# Patient Record
Sex: MALE | Race: OTHER | HISPANIC OR LATINO
[De-identification: names, ages, dates, MRNs, and addresses within clinical notes are randomized per-mention and may not be internally consistent; named-entity substitution may affect disease eponyms.]

---

## 2023-11-02 ENCOUNTER — TRANSCRIPTION ENCOUNTER (OUTPATIENT)
Age: 63
End: 2023-11-02

## 2023-11-02 ENCOUNTER — RESULT REVIEW (OUTPATIENT)
Age: 63
End: 2023-11-02

## 2023-11-13 PROBLEM — Z00.00 ENCOUNTER FOR PREVENTIVE HEALTH EXAMINATION: Status: ACTIVE | Noted: 2023-11-13

## 2023-11-30 ENCOUNTER — APPOINTMENT (OUTPATIENT)
Dept: HEPATOLOGY | Facility: CLINIC | Age: 63
End: 2023-11-30

## 2023-11-30 VITALS
RESPIRATION RATE: 16 BRPM | DIASTOLIC BLOOD PRESSURE: 85 MMHG | BODY MASS INDEX: 30.12 KG/M2 | HEART RATE: 88 BPM | WEIGHT: 170 LBS | SYSTOLIC BLOOD PRESSURE: 133 MMHG | OXYGEN SATURATION: 97 % | TEMPERATURE: 98.4 F | HEIGHT: 63 IN

## 2023-12-19 ENCOUNTER — APPOINTMENT (OUTPATIENT)
Dept: HEPATOLOGY | Facility: CLINIC | Age: 63
End: 2023-12-19
Payer: SELF-PAY

## 2023-12-19 VITALS
DIASTOLIC BLOOD PRESSURE: 84 MMHG | BODY MASS INDEX: 30.65 KG/M2 | SYSTOLIC BLOOD PRESSURE: 133 MMHG | HEIGHT: 63 IN | WEIGHT: 173 LBS | HEART RATE: 87 BPM

## 2023-12-19 PROCEDURE — 99214 OFFICE O/P EST MOD 30 MIN: CPT

## 2023-12-20 NOTE — HISTORY OF PRESENT ILLNESS
[de-identified] : 63 yr David M hx HTN and newly discovered Cirrhosis and portal htn with ascites during at hosp [facility] for abdl distention, underwent LVP with 8300 cc removed on 23. Followed at [facility] with hx of alcohol use, no previous hx ascites here with 2 weeks of abd distention and sob.  Immigrant, no insurance  had repeat LVP 8 days ago ([facility])  MELD 10 [ABO A+]  Accompanied by Son Dandy  MEDICATIONS lisinopril 5mg lasix 40 mg  SOCIAL [place], [place]rs in [place], not an [place] , 3 children TOBACCO - never ETOH - drank 14-16 beer on weekends for 7 yrs, reduced 3-4 beers weekends a year ago, stopeed drinking 3 mos ago DRUGS/HERBALS - denies  SURG no  FAMILY HX Father, liver issues, emdprses father daniella kaiser,  sister  pancreatic cancer at age39  STUDIES CT of the Abdomen and Pelvis  FINDINGS: LOWER CHEST: Cardiomegaly. Mild elevation right hemidiaphragm. Coronary artery calcifications. Bibasilar dependent atelectasis right greater than left. Distal esophageal varices and small hiatal hernia.  LIVER: Micronodular contour. Recanalized umbilical vein. Distal esophageal and left gastric varices. BILE DUCTS: Normal caliber. GALLBLADDER: Within normal limits. SPLEEN: Splenomegaly 14.2 cm AP diameter. Accessory spleen. PANCREAS: Within normal limits. ADRENALS: Within normal limits. KIDNEYS/URETERS: Within normal limits. BLADDER: Within normal limits. REPRODUCTIVE ORGANS: Normal size prostate. BOWEL: No bowel obstruction. Appendix is normal. Wall thickening from the cecum to the distal transverse colon. Colonic diverticulosis. Small hiatal hernia. PERITONEUM: Large amount of abdominal and pelvic ascites. Mesenteric and omental edema. VESSELS: Left gastric and distal esophageal varices. Recanalized umbilical vein. RETROPERITONEUM/LYMPH NODES: No lymphadenopathy. ABDOMINAL WALL: Small umbilical hernia containing fat and ascites. Small right and large left inguinal hernias containing fat and ascites. Subcutaneous edema. BONES: Straightening of normal lordosis. Moderate degenerative changes of the spine. [FreeTextEntry1] : VS stable  Mild muscle wasting  Large ascites  Clear lungs  Regular heart sounds  No edema   Awake and alert

## 2023-12-20 NOTE — ASSESSMENT
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  Patient was advised to go to emergency room if paracentesis cannot be arranged He is a scheduled for EGD and colonoscopy by his GI He is social and immigration status is a major barrier for transplant evaluation

## 2024-01-02 ENCOUNTER — APPOINTMENT (OUTPATIENT)
Dept: HEPATOLOGY | Facility: CLINIC | Age: 64
End: 2024-01-02
Payer: SELF-PAY

## 2024-01-02 VITALS
BODY MASS INDEX: 27.46 KG/M2 | HEIGHT: 63 IN | SYSTOLIC BLOOD PRESSURE: 133 MMHG | WEIGHT: 155 LBS | DIASTOLIC BLOOD PRESSURE: 88 MMHG | HEART RATE: 79 BPM

## 2024-01-02 PROCEDURE — 99213 OFFICE O/P EST LOW 20 MIN: CPT

## 2024-01-02 NOTE — PHYSICAL EXAM
[Alert] : alert [Normal Voice/Communication] : normal voice/communication [No Acute Distress] : no acute distress [Sclera] : the sclera and conjunctiva were normal [Normal Appearance] : the appearance of the neck was normal [No Neck Mass] : no neck mass was observed [No Respiratory Distress] : no respiratory distress [No Acc Muscle Use] : no accessory muscle use [Respiration, Rhythm And Depth] : normal respiratory rhythm and effort [Heart Rate And Rhythm] : heart rate was normal and rhythm regular [Normal S1, S2] : normal S1 and S2 [Abdomen Tenderness] : non-tender [No Masses] : no abdominal mass palpated [Abdomen Soft] : soft [No Joint Swelling] : no joint swelling seen [Normal Color / Pigmentation] : normal skin color and pigmentation [] : no rash [Oriented To Time, Place, And Person] : oriented to person, place, and time [de-identified] : Some temporal wasting [de-identified] : Mild abdominal distension  [de-identified] : No LE edema [de-identified] : NO asterixis

## 2024-01-02 NOTE — ASSESSMENT
[FreeTextEntry1] : Mr. Dickson is a 62y/o Singaporean gentleman with a medical history of sHTN, newly diagnosed liver cirrhosis (Nov, 2023) presumed due to alcohol use d/o who is here for follow up. His liver cirrhosis is complicated by ascites that has required LVP frequently in the past month - most recently on Friday 12/29.   Here with his daughter for follow up.  1. Ascites: Will continue lasix and spironolactone. Will check labs today and if electrolytes allow, will increase lasix and spironolactone. I emphasized the importance of low Na diet as the diuretics will not be as effective if his NA intake remains high. He verbalized understanding although appears to struggle with the concept of cutting down his salt intake. His daughter promises to help. WIll plan for LVP if needed prior to EGD next week. If recalcitrant to medical therapies, will consider TIPS +/- OLT eval. Now has emergency medicaid.  2. History of EV with no EVB per daughter. S/p banding and planned for repeat EGD 1/2/24. May benefit from NSBB. Will obtain EGD report.  3. No signs of HE.  4. Denies cravings. Advised that if it ever becomes an issue, he should please let us know as we have medications that can help. PETH today.  RTC in 4 weeks

## 2024-01-02 NOTE — HISTORY OF PRESENT ILLNESS
[FreeTextEntry1] : Mr. Dickson is a 64y/o [place] gentleman with a medical history of sHTN, newly diagnosed liver cirrhosis (Nov, 2023) presumed due to alcohol use d/o who is here for follow up. His liver cirrhosis is complicated by ascites that has required LVP frequently in the past month - most recently on Friday 12/29.   He is accompanied by his daughter at today's visit. He reports feeling well but is concerned about his frequent ascites re-accumulation.   He is compliant with his meds - Lasix 40mg, Spironolactone 50mg. But is not compliant with a low Na diet. I stressed the importance of ascites management with 2gm Na restricted diet. He offers no complaints. Had EGD few weeks ago and varices were found which were apparently banded. He is planned for repeat EGD on 1/8/24 for variceal follow up. WIll obtain report. Had an episode of dark stools about 3 days ago but it self resolved. Has a good appetite when his abdomen is not distended. Energy and sleep are ok.  Lives with his son. Not working at the moment.   CURRENT MEDICATIONS Lasix 40 mg Spironolactone 50mg  Omeprazole 40mg   STUDIES CT of the Abdomen and Pelvis  FINDINGS: LOWER CHEST: Cardiomegaly. Mild elevation right hemidiaphragm. Coronary artery calcifications. Bibasilar dependent atelectasis right greater than left. Distal esophageal varices and small hiatal hernia.  LIVER: Micronodular contour. Recanalized umbilical vein. Distal esophageal and left gastric varices. BILE DUCTS: Normal caliber. GALLBLADDER: Within normal limits. SPLEEN: Splenomegaly 14.2 cm AP diameter. Accessory spleen. PANCREAS: Within normal limits. ADRENALS: Within normal limits. KIDNEYS/URETERS: Within normal limits. BLADDER: Within normal limits. REPRODUCTIVE ORGANS: Normal size prostate. BOWEL: No bowel obstruction. Appendix is normal. Wall thickening from the cecum to the distal transverse colon. Colonic diverticulosis. Small hiatal hernia. PERITONEUM: Large amount of abdominal and pelvic ascites. Mesenteric and omental edema. VESSELS: Left gastric and distal esophageal varices. Recanalized umbilical vein. RETROPERITONEUM/LYMPH NODES: No lymphadenopathy. ABDOMINAL WALL: Small umbilical hernia containing fat and ascites. Small right and large left inguinal hernias containing fat and ascites. Subcutaneous edema. BONES: Straightening of normal lordosis. Moderate degenerative changes of the spine.

## 2024-01-10 ENCOUNTER — RESULT REVIEW (OUTPATIENT)
Age: 64
End: 2024-01-10

## 2024-01-24 ENCOUNTER — RESULT REVIEW (OUTPATIENT)
Age: 64
End: 2024-01-24

## 2024-01-31 LAB
ALBUMIN SERPL ELPH-MCNC: 3.8 G/DL
ALP BLD-CCNC: 240 U/L
ALT SERPL-CCNC: 38 U/L
ANION GAP SERPL CALC-SCNC: 12 MMOL/L
AST SERPL-CCNC: 65 U/L
BILIRUB SERPL-MCNC: 1.2 MG/DL
BUN SERPL-MCNC: 15 MG/DL
CALCIUM SERPL-MCNC: 8.7 MG/DL
CHLORIDE SERPL-SCNC: 97 MMOL/L
CO2 SERPL-SCNC: 23 MMOL/L
CREAT SERPL-MCNC: 0.95 MG/DL
EGFR: 91 ML/MIN/1.73M2
GLUCOSE SERPL-MCNC: 125 MG/DL
HCT VFR BLD CALC: 32.4 %
HGB BLD-MCNC: 10.4 G/DL
INR PPP: 1.09 RATIO
MCHC RBC-ENTMCNC: 26.2 PG
MCHC RBC-ENTMCNC: 32.1 GM/DL
MCV RBC AUTO: 81.6 FL
PETH 16:0/18:1: NEGATIVE NG/ML
PETH 16:0/18:2: NEGATIVE NG/ML
PETH COMMENTS: NORMAL
PLATELET # BLD AUTO: 242 K/UL
POTASSIUM SERPL-SCNC: 4 MMOL/L
PROT SERPL-MCNC: 6.9 G/DL
PT BLD: 12.3 SEC
RBC # BLD: 3.97 M/UL
RBC # FLD: 16.1 %
SODIUM SERPL-SCNC: 132 MMOL/L
WBC # FLD AUTO: 4.56 K/UL

## 2024-02-07 ENCOUNTER — RESULT REVIEW (OUTPATIENT)
Age: 64
End: 2024-02-07

## 2024-02-15 ENCOUNTER — APPOINTMENT (OUTPATIENT)
Dept: HEPATOLOGY | Facility: CLINIC | Age: 64
End: 2024-02-15
Payer: SELF-PAY

## 2024-02-15 VITALS — HEART RATE: 78 BPM | DIASTOLIC BLOOD PRESSURE: 76 MMHG | SYSTOLIC BLOOD PRESSURE: 116 MMHG

## 2024-02-15 PROCEDURE — 99214 OFFICE O/P EST MOD 30 MIN: CPT

## 2024-02-22 ENCOUNTER — TRANSCRIPTION ENCOUNTER (OUTPATIENT)
Age: 64
End: 2024-02-22

## 2024-02-25 ENCOUNTER — INPATIENT (INPATIENT)
Facility: HOSPITAL | Age: 64
LOS: 8 days | Discharge: HOME | DRG: 405 | End: 2024-03-05
Attending: STUDENT IN AN ORGANIZED HEALTH CARE EDUCATION/TRAINING PROGRAM | Admitting: STUDENT IN AN ORGANIZED HEALTH CARE EDUCATION/TRAINING PROGRAM
Payer: MEDICAID

## 2024-02-25 VITALS
WEIGHT: 138.01 LBS | OXYGEN SATURATION: 100 % | HEART RATE: 88 BPM | TEMPERATURE: 98 F | RESPIRATION RATE: 16 BRPM | SYSTOLIC BLOOD PRESSURE: 134 MMHG | DIASTOLIC BLOOD PRESSURE: 84 MMHG

## 2024-02-25 DIAGNOSIS — R18.8 OTHER ASCITES: ICD-10-CM

## 2024-02-25 DIAGNOSIS — Z29.9 ENCOUNTER FOR PROPHYLACTIC MEASURES, UNSPECIFIED: ICD-10-CM

## 2024-02-25 DIAGNOSIS — I10 ESSENTIAL (PRIMARY) HYPERTENSION: ICD-10-CM

## 2024-02-25 DIAGNOSIS — K74.60 UNSPECIFIED CIRRHOSIS OF LIVER: ICD-10-CM

## 2024-02-25 DIAGNOSIS — Z87.19 PERSONAL HISTORY OF OTHER DISEASES OF THE DIGESTIVE SYSTEM: Chronic | ICD-10-CM

## 2024-02-25 LAB
ALBUMIN SERPL ELPH-MCNC: 3.8 G/DL — SIGNIFICANT CHANGE UP (ref 3.3–5)
ALP SERPL-CCNC: 359 U/L — HIGH (ref 40–120)
ALT FLD-CCNC: 54 U/L — HIGH (ref 10–45)
ANION GAP SERPL CALC-SCNC: 9 MMOL/L — SIGNIFICANT CHANGE UP (ref 5–17)
APTT BLD: 31 SEC — SIGNIFICANT CHANGE UP (ref 24.5–35.6)
AST SERPL-CCNC: 75 U/L — HIGH (ref 10–40)
BASOPHILS # BLD AUTO: 0.11 K/UL — SIGNIFICANT CHANGE UP (ref 0–0.2)
BASOPHILS NFR BLD AUTO: 1.5 % — SIGNIFICANT CHANGE UP (ref 0–2)
BILIRUB DIRECT SERPL-MCNC: 0.2 MG/DL — SIGNIFICANT CHANGE UP (ref 0–0.3)
BILIRUB INDIRECT FLD-MCNC: 0.6 MG/DL — SIGNIFICANT CHANGE UP (ref 0.2–1)
BILIRUB SERPL-MCNC: 0.8 MG/DL — SIGNIFICANT CHANGE UP (ref 0.2–1.2)
BILIRUB SERPL-MCNC: 0.8 MG/DL — SIGNIFICANT CHANGE UP (ref 0.2–1.2)
BILIRUB SERPL-MCNC: 0.9 MG/DL — SIGNIFICANT CHANGE UP (ref 0.2–1.2)
BLD GP AB SCN SERPL QL: NEGATIVE — SIGNIFICANT CHANGE UP
BUN SERPL-MCNC: 16 MG/DL — SIGNIFICANT CHANGE UP (ref 7–23)
CALCIUM SERPL-MCNC: 9 MG/DL — SIGNIFICANT CHANGE UP (ref 8.4–10.5)
CHLORIDE SERPL-SCNC: 99 MMOL/L — SIGNIFICANT CHANGE UP (ref 96–108)
CO2 SERPL-SCNC: 22 MMOL/L — SIGNIFICANT CHANGE UP (ref 22–31)
CREAT SERPL-MCNC: 0.79 MG/DL — SIGNIFICANT CHANGE UP (ref 0.5–1.3)
CREAT SERPL-MCNC: 0.83 MG/DL — SIGNIFICANT CHANGE UP (ref 0.5–1.3)
EGFR: 100 ML/MIN/1.73M2 — SIGNIFICANT CHANGE UP
EGFR: 101 ML/MIN/1.73M2 — SIGNIFICANT CHANGE UP
EOSINOPHIL # BLD AUTO: 0.09 K/UL — SIGNIFICANT CHANGE UP (ref 0–0.5)
EOSINOPHIL NFR BLD AUTO: 1.2 % — SIGNIFICANT CHANGE UP (ref 0–6)
GLUCOSE SERPL-MCNC: 114 MG/DL — HIGH (ref 70–99)
HBV CORE AB SER-ACNC: SIGNIFICANT CHANGE UP
HBV SURFACE AB SER-ACNC: SIGNIFICANT CHANGE UP
HCT VFR BLD CALC: 37.9 % — LOW (ref 39–50)
HGB BLD-MCNC: 12.3 G/DL — LOW (ref 13–17)
IMM GRANULOCYTES NFR BLD AUTO: 0.4 % — SIGNIFICANT CHANGE UP (ref 0–0.9)
INR BLD: 1.14 — SIGNIFICANT CHANGE UP (ref 0.85–1.18)
INR BLD: 1.14 — SIGNIFICANT CHANGE UP (ref 0.85–1.18)
LIDOCAIN IGE QN: 62 U/L — HIGH (ref 7–60)
LYMPHOCYTES # BLD AUTO: 1.46 K/UL — SIGNIFICANT CHANGE UP (ref 1–3.3)
LYMPHOCYTES # BLD AUTO: 19.3 % — SIGNIFICANT CHANGE UP (ref 13–44)
MCHC RBC-ENTMCNC: 25.8 PG — LOW (ref 27–34)
MCHC RBC-ENTMCNC: 32.5 GM/DL — SIGNIFICANT CHANGE UP (ref 32–36)
MCV RBC AUTO: 79.6 FL — LOW (ref 80–100)
MELD SCORE WITH DIALYSIS: 27 POINTS — SIGNIFICANT CHANGE UP
MELD SCORE WITHOUT DIALYSIS: 8 POINTS — SIGNIFICANT CHANGE UP
MONOCYTES # BLD AUTO: 0.67 K/UL — SIGNIFICANT CHANGE UP (ref 0–0.9)
MONOCYTES NFR BLD AUTO: 8.8 % — SIGNIFICANT CHANGE UP (ref 2–14)
NEUTROPHILS # BLD AUTO: 5.22 K/UL — SIGNIFICANT CHANGE UP (ref 1.8–7.4)
NEUTROPHILS NFR BLD AUTO: 68.8 % — SIGNIFICANT CHANGE UP (ref 43–77)
NRBC # BLD: 0 /100 WBCS — SIGNIFICANT CHANGE UP (ref 0–0)
PLATELET # BLD AUTO: 269 K/UL — SIGNIFICANT CHANGE UP (ref 150–400)
POTASSIUM SERPL-MCNC: 4.9 MMOL/L — SIGNIFICANT CHANGE UP (ref 3.5–5.3)
POTASSIUM SERPL-SCNC: 4.9 MMOL/L — SIGNIFICANT CHANGE UP (ref 3.5–5.3)
PROT SERPL-MCNC: 7.2 G/DL — SIGNIFICANT CHANGE UP (ref 6–8.3)
PROTHROM AB SERPL-ACNC: 12.9 SEC — SIGNIFICANT CHANGE UP (ref 9.5–13)
PROTHROM AB SERPL-ACNC: 13 SEC — SIGNIFICANT CHANGE UP (ref 9.5–13)
RBC # BLD: 4.76 M/UL — SIGNIFICANT CHANGE UP (ref 4.2–5.8)
RBC # FLD: 19.6 % — HIGH (ref 10.3–14.5)
RH IG SCN BLD-IMP: POSITIVE — SIGNIFICANT CHANGE UP
SODIUM SERPL-SCNC: 128 MMOL/L — LOW (ref 135–145)
SODIUM SERPL-SCNC: 130 MMOL/L — LOW (ref 135–145)
WBC # BLD: 7.58 K/UL — SIGNIFICANT CHANGE UP (ref 3.8–10.5)
WBC # FLD AUTO: 7.58 K/UL — SIGNIFICANT CHANGE UP (ref 3.8–10.5)

## 2024-02-25 PROCEDURE — 99285 EMERGENCY DEPT VISIT HI MDM: CPT

## 2024-02-25 PROCEDURE — 93010 ELECTROCARDIOGRAM REPORT: CPT

## 2024-02-25 PROCEDURE — 99223 1ST HOSP IP/OBS HIGH 75: CPT | Mod: GC

## 2024-02-25 PROCEDURE — 71045 X-RAY EXAM CHEST 1 VIEW: CPT | Mod: 26

## 2024-02-25 RX ORDER — OMEPRAZOLE 10 MG/1
1 CAPSULE, DELAYED RELEASE ORAL
Refills: 0 | DISCHARGE

## 2024-02-25 RX ORDER — FUROSEMIDE 40 MG
20 TABLET ORAL
Refills: 0 | Status: DISCONTINUED | OUTPATIENT
Start: 2024-02-26 | End: 2024-02-27

## 2024-02-25 RX ORDER — FUROSEMIDE 40 MG
20 TABLET ORAL ONCE
Refills: 0 | Status: COMPLETED | OUTPATIENT
Start: 2024-02-25 | End: 2024-02-25

## 2024-02-25 RX ORDER — PANTOPRAZOLE SODIUM 20 MG/1
40 TABLET, DELAYED RELEASE ORAL
Refills: 0 | Status: DISCONTINUED | OUTPATIENT
Start: 2024-02-25 | End: 2024-03-05

## 2024-02-25 RX ORDER — SPIRONOLACTONE 25 MG/1
50 TABLET, FILM COATED ORAL AT BEDTIME
Refills: 0 | Status: DISCONTINUED | OUTPATIENT
Start: 2024-02-26 | End: 2024-02-27

## 2024-02-25 RX ORDER — ENOXAPARIN SODIUM 100 MG/ML
40 INJECTION SUBCUTANEOUS EVERY 24 HOURS
Refills: 0 | Status: DISCONTINUED | OUTPATIENT
Start: 2024-02-25 | End: 2024-02-26

## 2024-02-25 RX ORDER — INFLUENZA VIRUS VACCINE 15; 15; 15; 15 UG/.5ML; UG/.5ML; UG/.5ML; UG/.5ML
0.5 SUSPENSION INTRAMUSCULAR ONCE
Refills: 0 | Status: DISCONTINUED | OUTPATIENT
Start: 2024-02-25 | End: 2024-03-05

## 2024-02-25 RX ORDER — SPIRONOLACTONE 25 MG/1
50 TABLET, FILM COATED ORAL DAILY
Refills: 0 | Status: DISCONTINUED | OUTPATIENT
Start: 2024-02-25 | End: 2024-02-25

## 2024-02-25 RX ORDER — SPIRONOLACTONE 25 MG/1
50 TABLET, FILM COATED ORAL ONCE
Refills: 0 | Status: COMPLETED | OUTPATIENT
Start: 2024-02-25 | End: 2024-02-25

## 2024-02-25 RX ORDER — FUROSEMIDE 40 MG
40 TABLET ORAL DAILY
Refills: 0 | Status: DISCONTINUED | OUTPATIENT
Start: 2024-02-25 | End: 2024-02-25

## 2024-02-25 RX ADMIN — SPIRONOLACTONE 50 MILLIGRAM(S): 25 TABLET, FILM COATED ORAL at 21:22

## 2024-02-25 RX ADMIN — Medication 20 MILLIGRAM(S): at 21:22

## 2024-02-25 NOTE — H&P ADULT - NSHPLABSRESULTS_GEN_ALL_CORE
.  LABS:                         12.3   7.58  )-----------( 269      ( 25 Feb 2024 16:54 )             37.9     02-25    130<L>  |  99  |  16  ----------------------------<  114<H>  4.9   |  22  |  0.83    Ca    9.0      25 Feb 2024 16:54    TPro  7.2  /  Alb  3.8  /  TBili  0.8  /  DBili  0.2  /  AST  75<H>  /  ALT  54<H>  /  AlkPhos  359<H>  02-25    PT/INR - ( 25 Feb 2024 16:54 )   PT: 13.0 sec;   INR: 1.14          PTT - ( 25 Feb 2024 16:54 )  PTT:31.0 sec  Urinalysis Basic - ( 25 Feb 2024 16:54 )    Color: x / Appearance: x / SG: x / pH: x  Gluc: 114 mg/dL / Ketone: x  / Bili: x / Urobili: x   Blood: x / Protein: x / Nitrite: x   Leuk Esterase: x / RBC: x / WBC x   Sq Epi: x / Non Sq Epi: x / Bacteria: x                RADIOLOGY, EKG & ADDITIONAL TESTS: Reviewed.

## 2024-02-25 NOTE — H&P ADULT - ASSESSMENT
63M w PMHx HTN, remote history of alcohol use disorder, and cirrhosis, esophageal varices, and recurrent ascites requiring frequent paracenteses, presenting with abdominal distension, admitted for paracentesis & inpatient TIPS procedure.

## 2024-02-25 NOTE — H&P ADULT - ATTENDING COMMENTS
#decompensated cirrhosis: hx of alcohol cirrhosis, p/w ascites. Low c/f SBP. 0/4 SIRS. Per GI, plan for therapeutic paracentesis and IR consult for possible TIPS. F/up GI work up. c/w home lasix/spironolactone, serial abd exams.  ID # [id number]  #decompensated cirrhosis: hx of alcohol cirrhosis, p/w ascites. Low c/f SBP. 0/4 SIRS. Per GI, plan for therapeutic paracentesis and IR consult for possible TIPS. F/up GI work up. c/w home lasix/spironolactone, serial abd exams.

## 2024-02-25 NOTE — H&P ADULT - PROBLEM SELECTOR PLAN 3
F: None  E: Replete PRN to K>4, Mg>2  N: DASH diet  DVT PPx: Lovenox 40mg sq  GI PPx: omeprazole 20mg qd (home rx)  Dispo: [facility]  Code: FULL CODE ascites tx regimen including home medication(s) spirinolactone & lasix     - continue home medication(s)

## 2024-02-25 NOTE — ED PROVIDER NOTE - CPE EDP NEURO NORM
Detail Level: Detailed Quality 130: Documentation Of Current Medications In The Medical Record: Current Medications Documented Quality 431: Preventive Care And Screening: Unhealthy Alcohol Use - Screening: Patient screened for unhealthy alcohol use using a single question and scores less than 2 times per year Quality 402: Tobacco Use And Help With Quitting Among Adolescents: Patient screened for tobacco and is an ex-smoker normal...

## 2024-02-25 NOTE — CHART NOTE - NSCHARTNOTEFT_GEN_A_CORE
Patient discussed and chart reviewed.    63M h/o cirrhosis (c/b recurrent ascites), EtOH use disorder (in remission) p/w ascites.     Had 11.7L paracentesis on 2/8 followed by 10.7L paracentesis on 2/21. Last seen by in hepatology clinic on 2/15 with plan for TIPS (clarified w/ Dr. Vizcarra) if he develops recurrent ascites. Pt is unable to undergo TIPS outpatient given his social situation.    Labs from 11/2023 with AFP 2.2, KARYN 1:320, SMA 1:20, neg HAV IgM, neg HBsAg, neg IgM HBcAb, and neg HCV Ab.    Recommendations:  - Send PETH, HAV IgG, HBV surface Ab, HBV core total Ab and daily MELD labs  - Diagnostic/therapeutic paracentesis and send cell count, cultures, albumin, protein. Please give 8g of 25% albumin per liter removed if >5L.  - Obtain TTE   - Obtain MRI liver w/wo contrast vs CT A/P w/wo contrast triple phase  - IR consult tomorrow AM    GI will formally see tomorrow.    Christian (Yanique Stewart MD  Gastroenterology Fellow  Pager (M-F 7a-5p): [phone]  Pager (after hours): Please call  for on-call fellow

## 2024-02-25 NOTE — ED ADULT TRIAGE NOTE - CHIEF COMPLAINT QUOTE
Abd bloating, patient states paracentesis 4 days ago in [facility], c/o of worsening bloating to abd.  Hx of liver disease.

## 2024-02-25 NOTE — ED PROVIDER NOTE - NS ED MD TWO NIGHTS YN
Yes
Additional Notes: [facility] f/u with BSA, PASI, IGA collected today.
Detail Level: Simple
Render Risk Assessment In Note?: no

## 2024-02-25 NOTE — ED ADULT NURSE NOTE - CHIEF COMPLAINT QUOTE
Abd bloating, patient states paracentesis 4 days ago in [facility], c/o of worsening bloating to abd.  Hx of liver disease.

## 2024-02-25 NOTE — ED PROVIDER NOTE - OBJECTIVE STATEMENT
[place] S Video  Liam [id number], daughter at bedside took over in middle  63M PMH pior ETOH abuse, HTN, cirrhosis/esophageal varices/recurrent ascites (presumed 2/2 etoh), p/w abd distention. W/i last few weeks pt has 2 LVP by IR, last had ~11L drained on 2/21 at [facility]. Has worsening abd distention and SOB (symptoms similar to prior to paracentesis). Pt states he was evaluated by Dr. Vizcarra and told to come to ER today for admission for further evaluation of possible additional procedures.   Has labs/CT in [facility].  Denies f/c, CP, NVD, urinary complaints, URI symptoms, focal weakness/numbness.

## 2024-02-25 NOTE — H&P ADULT - PROBLEM SELECTOR PLAN 1
home medication(s): sprinolactone 50mg qd, lasix 40mg qd  pt sent to ed for inpatient tips procedure by his gastroenterologist dr dos santos  recurrent ascites requiring paracentesis (of approximately 22.5L within the last month)  november 2023 labwork notable for AFP 2.2, KARYN 1:320, SMA 1:20,  HAV IgM, HBsAg, HBcAb IgM, HCV Ab - all negative  gi following, recommendations appreciated  - per GI, follow up labwork: PETH,                                                      HAV IgG,                                                      HBV surface Ab,                                                      HBV core total Ab,                                                      daily MELD labs                                    imaging: MRI liver w/wo contrast vs CT A/P w/wo contrast triple phase                                                      TTE  - plan for diagnostic/therapeutic paracentesis, will send cell count, cultures, albumin, protein,                    rec administration of 8g of 25% albumin per additional liter removed after initial 5L  - IR consult in am  - formal GI consult in am  - continue home medication(s) home medication(s): sprinolactone 50mg qd, lasix 40mg qd  pt sent to ed for inpatient tips procedure by his gastroenterologist dr dos santos  cirrhosis presumed secondary to past alcohol use, with esophageal varices & recurrent ascites requiring paracentesis (of approximately 22.5L within the last month)  november 2023 labwork notable for AFP 2.2, KARYN 1:320, SMA 1:20,  HAV IgM, HBsAg, HBcAb IgM, HCV Ab - all negative  gi following, recommendations appreciated  - per GI, follow up labwork:             PETH,                                                      HAV IgG,                                                      HBV surface Ab,                                                      HBV core total Ab,                                                      daily MELD labs                                    imaging:             MRI liver w/wo contrast vs CT A/P w/wo contrast triple phase                                                      TTE  - plan for diagnostic/therapeutic paracentesis, will send cell count, cultures, albumin, protein,                    rec administration of 8g of 25% albumin per additional liter removed after initial 5L  - IR consult in am  - formal GI consult in am  - continue home medication(s)

## 2024-02-25 NOTE — H&P ADULT - PROBLEM SELECTOR PLAN 2
ascites tx regimen including home medication(s) spirinolactone & lasix     - continue home medication(s) home medication(s): spirinolactone 50mg qhs, lasix 20mg bid  low concern for sbp (pt is afebrile, without leukocytosis or other toxic s/s, denies abdominal tenderness, no abd ttp on exam)    - continue home medication(s)  - continue to monitor for s/s infection, frequent abd exams  - planned for paracentesis, will otherwise continue w workup as above (see #1)

## 2024-02-25 NOTE — HISTORY OF PRESENT ILLNESS
[de-identified] : 63 yr David M hx HTN and newly discovered Cirrhosis and portal htn with ascites during at [facility] for abdl distention, underwent LVP with 8300 cc removed on 23. Followed at [facility] with hx of alcohol use, no previous hx ascites here with 2 weeks of abd distention and sob.  Immigrant, no insurance had repeat LVP 8 days ago ([facility]) MELD 10 [ABO A+]  Accompanied by Son Dandy MEDICATIONS lisinopril 5mg lasix 40 mg  SOCIAL [place], [place], [place] , 3 children TOBACCO - never ETOH - drank 14-16 beer on weekends for 7 yrs, reduced 3-4 beers weekends a year ago, stopeed drinking 3 mos ago DRUGS/HERBALS - denies  SURG no  FAMILY HX Father, liver issues, emdprses father daniella kaiser,  sister  pancreatic cancer at age39  STUDIES CT of the Abdomen and Pelvis  FINDINGS: LOWER CHEST: Cardiomegaly. Mild elevation right hemidiaphragm. Coronary artery calcifications. Bibasilar dependent atelectasis right greater than left. Distal esophageal varices and small hiatal hernia.  LIVER: Micronodular contour. Recanalized umbilical vein. Distal esophageal and left gastric varices. BILE DUCTS: Normal caliber. GALLBLADDER: Within normal limits. SPLEEN: Splenomegaly 14.2 cm AP diameter. Accessory spleen. PANCREAS: Within normal limits. ADRENALS: Within normal limits. KIDNEYS/URETERS: Within normal limits. BLADDER: Within normal limits. REPRODUCTIVE ORGANS: Normal size prostate. BOWEL: No bowel obstruction. Appendix is normal. Wall thickening from the cecum to the distal transverse colon. Colonic diverticulosis. Small hiatal hernia. PERITONEUM: Large amount of abdominal and pelvic ascites. Mesenteric and omental edema. VESSELS: Left gastric and distal esophageal varices. Recanalized umbilical vein. RETROPERITONEUM/LYMPH NODES: No lymphadenopathy. ABDOMINAL WALL: Small umbilical hernia containing fat and ascites. Small right and large left inguinal hernias containing fat and ascites. Subcutaneous edema. BONES: Straightening of normal lordosis. Moderate degenerative changes of the spine.   VS stable Mild muscle wasting Large ascites Clear lungs Regular heart sounds No edema  Awake and alert [FreeTextEntry1] : Patient is here for follow-up He continues to require weekly paracentesis with significant amount of fluid removed He is losing muscle weight Nausea but no vomiting No rectal bleeding or shortness of breath No chest pain Physical exam shows stable vital signs but worsening and muscle weakness especially in the chest and upper extremities  No hepatic encephalopathy No edema

## 2024-02-25 NOTE — H&P ADULT - HISTORY OF PRESENT ILLNESS
63M w PMHx HTN, remote history of alcohol use disorder, and cirrhosis, esophageal varices, and recurrent ascites requiring frequent paracenteses, presenting with abdominal distension. He was admitted to [facility] with similar complaint, where he had 10.7L abdominal fluid drained 2/21 (then complicated by hypotension, improved after administration of 4 bags of albumin). He previously had 11.7L removed on 2/8. Pt states that he recently saw Dr. Vizcarra at his outpatient hepatology clinic, who advised him to present to ED if he develops recurrent ascites, as pt will likely require TIPS procedure but is unable to do undergo said procedure in outpatient setting due to social circumstances.    ED Course:  Labs notable for Hgb 12.3, Na 130, AlkPhos 359, AST 75, ALT 54, Lipase 62  EKG NSR  Hepatology consulted

## 2024-02-25 NOTE — ED PROVIDER NOTE - PHYSICAL EXAMINATION
no LE edema, normal equal distal pulses, steady unassisted gait.   abd: soft, mild distention, no rebound/guarding.

## 2024-02-25 NOTE — H&P ADULT - PROBLEM SELECTOR PLAN 4
F: None  E: Replete PRN to K>4, Mg>2  N: DASH diet  DVT PPx: Lovenox 40mg sq  GI PPx: omeprazole 20mg qd (home rx)  Dispo: [facility]  Code: FULL CODE

## 2024-02-25 NOTE — ASSESSMENT
[FreeTextEntry1] : Patient with cirrhosis related to alcohol use disorder He has been requiring tap and once a week He is social status precludes him from liver transplant candidacy Low-salt high-protein diet discussed If the ascites gets worse, patient should be admitted to hospital for TIPS evaluation.  In that case he would require cross-sectional imaging of the abdomen plus echocardiogram

## 2024-02-25 NOTE — H&P ADULT - NSICDXFAMILYHX_GEN_ALL_CORE_FT
FAMILY HISTORY:  Father  Still living? Unknown  FH: liver disease, Age at diagnosis: Age Unknown    Sibling  Still living? Unknown  FH: pancreatic disease, Age at diagnosis: Age Unknown

## 2024-02-25 NOTE — ED ADULT NURSE NOTE - OBJECTIVE STATEMENT
63y male, Salvadorean speaking, translation by daughter as per pt request. pt reports paracentesis 4 days ago in [facility], c/o increased abd bloating today. abdomen distended. hx of liver disease. 63y male, Liberian speaking, translation by daughter as per pt request. pt reports paracentesis 4 days ago in [facility], c/o increased abd bloating today. abdomen distended. hx of liver disease, ETOH abuse in remission. 63y male, [place] speaking, translation by daughter as per pt request. pt reports paracentesis 4 days ago in [facility]", c/o increased abd bloating today. abdomen distended. last had ~11L drained on 2/21 at [facility]. endorsing some SOB. hx of liver disease, ETOH abuse in remission. denies CP, HA, dizziness, n/v/d, numbness/tingling, blurry vision, f/c.

## 2024-02-25 NOTE — ADDENDUM
[FreeTextEntry1] : Clarified that "flattening of PEG" in assessment is a typo and should be TIPS evaluation.

## 2024-02-25 NOTE — ED ADULT NURSE NOTE - NSFALLUNIVINTERV_ED_ALL_ED
Bed/Stretcher in lowest position, wheels locked, appropriate side rails in place/Call bell, personal items and telephone in reach/Instruct patient to call for assistance before getting out of bed/chair/stretcher/Non-slip footwear applied when patient is off stretcher/[place] to call system/Physically safe environment - no spills, clutter or unnecessary equipment/Purposeful proactive rounding/Room/bathroom lighting operational, light cord in reach

## 2024-02-25 NOTE — H&P ADULT - NSHPPHYSICALEXAM_GEN_ALL_CORE
General: NAD  HEENT: PERRL, EOM, anicteric sclera, MMM  Neck: supple, no JVD  Respiratory: CTA B/L; no W/R/R  Cardiovascular: +S1/S2; RRR; no M/R/G  Gastrointestinal: soft, nontender, distended; +BS x4  Extremities: WWP; 2+ peripheral pulses B/L; no LE edema  Skin: normal color and turgor; no rash  Neurologic: AOx3 General: NAD  HEENT: PERRL, EOM, mildly icteric sclera, MMM  Neck: supple, no JVD  Respiratory: CTA B/L; no W/R/R  Cardiovascular: +S1/S2; RRR; no M/R/G  Gastrointestinal: soft, nontender, distended; +BS x4  Extremities: WWP; 2+ peripheral pulses B/L; no LE edema  Skin: normal color and turgor; no rash; skin of extremities very dry, most notably of the hands, palms  Neurologic: AOx4

## 2024-02-26 ENCOUNTER — RESULT REVIEW (OUTPATIENT)
Age: 64
End: 2024-02-26

## 2024-02-26 LAB
ALBUMIN SERPL ELPH-MCNC: 3.8 G/DL — SIGNIFICANT CHANGE UP (ref 3.3–5)
ALP SERPL-CCNC: 318 U/L — HIGH (ref 40–120)
ALT FLD-CCNC: 46 U/L — HIGH (ref 10–45)
ANION GAP SERPL CALC-SCNC: 11 MMOL/L — SIGNIFICANT CHANGE UP (ref 5–17)
AST SERPL-CCNC: 62 U/L — HIGH (ref 10–40)
BASOPHILS # BLD AUTO: 0.09 K/UL — SIGNIFICANT CHANGE UP (ref 0–0.2)
BASOPHILS NFR BLD AUTO: 1.6 % — SIGNIFICANT CHANGE UP (ref 0–2)
BILIRUB SERPL-MCNC: 1.7 MG/DL — HIGH (ref 0.2–1.2)
BUN SERPL-MCNC: 14 MG/DL — SIGNIFICANT CHANGE UP (ref 7–23)
CALCIUM SERPL-MCNC: 8.9 MG/DL — SIGNIFICANT CHANGE UP (ref 8.4–10.5)
CHLORIDE SERPL-SCNC: 96 MMOL/L — SIGNIFICANT CHANGE UP (ref 96–108)
CO2 SERPL-SCNC: 23 MMOL/L — SIGNIFICANT CHANGE UP (ref 22–31)
CREAT SERPL-MCNC: 0.9 MG/DL — SIGNIFICANT CHANGE UP (ref 0.5–1.3)
EGFR: 97 ML/MIN/1.73M2 — SIGNIFICANT CHANGE UP
EOSINOPHIL # BLD AUTO: 0.13 K/UL — SIGNIFICANT CHANGE UP (ref 0–0.5)
EOSINOPHIL NFR BLD AUTO: 2.3 % — SIGNIFICANT CHANGE UP (ref 0–6)
GLUCOSE SERPL-MCNC: 84 MG/DL — SIGNIFICANT CHANGE UP (ref 70–99)
HAV IGG SER QL IA: REACTIVE
HCT VFR BLD CALC: 37.4 % — LOW (ref 39–50)
HCV AB S/CO SERPL IA: 0.04 S/CO — SIGNIFICANT CHANGE UP
HCV AB SERPL-IMP: SIGNIFICANT CHANGE UP
HGB BLD-MCNC: 12.1 G/DL — LOW (ref 13–17)
IMM GRANULOCYTES NFR BLD AUTO: 0.4 % — SIGNIFICANT CHANGE UP (ref 0–0.9)
INR BLD: 1.22 — HIGH (ref 0.85–1.18)
LYMPHOCYTES # BLD AUTO: 1.59 K/UL — SIGNIFICANT CHANGE UP (ref 1–3.3)
LYMPHOCYTES # BLD AUTO: 28.7 % — SIGNIFICANT CHANGE UP (ref 13–44)
MAGNESIUM SERPL-MCNC: 2.3 MG/DL — SIGNIFICANT CHANGE UP (ref 1.6–2.6)
MCHC RBC-ENTMCNC: 25.2 PG — LOW (ref 27–34)
MCHC RBC-ENTMCNC: 32.4 GM/DL — SIGNIFICANT CHANGE UP (ref 32–36)
MCV RBC AUTO: 77.9 FL — LOW (ref 80–100)
MELD SCORE WITH DIALYSIS: 28 POINTS — SIGNIFICANT CHANGE UP
MELD SCORE WITHOUT DIALYSIS: 11 POINTS — SIGNIFICANT CHANGE UP
MONOCYTES # BLD AUTO: 0.47 K/UL — SIGNIFICANT CHANGE UP (ref 0–0.9)
MONOCYTES NFR BLD AUTO: 8.5 % — SIGNIFICANT CHANGE UP (ref 2–14)
NEUTROPHILS # BLD AUTO: 3.24 K/UL — SIGNIFICANT CHANGE UP (ref 1.8–7.4)
NEUTROPHILS NFR BLD AUTO: 58.5 % — SIGNIFICANT CHANGE UP (ref 43–77)
NRBC # BLD: 0 /100 WBCS — SIGNIFICANT CHANGE UP (ref 0–0)
PHOSPHATE SERPL-MCNC: 4.1 MG/DL — SIGNIFICANT CHANGE UP (ref 2.5–4.5)
PLATELET # BLD AUTO: 229 K/UL — SIGNIFICANT CHANGE UP (ref 150–400)
POTASSIUM SERPL-MCNC: 4.5 MMOL/L — SIGNIFICANT CHANGE UP (ref 3.5–5.3)
POTASSIUM SERPL-SCNC: 4.5 MMOL/L — SIGNIFICANT CHANGE UP (ref 3.5–5.3)
PROT SERPL-MCNC: 7.3 G/DL — SIGNIFICANT CHANGE UP (ref 6–8.3)
PROTHROM AB SERPL-ACNC: 13.8 SEC — HIGH (ref 9.5–13)
RBC # BLD: 4.8 M/UL — SIGNIFICANT CHANGE UP (ref 4.2–5.8)
RBC # FLD: 19.5 % — HIGH (ref 10.3–14.5)
SODIUM SERPL-SCNC: 130 MMOL/L — LOW (ref 135–145)
WBC # BLD: 5.54 K/UL — SIGNIFICANT CHANGE UP (ref 3.8–10.5)
WBC # FLD AUTO: 5.54 K/UL — SIGNIFICANT CHANGE UP (ref 3.8–10.5)

## 2024-02-26 PROCEDURE — 99233 SBSQ HOSP IP/OBS HIGH 50: CPT | Mod: GC

## 2024-02-26 PROCEDURE — 99232 SBSQ HOSP IP/OBS MODERATE 35: CPT

## 2024-02-26 PROCEDURE — 93306 TTE W/DOPPLER COMPLETE: CPT | Mod: 26

## 2024-02-26 PROCEDURE — 74177 CT ABD & PELVIS W/CONTRAST: CPT | Mod: 26

## 2024-02-26 RX ORDER — LACTULOSE 10 G/15ML
15 SOLUTION ORAL
Refills: 0 | Status: DISCONTINUED | OUTPATIENT
Start: 2024-02-26 | End: 2024-02-29

## 2024-02-26 RX ADMIN — SPIRONOLACTONE 50 MILLIGRAM(S): 25 TABLET, FILM COATED ORAL at 21:47

## 2024-02-26 RX ADMIN — Medication 20 MILLIGRAM(S): at 13:11

## 2024-02-26 RX ADMIN — PANTOPRAZOLE SODIUM 40 MILLIGRAM(S): 20 TABLET, DELAYED RELEASE ORAL at 06:07

## 2024-02-26 RX ADMIN — Medication 20 MILLIGRAM(S): at 06:07

## 2024-02-26 NOTE — PROGRESS NOTE ADULT - PROBLEM SELECTOR PLAN 3
ascites tx regimen including home medication(s) spirinolactone & lasix     - continue home medication(s)

## 2024-02-26 NOTE — PROGRESS NOTE ADULT - PROBLEM SELECTOR PLAN 1
home medication(s): sprinolactone 50mg qd, lasix 40mg qd  pt sent to ed for inpatient tips procedure by his gastroenterologist dr dos santos  cirrhosis presumed secondary to past alcohol use, with esophageal varices & recurrent ascites requiring paracentesis (of approximately 22.5L within the last month)  november 2023 labwork notable for AFP 2.2, KARYN 1:320, SMA 1:20,  HAV IgM, HBsAg, HBcAb IgM, HCV Ab - all negative  gi following, recommendations appreciated  - per GI, follow up labwork:             PETH,                                                      HAV IgG,                                                      HBV surface Ab,                                                      HBV core total Ab,                                                      daily MELD labs                                    imaging:             MRI liver w/wo contrast vs CT A/P w/wo contrast triple phase                                                      TTE  - plan for diagnostic/therapeutic paracentesis, will send cell count, cultures, albumin, protein,                    rec administration of 8g of 25% albumin per additional liter removed after initial 5L  - IR consult in am  - formal GI consult in am  - continue home medication(s) home medication(s): sprinolactone 50mg qd, lasix 40mg qd  pt sent to ed for inpatient tips procedure by his gastroenterologist dr Vizcarra  cirrhosis presumed secondary to past alcohol use, with esophageal varices & recurrent ascites requiring paracentesis (of approximately 22.5L within the last month). 11.7 L on 2/8 and 10.7 L on 2/21.   November 2023 labwork notable for AFP 2.2, KARYN 1:320, SMA 1:20,  HAV IgM, HBsAg, HBcAb IgM, HCV Ab - all negative  gi following, recommendations appreciated  - per GI, follow up labwork:                                                                   HBV surface Ab nonreactive                                                       HBV core total Ab nonreactive                                                       daily MELD labs                                                      PETH,                                                      HAV IgG,                                    imaging:      MRI liver w/wo contrast vs CT A/P w/wo contrast triple phase                                                      TTE  - plan for IR diagnostic/therapeutic paracentesis on 2/27, will send cell count, cultures, albumin, protein,                    rec administration of 8g of 25% albumin per additional liter removed after initial 5L  - plan for NPO for TIPS on 2/27   - continue home medication(s)

## 2024-02-26 NOTE — CONSULT NOTE ADULT - SUBJECTIVE AND OBJECTIVE BOX
Initial Hepatology Consult Progress Note:     HPI:  63M w PMHx HTN, remote history of alcohol use disorder, and cirrhosis, esophageal varices, and recurrent ascites requiring frequent paracenteses, presenting with abdominal distension. He was admitted to [facility] with similar complaint, where he had 10.7L abdominal fluid drained 2/21 (then complicated by hypotension, improved after administration of 4 bags of albumin). He previously had 11.7L removed on 2/8. Pt states that he recently saw Dr. Vizcarra at his outpatient hepatology clinic, who advised him to present to ED if he develops recurrent ascites, as pt will likely require TIPS procedure but is unable to do undergo said procedure in outpatient setting due to social circumstances.    ED Course:  Labs notable for Hgb 12.3, Na 130, AlkPhos 359, AST 75, ALT 54, Lipase 62  EKG NSR  Hepatology consulted (25 Feb 2024 18:24)    Allergies    No Known Allergies    Intolerances      Home Medications:  Lasix 40 mg oral tablet: 1 tab(s) orally once a day (25 Feb 2024 18:37)  omeprazole 20 mg oral delayed release tablet: 1 tab(s) orally once a day before breakfast (25 Feb 2024 18:37)  spironolactone 50 mg oral tablet: 1 tab(s) orally once a day (25 Feb 2024 18:38)    MEDICATIONS:  MEDICATIONS  (STANDING):  enoxaparin Injectable 40 milliGRAM(s) SubCutaneous every 24 hours  furosemide    Tablet 20 milliGRAM(s) Oral two times a day  influenza   Vaccine 0.5 milliLiter(s) IntraMuscular once  pantoprazole    Tablet 40 milliGRAM(s) Oral before breakfast  spironolactone 50 milliGRAM(s) Oral at bedtime    MEDICATIONS  (PRN):    PAST MEDICAL & SURGICAL HISTORY:  Cirrhosis of liver with ascites      HTN (hypertension)        FAMILY HISTORY:  FH: liver disease (Father)    FH: pancreatic disease (Sibling)      SOCIAL HISTORY:  [place]: [ ] Current, [ ] Former, [ ] Never; Pack Years:  Alcohol:  Illicit Drugs:    REVIEW OF SYSTEMS:  CONSTITUTIONAL: No weakness, fevers or chills  HEENT: No visual changes; No vertigo or throat pain   NECK: No pain or stiffness  RESPIRATORY: No cough, wheezing, hemoptysis; No shortness of breath  CARDIOVASCULAR: No chest pain or palpitations  GASTROINTESTINAL: No abdominal or epigastric pain. No nausea, vomiting, or hematemesis; No diarrhea or constipation. No melena or hematochezia.  GENITOURINARY: No dysuria, frequency or hematuria  NEUROLOGICAL: No numbness or weakness  SKIN: No itching, burning, rashes, or lesions   All other 10 review of systems is negative unless indicated above.    Vital Signs Last 24 Hrs  T(C): 37.3 (26 Feb 2024 05:23), Max: 37.3 (26 Feb 2024 05:23)  T(F): 99.1 (26 Feb 2024 05:23), Max: 99.1 (26 Feb 2024 05:23)  HR: 66 (26 Feb 2024 05:23) (65 - 88)  BP: 109/70 (26 Feb 2024 05:23) (106/70 - 134/84)  BP(mean): --  RR: 19 (26 Feb 2024 05:23) (16 - 19)  SpO2: 99% (26 Feb 2024 05:23) (99% - 100%)    Parameters below as of 26 Feb 2024 05:23  Patient On (Oxygen Delivery Method): room air          PHYSICAL EXAM:    General: Well developed; well nourished; in no acute distress  Eyes: Anicteric sclerae, moist conjunctivae  HENT: Moist mucous membranes  Neck: Trachea midline, supple  Lungs: Normal respiratory effort, no intercostal retractions  Cardiovascular: RRR  Abdomen: Soft, non-tender non-distended; Normal bowel sounds; No rebound or guarding  Extremities: Normal range of motion, No clubbing, cyanosis or edema  Neurological: Alert and oriented x3  Skin: Warm and dry. No obvious rash    LABS:                        12.3   7.58  )-----------( 269      ( 25 Feb 2024 16:54 )             37.9     02-25    128<L>  |  x   |  x   ----------------------------<  x   x    |  x   |  0.79    Ca    9.0      25 Feb 2024 16:54    TPro  x   /  Alb  x   /  TBili  0.9  /  DBili  x   /  AST  x   /  ALT  x   /  AlkPhos  x   02-25        PT/INR - ( 25 Feb 2024 20:00 )   PT: 12.9 sec;   INR: 1.14          PTT - ( 25 Feb 2024 16:54 )  PTT:31.0 sec    RADIOLOGY & ADDITIONAL STUDIES:     Reviewed   Initial Hepatology Consult Progress Note:     HPI:  63M w PMHx HTN, remote history of alcohol use disorder, and cirrhosis, esophageal varices, and recurrent ascites requiring frequent paracenteses, presenting with abdominal distension. He was admitted to [facility] with similar complaint, where he had 10.7L abdominal fluid drained 2/21 (then complicated by hypotension, improved after administration of 4 bags of albumin). He previously had 11.7L removed on 2/8. Pt states that he recently saw Dr. Vizcarra at his outpatient hepatology clinic, who advised him to present to ED if he develops recurrent ascites, as pt will likely require TIPS procedure but is unable to do undergo said procedure in outpatient setting due to social circumstances.    ED Course:  Labs notable for Hgb 12.3, Na 130, AlkPhos 359, AST 75, ALT 54, Lipase 62  EKG NSR    Hepatology consulted for further management of cirrhosis, and patient is planned for TIPS procedure on Thursday.   Had 11.7L paracentesis on 2/8 followed by 10.7L paracentesis on 2/21. Last seen by in hepatology clinic on 2/15 with plan for TIPS (clarified w/ Dr. Vizcarra) if he develops recurrent ascites. Pt is unable to undergo TIPS outpatient given his social situation.  Labs from 11/2023 with AFP 2.2, KARYN 1:320, SMA 1:20, neg HAV IgM, neg HBsAg, neg IgM HBcAb, and neg HCV Ab.      Allergies  No Known Allergies  Intolerances      Home Medications:  Lasix 40 mg oral tablet: 1 tab(s) orally once a day (25 Feb 2024 18:37)  omeprazole 20 mg oral delayed release tablet: 1 tab(s) orally once a day before breakfast (25 Feb 2024 18:37)  spironolactone 50 mg oral tablet: 1 tab(s) orally once a day (25 Feb 2024 18:38)    MEDICATIONS:  MEDICATIONS  (STANDING):  enoxaparin Injectable 40 milliGRAM(s) SubCutaneous every 24 hours  furosemide    Tablet 20 milliGRAM(s) Oral two times a day  influenza   Vaccine 0.5 milliLiter(s) IntraMuscular once  pantoprazole    Tablet 40 milliGRAM(s) Oral before breakfast  spironolactone 50 milliGRAM(s) Oral at bedtime    MEDICATIONS  (PRN):    PAST MEDICAL & SURGICAL HISTORY:  Cirrhosis of liver with ascites      HTN (hypertension)        FAMILY HISTORY:  FH: liver disease (Father)    FH: pancreatic disease (Sibling)      SOCIAL HISTORY:  [place]: [ ] Current, [ ] Former, [ ] Never; Pack Years:  Alcohol:  Illicit Drugs:    REVIEW OF SYSTEMS:  CONSTITUTIONAL: No weakness, fevers or chills  HEENT: No visual changes; No vertigo or throat pain   NECK: No pain or stiffness  RESPIRATORY: No cough, wheezing, hemoptysis; No shortness of breath  CARDIOVASCULAR: No chest pain or palpitations  GASTROINTESTINAL: No abdominal or epigastric pain. No nausea, vomiting, or hematemesis; No diarrhea or constipation. No melena or hematochezia.  GENITOURINARY: No dysuria, frequency or hematuria  NEUROLOGICAL: No numbness or weakness  SKIN: No itching, burning, rashes, or lesions   All other 10 review of systems is negative unless indicated above.    Vital Signs Last 24 Hrs  T(C): 37.3 (26 Feb 2024 05:23), Max: 37.3 (26 Feb 2024 05:23)  T(F): 99.1 (26 Feb 2024 05:23), Max: 99.1 (26 Feb 2024 05:23)  HR: 66 (26 Feb 2024 05:23) (65 - 88)  BP: 109/70 (26 Feb 2024 05:23) (106/70 - 134/84)  BP(mean): --  RR: 19 (26 Feb 2024 05:23) (16 - 19)  SpO2: 99% (26 Feb 2024 05:23) (99% - 100%)    Parameters below as of 26 Feb 2024 05:23  Patient On (Oxygen Delivery Method): room air          PHYSICAL EXAM:    General: Well developed; well nourished; in no acute distress  Eyes: Anicteric sclerae, moist conjunctivae  HENT: Moist mucous membranes  Neck: Trachea midline, supple  Lungs: Normal respiratory effort, no intercostal retractions  Cardiovascular: RRR  Abdomen: Soft, non-tender non-distended; Normal bowel sounds; No rebound or guarding  Extremities: Normal range of motion, No clubbing, cyanosis or edema  Neurological: Alert and oriented x3  Skin: Warm and dry. No obvious rash    LABS:                        12.3   7.58  )-----------( 269      ( 25 Feb 2024 16:54 )             37.9     02-25    128<L>  |  x   |  x   ----------------------------<  x   x    |  x   |  0.79    Ca    9.0      25 Feb 2024 16:54    TPro  x   /  Alb  x   /  TBili  0.9  /  DBili  x   /  AST  x   /  ALT  x   /  AlkPhos  x   02-25        PT/INR - ( 25 Feb 2024 20:00 )   PT: 12.9 sec;   INR: 1.14          PTT - ( 25 Feb 2024 16:54 )  PTT:31.0 sec    RADIOLOGY & ADDITIONAL STUDIES:     Reviewed

## 2024-02-26 NOTE — PROGRESS NOTE ADULT - SUBJECTIVE AND OBJECTIVE BOX
**INCOMPLETE NOTE    OVERNIGHT EVENTS: None    SUBJECTIVE:  Patient seen and examined at bedside, comfortable, NAD. Denied fever, chest pain, dyspnea, abdominal pain.     Vital Signs Last 12 Hrs  T(F): 98.6 (02-26-24 @ 08:31), Max: 99.1 (02-26-24 @ 05:23)  HR: 69 (02-26-24 @ 08:31) (66 - 69)  BP: 118/83 (02-26-24 @ 08:31) (109/70 - 118/83)  BP(mean): --  RR: 18 (02-26-24 @ 08:31) (18 - 19)  SpO2: 97% (02-26-24 @ 08:31) (97% - 99%)  I&O's Summary      PHYSICAL EXAM:  General: NAD  HEENT: PERRL, EOM, mildly icteric sclera, MMM  Neck: supple, no JVD  Respiratory: CTA B/L; no W/R/R  Cardiovascular: +S1/S2; RRR; no M/R/G  Gastrointestinal: soft, nontender, distended; +BS x4  Extremities: WWP; 2+ peripheral pulses B/L; no LE edema  Skin: normal color and turgor; no rash; skin of extremities very dry, most notably of the hands, palms  Neurologic: AOx4            LABS:                        12.1   5.54  )-----------( 229      ( 26 Feb 2024 08:58 )             37.4     02-25    128<L>  |  x   |  x   ----------------------------<  x   x    |  x   |  0.79    Ca    9.0      25 Feb 2024 16:54    TPro  x   /  Alb  x   /  TBili  0.9  /  DBili  x   /  AST  x   /  ALT  x   /  AlkPhos  x   02-25    PT/INR - ( 26 Feb 2024 08:58 )   PT: 13.8 sec;   INR: 1.22          PTT - ( 25 Feb 2024 16:54 )  PTT:31.0 sec  Urinalysis Basic - ( 25 Feb 2024 16:54 )    Color: x / Appearance: x / SG: x / pH: x  Gluc: 114 mg/dL / Ketone: x  / Bili: x / Urobili: x   Blood: x / Protein: x / Nitrite: x   Leuk Esterase: x / RBC: x / WBC x   Sq Epi: x / Non Sq Epi: x / Bacteria: x          RADIOLOGY & ADDITIONAL TESTS:    MEDICATIONS  (STANDING):  furosemide    Tablet 20 milliGRAM(s) Oral two times a day  influenza   Vaccine 0.5 milliLiter(s) IntraMuscular once  pantoprazole    Tablet 40 milliGRAM(s) Oral before breakfast  spironolactone 50 milliGRAM(s) Oral at bedtime    MEDICATIONS  (PRN):   **INCOMPLETE NOTE    OVERNIGHT EVENTS: None    SUBJECTIVE:  Patient seen and examined at bedside. Patient denies any fever, shortness of breath, chest pain, palpitations, abdominal pain.       Vital Signs Last 12 Hrs  T(F): 98.6 (02-26-24 @ 08:31), Max: 99.1 (02-26-24 @ 05:23)  HR: 69 (02-26-24 @ 08:31) (66 - 69)  BP: 118/83 (02-26-24 @ 08:31) (109/70 - 118/83)  BP(mean): --  RR: 18 (02-26-24 @ 08:31) (18 - 19)  SpO2: 97% (02-26-24 @ 08:31) (97% - 99%)  I&O's Summary      PHYSICAL EXAM:  General: in no acute distress   HEENT: mildly icteric sclera   Neck: supple, no JVD  Respiratory: normal to ascultation bilaterally, no wheezes, rhonchi or crackles   Cardiovascular: +S1/S2; regular rate and rhythm no murmur, gallops or rubs   Gastrointestinal: soft, nontender, distended abdomen, umbilical hernia present with an oval area of hyperpigmentation surrounding the hernia   : enlargement of the left testicle, nontender to palpation, nonerythematous   Extremities: 2+ peripheral pulses B/L; no LE edema  Neurologic: AOx4            LABS:                        12.1   5.54  )-----------( 229      ( 26 Feb 2024 08:58 )             37.4     02-25    128<L>  |  x   |  x   ----------------------------<  x   x    |  x   |  0.79    Ca    9.0      25 Feb 2024 16:54    TPro  x   /  Alb  x   /  TBili  0.9  /  DBili  x   /  AST  x   /  ALT  x   /  AlkPhos  x   02-25    PT/INR - ( 26 Feb 2024 08:58 )   PT: 13.8 sec;   INR: 1.22          PTT - ( 25 Feb 2024 16:54 )  PTT:31.0 sec  Urinalysis Basic - ( 25 Feb 2024 16:54 )    Color: x / Appearance: x / SG: x / pH: x  Gluc: 114 mg/dL / Ketone: x  / Bili: x / Urobili: x   Blood: x / Protein: x / Nitrite: x   Leuk Esterase: x / RBC: x / WBC x   Sq Epi: x / Non Sq Epi: x / Bacteria: x          RADIOLOGY & ADDITIONAL TESTS:    MEDICATIONS  (STANDING):  furosemide    Tablet 20 milliGRAM(s) Oral two times a day  influenza   Vaccine 0.5 milliLiter(s) IntraMuscular once  pantoprazole    Tablet 40 milliGRAM(s) Oral before breakfast  spironolactone 50 milliGRAM(s) Oral at bedtime    MEDICATIONS  (PRN):   OVERNIGHT EVENTS: None    SUBJECTIVE:  Patient seen and examined at bedside. Patient denies any fever, shortness of breath, chest pain, palpitations, abdominal pain.       Vital Signs Last 12 Hrs  T(F): 98.6 (02-26-24 @ 08:31), Max: 99.1 (02-26-24 @ 05:23)  HR: 69 (02-26-24 @ 08:31) (66 - 69)  BP: 118/83 (02-26-24 @ 08:31) (109/70 - 118/83)  BP(mean): --  RR: 18 (02-26-24 @ 08:31) (18 - 19)  SpO2: 97% (02-26-24 @ 08:31) (97% - 99%)  I&O's Summary      PHYSICAL EXAM:  General: in no acute distress   HEENT: mildly icteric sclera   Neck: supple, no JVD  Respiratory: normal to ascultation bilaterally, no wheezes, rhonchi or crackles   Cardiovascular: +S1/S2; regular rate and rhythm no murmur, gallops or rubs   Gastrointestinal: soft, nontender, distended abdomen, umbilical hernia present with an oval area of hyperpigmentation surrounding the hernia   : enlargement of the left testicle, nontender to palpation, nonerythematous   Extremities: 2+ peripheral pulses B/L; no LE edema  Neurologic: AOx4            LABS:                        12.1   5.54  )-----------( 229      ( 26 Feb 2024 08:58 )             37.4     02-25    128<L>  |  x   |  x   ----------------------------<  x   x    |  x   |  0.79    Ca    9.0      25 Feb 2024 16:54    TPro  x   /  Alb  x   /  TBili  0.9  /  DBili  x   /  AST  x   /  ALT  x   /  AlkPhos  x   02-25    PT/INR - ( 26 Feb 2024 08:58 )   PT: 13.8 sec;   INR: 1.22          PTT - ( 25 Feb 2024 16:54 )  PTT:31.0 sec  Urinalysis Basic - ( 25 Feb 2024 16:54 )    Color: x / Appearance: x / SG: x / pH: x  Gluc: 114 mg/dL / Ketone: x  / Bili: x / Urobili: x   Blood: x / Protein: x / Nitrite: x   Leuk Esterase: x / RBC: x / WBC x   Sq Epi: x / Non Sq Epi: x / Bacteria: x          RADIOLOGY & ADDITIONAL TESTS:    MEDICATIONS  (STANDING):  furosemide    Tablet 20 milliGRAM(s) Oral two times a day  influenza   Vaccine 0.5 milliLiter(s) IntraMuscular once  pantoprazole    Tablet 40 milliGRAM(s) Oral before breakfast  spironolactone 50 milliGRAM(s) Oral at bedtime    MEDICATIONS  (PRN):

## 2024-02-26 NOTE — PROGRESS NOTE ADULT - PROBLEM SELECTOR PLAN 2
home medication(s): spirinolactone 50mg qhs, lasix 20mg bid  low concern for sbp (pt is afebrile, without leukocytosis or other toxic s/s, denies abdominal tenderness, no abd ttp on exam)    - continue home medication(s)  - continue to monitor for s/s infection, frequent abd exams  - planned for paracentesis, will otherwise continue w workup as above (see #1)

## 2024-02-26 NOTE — PROGRESS NOTE ADULT - PROBLEM SELECTOR PLAN 4
F: None  E: Replete PRN to K>4, Mg>2  N: DASH diet  DVT PPx: Lovenox 40mg sq  GI PPx: omeprazole 20mg qd (home rx)  Dispo: [facility]  Code: FULL CODE

## 2024-02-26 NOTE — CONSULT NOTE ADULT - ATTENDING COMMENTS
Patient with cirrhosis due to AUD  Refractory ascites with worsening nutritional status   He requires nearly weekly tap  I had  along conversation with him and his daughter and explained the options of weekly tap vs TIPS. Risks associated with TIPS including but not limited to hepatic encephalopathy, infection, worsening liver failure, vascular issue and cardiac issues were discussed in details.   His social/insurance status precludes him from liver transplant and family is aware of this.     Needs LVP   Empiric IV ceftriaxone for 24 hours prior to TIPS  Empiric treatment with Xifaxan and lactulose prior to TIPS  ICU admission post TIPS

## 2024-02-26 NOTE — PROGRESS NOTE ADULT - ASSESSMENT
63M w PMHx HTN, remote history of alcohol use disorder, and cirrhosis, esophageal varices, and recurrent ascites requiring frequent paracenteses, presenting with abdominal distension, admitted for paracentesis & inpatient TIPS procedure. 63M w PMHx HTN, remote history of alcohol use disorder, and cirrhosis, esophageal varices, and recurrent ascites requiring frequent paracenteses, He had 11.7L removed on 2/8, and then he had 10.7 L abdominal fluid drained 2/21 (then complicated by hypotension, improved after administration of 4 bags of albumin). presenting with abdominal distension, admitted for paracentesis & inpatient TIPS procedure.     63M w PMHx HTN, remote history of alcohol use disorder, and cirrhosis, esophageal varices, and recurrent ascites requiring frequent paracenteses, He had 11.7L removed on 2/8, and then he had 10.7 L abdominal fluid drained 2/21 (then complicated by hypotension, improved after administration of 4 bags of albumin), presenting with abdominal distension, admitted for paracentesis & inpatient TIPS procedure.

## 2024-02-27 LAB
ALBUMIN SERPL ELPH-MCNC: 3.3 G/DL — SIGNIFICANT CHANGE UP (ref 3.3–5)
ALP SERPL-CCNC: 310 U/L — HIGH (ref 40–120)
ALT FLD-CCNC: 46 U/L — HIGH (ref 10–45)
ANION GAP SERPL CALC-SCNC: 11 MMOL/L — SIGNIFICANT CHANGE UP (ref 5–17)
APTT BLD: 33.2 SEC — SIGNIFICANT CHANGE UP (ref 24.5–35.6)
AST SERPL-CCNC: 59 U/L — HIGH (ref 10–40)
BASOPHILS # BLD AUTO: 0.07 K/UL — SIGNIFICANT CHANGE UP (ref 0–0.2)
BASOPHILS NFR BLD AUTO: 1.1 % — SIGNIFICANT CHANGE UP (ref 0–2)
BILIRUB SERPL-MCNC: 0.9 MG/DL — SIGNIFICANT CHANGE UP (ref 0.2–1.2)
BUN SERPL-MCNC: 20 MG/DL — SIGNIFICANT CHANGE UP (ref 7–23)
CALCIUM SERPL-MCNC: 8.8 MG/DL — SIGNIFICANT CHANGE UP (ref 8.4–10.5)
CHLORIDE SERPL-SCNC: 97 MMOL/L — SIGNIFICANT CHANGE UP (ref 96–108)
CO2 SERPL-SCNC: 19 MMOL/L — LOW (ref 22–31)
CREAT SERPL-MCNC: 1.05 MG/DL — SIGNIFICANT CHANGE UP (ref 0.5–1.3)
EGFR: 81 ML/MIN/1.73M2 — SIGNIFICANT CHANGE UP
EOSINOPHIL # BLD AUTO: 0.08 K/UL — SIGNIFICANT CHANGE UP (ref 0–0.5)
EOSINOPHIL NFR BLD AUTO: 1.2 % — SIGNIFICANT CHANGE UP (ref 0–6)
GLUCOSE SERPL-MCNC: 94 MG/DL — SIGNIFICANT CHANGE UP (ref 70–99)
HCT VFR BLD CALC: 35.8 % — LOW (ref 39–50)
HGB BLD-MCNC: 11.4 G/DL — LOW (ref 13–17)
IMM GRANULOCYTES NFR BLD AUTO: 0.3 % — SIGNIFICANT CHANGE UP (ref 0–0.9)
INR BLD: 1.14 — SIGNIFICANT CHANGE UP (ref 0.85–1.18)
LYMPHOCYTES # BLD AUTO: 1.49 K/UL — SIGNIFICANT CHANGE UP (ref 1–3.3)
LYMPHOCYTES # BLD AUTO: 22.8 % — SIGNIFICANT CHANGE UP (ref 13–44)
MAGNESIUM SERPL-MCNC: 2.3 MG/DL — SIGNIFICANT CHANGE UP (ref 1.6–2.6)
MCHC RBC-ENTMCNC: 24.9 PG — LOW (ref 27–34)
MCHC RBC-ENTMCNC: 31.8 GM/DL — LOW (ref 32–36)
MCV RBC AUTO: 78.2 FL — LOW (ref 80–100)
MELD SCORE WITH DIALYSIS: 27 POINTS — SIGNIFICANT CHANGE UP
MELD SCORE WITHOUT DIALYSIS: 8 POINTS — SIGNIFICANT CHANGE UP
MONOCYTES # BLD AUTO: 0.62 K/UL — SIGNIFICANT CHANGE UP (ref 0–0.9)
MONOCYTES NFR BLD AUTO: 9.5 % — SIGNIFICANT CHANGE UP (ref 2–14)
NEUTROPHILS # BLD AUTO: 4.25 K/UL — SIGNIFICANT CHANGE UP (ref 1.8–7.4)
NEUTROPHILS NFR BLD AUTO: 65.1 % — SIGNIFICANT CHANGE UP (ref 43–77)
NRBC # BLD: 0 /100 WBCS — SIGNIFICANT CHANGE UP (ref 0–0)
PHOSPHATE SERPL-MCNC: 3.4 MG/DL — SIGNIFICANT CHANGE UP (ref 2.5–4.5)
PLATELET # BLD AUTO: 201 K/UL — SIGNIFICANT CHANGE UP (ref 150–400)
POTASSIUM SERPL-MCNC: 4.8 MMOL/L — SIGNIFICANT CHANGE UP (ref 3.5–5.3)
POTASSIUM SERPL-SCNC: 4.8 MMOL/L — SIGNIFICANT CHANGE UP (ref 3.5–5.3)
PROT SERPL-MCNC: 6.8 G/DL — SIGNIFICANT CHANGE UP (ref 6–8.3)
PROTHROM AB SERPL-ACNC: 13 SEC — SIGNIFICANT CHANGE UP (ref 9.5–13)
RBC # BLD: 4.58 M/UL — SIGNIFICANT CHANGE UP (ref 4.2–5.8)
RBC # FLD: 19.4 % — HIGH (ref 10.3–14.5)
SODIUM SERPL-SCNC: 127 MMOL/L — LOW (ref 135–145)
WBC # BLD: 6.53 K/UL — SIGNIFICANT CHANGE UP (ref 3.8–10.5)
WBC # FLD AUTO: 6.53 K/UL — SIGNIFICANT CHANGE UP (ref 3.8–10.5)

## 2024-02-27 PROCEDURE — 99232 SBSQ HOSP IP/OBS MODERATE 35: CPT | Mod: GC

## 2024-02-27 RX ORDER — FUROSEMIDE 40 MG
20 TABLET ORAL ONCE
Refills: 0 | Status: COMPLETED | OUTPATIENT
Start: 2024-02-27 | End: 2024-02-27

## 2024-02-27 RX ORDER — CEFTRIAXONE 500 MG/1
1000 INJECTION, POWDER, FOR SOLUTION INTRAMUSCULAR; INTRAVENOUS ONCE
Refills: 0 | Status: COMPLETED | OUTPATIENT
Start: 2024-02-27 | End: 2024-02-27

## 2024-02-27 RX ORDER — CEFTRIAXONE 500 MG/1
1000 INJECTION, POWDER, FOR SOLUTION INTRAMUSCULAR; INTRAVENOUS
Refills: 0 | Status: DISCONTINUED | OUTPATIENT
Start: 2024-02-27 | End: 2024-02-27

## 2024-02-27 RX ORDER — CEFTRIAXONE 500 MG/1
1000 INJECTION, POWDER, FOR SOLUTION INTRAMUSCULAR; INTRAVENOUS EVERY 24 HOURS
Refills: 0 | Status: DISCONTINUED | OUTPATIENT
Start: 2024-02-28 | End: 2024-02-29

## 2024-02-27 RX ORDER — SPIRONOLACTONE 25 MG/1
50 TABLET, FILM COATED ORAL ONCE
Refills: 0 | Status: COMPLETED | OUTPATIENT
Start: 2024-02-27 | End: 2024-02-27

## 2024-02-27 RX ADMIN — LACTULOSE 15 GRAM(S): 10 SOLUTION ORAL at 17:40

## 2024-02-27 RX ADMIN — CEFTRIAXONE 100 MILLIGRAM(S): 500 INJECTION, POWDER, FOR SOLUTION INTRAMUSCULAR; INTRAVENOUS at 17:40

## 2024-02-27 RX ADMIN — PANTOPRAZOLE SODIUM 40 MILLIGRAM(S): 20 TABLET, DELAYED RELEASE ORAL at 06:27

## 2024-02-27 RX ADMIN — Medication 20 MILLIGRAM(S): at 06:28

## 2024-02-27 RX ADMIN — Medication 20 MILLIGRAM(S): at 22:10

## 2024-02-27 RX ADMIN — SPIRONOLACTONE 50 MILLIGRAM(S): 25 TABLET, FILM COATED ORAL at 22:10

## 2024-02-27 RX ADMIN — LACTULOSE 15 GRAM(S): 10 SOLUTION ORAL at 06:27

## 2024-02-27 NOTE — PROGRESS NOTE ADULT - ASSESSMENT
63M h/o cirrhosis (c/b recurrent ascites), ETOH use disorder (in remission), who p/w ascites. Hepatology consulted for cirrhosis management, and patient is planned for TIPS with IR on Thursday.     Echocardiogram:    1. Normal left and right ventricular size and systolic function.   2. Normal atria.   3. No significant valvular disease.   4. No pericardial effusion.   5. No prior echo is available for comparison.    CT abdomen/pelvis:   - Liver cirrhosis with signs of portal hypertension including splenomegaly, large volume ascites in all 4 quadrants and recanalized umbilical vein as well as moderate caliber esophageal and gastric varices.   - Ascites extending into the left hemiscrotum and a small umbilical hernia.  - No evidence of focal enhancing liver lesion to suspect hepatocellular carcinoma.    Recommendations:  - Obtain diagnostic/therapeutic paracentesis and please send cell count, cultures, albumin, protein.   - Please give 8g of 25% albumin per liter removed if >5L.  - IR consulted, recommendations appreciated   - Plan for TIPS procedure on Thursday of this week   - Start lactulose 15 g PO BID and rifaximin 550 mg PO BID   - F/u PETH, HAV IgG, HBV surface Ab, HBV core total Ab and daily MELD labs  - Continue pantoprazole 40 mg PO daily   - Continue Lasix 20 mg PO BID and spironolactone 50 mg PO daily (hold on the day of paracentesis)     Case discussed with Dr. James. Hepatology Team will continue to follow.     Rosi Madison D.O.   Gastroenterology Fellow  Weekday 7am-5pm Pager: [phone]  Weeknights/Weekend/Holiday Coverage: Please call the  for contact info     63M h/o cirrhosis (c/b recurrent ascites), ETOH use disorder (in remission), who p/w ascites. Hepatology consulted for cirrhosis management, and patient is planned for TIPS with IR on Thursday.     Echocardiogram:    1. Normal left and right ventricular size and systolic function.   2. Normal atria.   3. No significant valvular disease.   4. No pericardial effusion.   5. No prior echo is available for comparison.    CT abdomen/pelvis:   - Liver cirrhosis with signs of portal hypertension including splenomegaly, large volume ascites in all 4 quadrants and recanalized umbilical vein as well as moderate caliber esophageal and gastric varices.   - Ascites extending into the left hemiscrotum and a small umbilical hernia.  - No evidence of focal enhancing liver lesion to suspect hepatocellular carcinoma.    Recommendations:  - Obtain diagnostic/therapeutic paracentesis and please send cell count, cultures, albumin, protein tomorrow with IR  - Please give 8g of 25% albumin per liter removed if >5L via paracentesis   - IR consulted, recommendations appreciated   - Plan for TIPS procedure tomorrow, so please start 1g ceftriaxone IV q24 today   - NPO except medications after midnight tonight   - Continue lactulose 15 g PO BID and rifaximin 550 mg PO BID   - F/u PETH, HAV IgG, HBV surface Ab, HBV core total Ab and daily MELD labs  - Continue pantoprazole 40 mg PO daily   - Continue Lasix 20 mg PO BID and spironolactone 50 mg PO daily (hold tomorrow on the day of paracentesis)     Case discussed with Dr. James. Hepatology Team will continue to follow.     Rosi Madison D.O.   Gastroenterology Fellow  Weekday 7am-5pm Pager: [phone]  Weeknights/Weekend/Holiday Coverage: Please call the  for contact info

## 2024-02-27 NOTE — PROGRESS NOTE ADULT - SUBJECTIVE AND OBJECTIVE BOX
Hepatology Consult Progress Note:       OVERNIGHT EVENTS: QUINCY.    SUBJECTIVE / INTERVAL HPI:   Patient seen and examined at bedside. States that overall he feels well. Aware of the plan for paracentesis and TIPs procedure on Thursday.     VITAL SIGNS:  Vital Signs Last 24 Hrs  T(C): 36.5 (27 Feb 2024 09:05), Max: 36.7 (26 Feb 2024 21:34)  T(F): 97.7 (27 Feb 2024 09:05), Max: 98 (26 Feb 2024 21:34)  HR: 72 (27 Feb 2024 09:05) (69 - 87)  BP: 112/75 (27 Feb 2024 09:05) (111/76 - 125/81)  BP(mean): --  RR: 18 (27 Feb 2024 09:05) (18 - 18)  SpO2: 98% (27 Feb 2024 09:05) (98% - 98%)    Parameters below as of 27 Feb 2024 09:05  Patient On (Oxygen Delivery Method): room air      PHYSICAL EXAM:  General: No acute distress  Lungs: Normal respiratory effort and no intercostal retractions  Cardiovascular: RRR  Abdomen: Soft, non-tender, distended 2/2 ascites   Neurological: Alert and oriented x3, no asterixis   Skin: Warm and dry. No obvious rash      MEDICATIONS:  MEDICATIONS  (STANDING):  furosemide    Tablet 20 milliGRAM(s) Oral two times a day  influenza   Vaccine 0.5 milliLiter(s) IntraMuscular once  lactulose Syrup 15 Gram(s) Oral two times a day  pantoprazole    Tablet 40 milliGRAM(s) Oral before breakfast  rifAXIMin 550 milliGRAM(s) Oral two times a day  spironolactone 50 milliGRAM(s) Oral at bedtime    MEDICATIONS  (PRN):      ALLERGIES:  Allergies    No Known Allergies    Intolerances        LABS:                        11.4   6.53  )-----------( 201      ( 27 Feb 2024 05:30 )             35.8     02-27    127<L>  |  97  |  20  ----------------------------<  94  4.8   |  19<L>  |  1.05    Ca    8.8      27 Feb 2024 05:30  Phos  3.4     02-27  Mg     2.3     02-27    TPro  6.8  /  Alb  3.3  /  TBili  0.9  /  DBili  x   /  AST  59<H>  /  ALT  46<H>  /  AlkPhos  310<H>  02-27    PT/INR - ( 27 Feb 2024 05:30 )   PT: 13.0 sec;   INR: 1.14          PTT - ( 27 Feb 2024 05:30 )  PTT:33.2 sec  Urinalysis Basic - ( 27 Feb 2024 05:30 )    Color: x / Appearance: x / SG: x / pH: x  Gluc: 94 mg/dL / Ketone: x  / Bili: x / Urobili: x   Blood: x / Protein: x / Nitrite: x   Leuk Esterase: x / RBC: x / WBC x   Sq Epi: x / Non Sq Epi: x / Bacteria: x      CAPILLARY BLOOD GLUCOSE  RADIOLOGY & ADDITIONAL TESTS: Reviewed.

## 2024-02-27 NOTE — PROGRESS NOTE ADULT - SUBJECTIVE AND OBJECTIVE BOX
**INCOMPLETE NOTE    OVERNIGHT EVENTS: None    SUBJECTIVE:  Patient seen and examined at bedside, comfortable, NAD. Denied fever, chest pain, dyspnea, abdominal pain.     Vital Signs Last 12 Hrs  T(F): 97.7 (02-27-24 @ 09:05), Max: 97.8 (02-27-24 @ 06:03)  HR: 72 (02-27-24 @ 09:05) (69 - 72)  BP: 112/75 (02-27-24 @ 09:05) (111/76 - 112/75)  BP(mean): --  RR: 18 (02-27-24 @ 09:05) (18 - 18)  SpO2: 98% (02-27-24 @ 09:05) (98% - 98%)  I&O's Summary      PHYSICAL EXAM:  Constitutional: NAD, comfortable in bed.  HEENT: NC/AT, PERRLA, EOMI, no conjunctival pallor or scleral icterus, MMM  Neck: Supple, no JVD  Respiratory: CTA B/L. No w/r/r.   Cardiovascular: RRR, normal S1 and S2, no m/r/g.   Gastrointestinal: +BS, soft NTND, no guarding or rebound tenderness, no palpable masses   Extremities: wwp; no cyanosis, clubbing or edema.   Vascular: Pulses equal and strong throughout.   Neurological: AAOx3, no CN deficits, strength and sensation intact throughout.   Skin: No gross skin abnormalities or rashes        LABS:                        11.4   6.53  )-----------( 201      ( 27 Feb 2024 05:30 )             35.8     02-27    127<L>  |  97  |  20  ----------------------------<  94  4.8   |  19<L>  |  1.05    Ca    8.8      27 Feb 2024 05:30  Phos  3.4     02-27  Mg     2.3     02-27    TPro  6.8  /  Alb  3.3  /  TBili  0.9  /  DBili  x   /  AST  59<H>  /  ALT  46<H>  /  AlkPhos  310<H>  02-27    PT/INR - ( 27 Feb 2024 05:30 )   PT: 13.0 sec;   INR: 1.14          PTT - ( 27 Feb 2024 05:30 )  PTT:33.2 sec  Urinalysis Basic - ( 27 Feb 2024 05:30 )    Color: x / Appearance: x / SG: x / pH: x  Gluc: 94 mg/dL / Ketone: x  / Bili: x / Urobili: x   Blood: x / Protein: x / Nitrite: x   Leuk Esterase: x / RBC: x / WBC x   Sq Epi: x / Non Sq Epi: x / Bacteria: x          RADIOLOGY & ADDITIONAL TESTS:    MEDICATIONS  (STANDING):  furosemide    Tablet 20 milliGRAM(s) Oral two times a day  influenza   Vaccine 0.5 milliLiter(s) IntraMuscular once  lactulose Syrup 15 Gram(s) Oral two times a day  pantoprazole    Tablet 40 milliGRAM(s) Oral before breakfast  rifAXIMin 550 milliGRAM(s) Oral two times a day  spironolactone 50 milliGRAM(s) Oral at bedtime    MEDICATIONS  (PRN):   **INCOMPLETE NOTE    OVERNIGHT EVENTS: None    SUBJECTIVE:  Patient seen and examined at bedside, comfortable, in no acute distress. Patient denied fever, chest pain, dyspnea, abdominal pain.     Vital Signs Last 12 Hrs  T(F): 97.7 (02-27-24 @ 09:05), Max: 97.8 (02-27-24 @ 06:03)  HR: 72 (02-27-24 @ 09:05) (69 - 72)  BP: 112/75 (02-27-24 @ 09:05) (111/76 - 112/75)  BP(mean): --  RR: 18 (02-27-24 @ 09:05) (18 - 18)  SpO2: 98% (02-27-24 @ 09:05) (98% - 98%)  I&O's Summary      PHYSICAL EXAM:  General: in no acute distress   HEENT: mildly icteric sclera   Neck: supple, no JVD  Respiratory: normal to ascultation bilaterally, no wheezes, rhonchi or crackles   Cardiovascular: +S1/S2; regular rate and rhythm no murmur, gallops or rubs   Gastrointestinal: soft, nontender, distended abdomen, umbilical hernia present with an oval area of hyperpigmentation surrounding the hernia   : enlargement of the left testicle, nontender to palpation, nonerythematous   Extremities: 2+ peripheral pulses B/L; no LE edema  Neurologic: AOx4, no asterixis       LABS:                        11.4   6.53  )-----------( 201      ( 27 Feb 2024 05:30 )             35.8     02-27    127<L>  |  97  |  20  ----------------------------<  94  4.8   |  19<L>  |  1.05    Ca    8.8      27 Feb 2024 05:30  Phos  3.4     02-27  Mg     2.3     02-27    TPro  6.8  /  Alb  3.3  /  TBili  0.9  /  DBili  x   /  AST  59<H>  /  ALT  46<H>  /  AlkPhos  310<H>  02-27    PT/INR - ( 27 Feb 2024 05:30 )   PT: 13.0 sec;   INR: 1.14          PTT - ( 27 Feb 2024 05:30 )  PTT:33.2 sec  Urinalysis Basic - ( 27 Feb 2024 05:30 )    Color: x / Appearance: x / SG: x / pH: x  Gluc: 94 mg/dL / Ketone: x  / Bili: x / Urobili: x   Blood: x / Protein: x / Nitrite: x   Leuk Esterase: x / RBC: x / WBC x   Sq Epi: x / Non Sq Epi: x / Bacteria: x          RADIOLOGY & ADDITIONAL TESTS:    MEDICATIONS  (STANDING):  furosemide    Tablet 20 milliGRAM(s) Oral two times a day  influenza   Vaccine 0.5 milliLiter(s) IntraMuscular once  lactulose Syrup 15 Gram(s) Oral two times a day  pantoprazole    Tablet 40 milliGRAM(s) Oral before breakfast  rifAXIMin 550 milliGRAM(s) Oral two times a day  spironolactone 50 milliGRAM(s) Oral at bedtime    MEDICATIONS  (PRN):

## 2024-02-27 NOTE — PROGRESS NOTE ADULT - PROBLEM SELECTOR PLAN 4
F: None  E: Replete PRN to K>4, Mg>2  N: DASH diet  DVT PPx: Lovenox 40mg sq  GI PPx: omeprazole 20mg qd (home rx)  Dispo: [facility]  Code: FULL CODE

## 2024-02-27 NOTE — PROGRESS NOTE ADULT - PROBLEM SELECTOR PLAN 1
home medication(s): sprinolactone 50mg qd, lasix 40mg qd  pt sent to ed for inpatient tips procedure by his gastroenterologist dr Vizcarra  cirrhosis presumed secondary to past alcohol use, with esophageal varices & recurrent ascites requiring paracentesis (of approximately 22.5L within the last month). 11.7 L on 2/8 and 10.7 L on 2/21.   November 2023 labwork notable for AFP 2.2, KARYN 1:320, SMA 1:20,  HAV IgM, HBsAg, HBcAb IgM, HCV Ab - all negative  gi following, recommendations appreciated  - per GI, follow up labwork:                                                                   HBV surface Ab nonreactive                                                       HBV core total Ab nonreactive                                                       daily MELD labs                                                      PETH,                                                      HAV IgG,                                    imaging:      MRI liver w/wo contrast vs CT A/P w/wo contrast triple phase                                                      TTE  - plan for IR diagnostic/therapeutic paracentesis on 2/27, will send cell count, cultures, albumin, protein,                    rec administration of 8g of 25% albumin per additional liter removed after initial 5L  - plan for NPO for TIPS on 2/27   - continue home medication(s) home medication(s): sprinolactone 50mg qd, lasix 40mg qd  Cirrhosis presumed secondary to past alcohol use, with esophageal varices & recurrent ascites requiring paracentesis (of approximately 22.5L within the last month). 11.7 L on 2/8 and 10.7 L on 2/21.   November 2023 labwork notable for AFP 2.2, KARYN 1:320, SMA 1:20,  HAV IgM, HBsAg, HBcAb IgM, HCV Ab - all negative  - per GI, follow up labwork:                                                                   HBV surface Ab nonreactive                                                       HBV core total Ab nonreactive                                                      HAV IgG nonreactive                                                       MELD lab 2/27: 8                                                       [facility]                                                                                         - plan for IR diagnostic/therapeutic paracentesis on 2/27, will send cell count, cultures, albumin, protein,                    rec administration of 8g of 25% albumin per additional liter removed after initial 5L  - plan for NPO for TIPS on 2/28 or 2/29    -Start lactulose 15 g PO BID and rifaximin 550 mg PO BID   - Continue pantoprazole 40 mg PO daily   - Continue Lasix 20 mg PO BID and spironolactone 50 mg PO daily (hold on the day of paracentesis)

## 2024-02-27 NOTE — PROGRESS NOTE ADULT - PROBLEM SELECTOR PLAN 2
home medication(s): spirinolactone 50mg qhs, lasix 20mg bid  low concern for sbp (pt is afebrile, without leukocytosis or other toxic s/s, denies abdominal tenderness, no abd ttp on exam)    - continue home medication(s)  - continue to monitor for s/s infection, frequent abd exams  - planned for paracentesis, will otherwise continue w workup as above (see #1) - Continue home medication(s): spirinolactone 50mg qhs, lasix 20mg bid  low concern for sbp (pt is afebrile, without leukocytosis or other toxic s/s, denies abdominal tenderness, no abd ttp on exam)  - continue to monitor for s/s infection, frequent abd exams  CT abdomen/pelvis:   - Liver cirrhosis with signs of portal hypertension including splenomegaly, large volume ascites in all 4 quadrants and recanalized umbilical vein as well as moderate caliber esophageal and gastric varices.   - Ascites extending into the left hemiscrotum and a small umbilical hernia.  - No evidence of focal enhancing liver lesion to suspect hepatocellular carcinoma.  Echo:  - no acute findings, normal left and right ventricular size and systolic function. No significant valvular disease, no pericardial effusion.

## 2024-02-27 NOTE — PROGRESS NOTE ADULT - ASSESSMENT
63M w PMHx HTN, remote history of alcohol use disorder, and cirrhosis, esophageal varices, and recurrent ascites requiring frequent paracenteses, He had 11.7L removed on 2/8, and then he had 10.7 L abdominal fluid drained 2/21 (then complicated by hypotension, improved after administration of 4 bags of albumin), presenting with abdominal distension, admitted for paracentesis & inpatient TIPS procedure.

## 2024-02-28 LAB
ALBUMIN FLD-MCNC: 0.7 G/DL — SIGNIFICANT CHANGE UP
ALBUMIN SERPL ELPH-MCNC: 3.5 G/DL — SIGNIFICANT CHANGE UP (ref 3.3–5)
ALBUMIN SERPL ELPH-MCNC: 4 G/DL — SIGNIFICANT CHANGE UP (ref 3.3–5)
ALP SERPL-CCNC: 257 U/L — HIGH (ref 40–120)
ALP SERPL-CCNC: 329 U/L — HIGH (ref 40–120)
ALT FLD-CCNC: 43 U/L — SIGNIFICANT CHANGE UP (ref 10–45)
ALT FLD-CCNC: 48 U/L — HIGH (ref 10–45)
ANION GAP SERPL CALC-SCNC: 14 MMOL/L — SIGNIFICANT CHANGE UP (ref 5–17)
ANION GAP SERPL CALC-SCNC: 6 MMOL/L — SIGNIFICANT CHANGE UP (ref 5–17)
APTT BLD: 32.2 SEC — SIGNIFICANT CHANGE UP (ref 24.5–35.6)
AST SERPL-CCNC: 62 U/L — HIGH (ref 10–40)
AST SERPL-CCNC: 65 U/L — HIGH (ref 10–40)
B PERT IGG+IGM PNL SER: SIGNIFICANT CHANGE UP
BASOPHILS # BLD AUTO: 0.01 K/UL — SIGNIFICANT CHANGE UP (ref 0–0.2)
BASOPHILS # BLD AUTO: 0.06 K/UL — SIGNIFICANT CHANGE UP (ref 0–0.2)
BASOPHILS NFR BLD AUTO: 0.2 % — SIGNIFICANT CHANGE UP (ref 0–2)
BASOPHILS NFR BLD AUTO: 0.8 % — SIGNIFICANT CHANGE UP (ref 0–2)
BILIRUB SERPL-MCNC: 1.1 MG/DL — SIGNIFICANT CHANGE UP (ref 0.2–1.2)
BILIRUB SERPL-MCNC: 1.8 MG/DL — HIGH (ref 0.2–1.2)
BLD GP AB SCN SERPL QL: NEGATIVE — SIGNIFICANT CHANGE UP
BUN SERPL-MCNC: 19 MG/DL — SIGNIFICANT CHANGE UP (ref 7–23)
BUN SERPL-MCNC: 26 MG/DL — HIGH (ref 7–23)
CALCIUM SERPL-MCNC: 9 MG/DL — SIGNIFICANT CHANGE UP (ref 8.4–10.5)
CALCIUM SERPL-MCNC: 9.1 MG/DL — SIGNIFICANT CHANGE UP (ref 8.4–10.5)
CHLORIDE SERPL-SCNC: 101 MMOL/L — SIGNIFICANT CHANGE UP (ref 96–108)
CHLORIDE SERPL-SCNC: 99 MMOL/L — SIGNIFICANT CHANGE UP (ref 96–108)
CO2 SERPL-SCNC: 20 MMOL/L — LOW (ref 22–31)
CO2 SERPL-SCNC: 23 MMOL/L — SIGNIFICANT CHANGE UP (ref 22–31)
COLOR FLD: SIGNIFICANT CHANGE UP
CREAT SERPL-MCNC: 0.82 MG/DL — SIGNIFICANT CHANGE UP (ref 0.5–1.3)
CREAT SERPL-MCNC: 0.98 MG/DL — SIGNIFICANT CHANGE UP (ref 0.5–1.3)
EGFR: 100 ML/MIN/1.73M2 — SIGNIFICANT CHANGE UP
EGFR: 88 ML/MIN/1.73M2 — SIGNIFICANT CHANGE UP
EOSINOPHIL # BLD AUTO: 0 K/UL — SIGNIFICANT CHANGE UP (ref 0–0.5)
EOSINOPHIL # BLD AUTO: 0.05 K/UL — SIGNIFICANT CHANGE UP (ref 0–0.5)
EOSINOPHIL NFR BLD AUTO: 0 % — SIGNIFICANT CHANGE UP (ref 0–6)
EOSINOPHIL NFR BLD AUTO: 0.6 % — SIGNIFICANT CHANGE UP (ref 0–6)
FLUID INTAKE SUBSTANCE CLASS: SIGNIFICANT CHANGE UP
GLUCOSE FLD-MCNC: 115 MG/DL — SIGNIFICANT CHANGE UP
GLUCOSE SERPL-MCNC: 107 MG/DL — HIGH (ref 70–99)
GLUCOSE SERPL-MCNC: 123 MG/DL — HIGH (ref 70–99)
GRAM STN FLD: SIGNIFICANT CHANGE UP
HCT VFR BLD CALC: 29.3 % — LOW (ref 39–50)
HCT VFR BLD CALC: 36.4 % — LOW (ref 39–50)
HGB BLD-MCNC: 11.7 G/DL — LOW (ref 13–17)
HGB BLD-MCNC: 9.7 G/DL — LOW (ref 13–17)
IMM GRANULOCYTES NFR BLD AUTO: 0.3 % — SIGNIFICANT CHANGE UP (ref 0–0.9)
IMM GRANULOCYTES NFR BLD AUTO: 0.3 % — SIGNIFICANT CHANGE UP (ref 0–0.9)
INR BLD: 1.15 — SIGNIFICANT CHANGE UP (ref 0.85–1.18)
INR BLD: 1.44 — HIGH (ref 0.85–1.18)
LDH SERPL L TO P-CCNC: 44 U/L — SIGNIFICANT CHANGE UP
LYMPHOCYTES # BLD AUTO: 0.64 K/UL — LOW (ref 1–3.3)
LYMPHOCYTES # BLD AUTO: 1.44 K/UL — SIGNIFICANT CHANGE UP (ref 1–3.3)
LYMPHOCYTES # BLD AUTO: 11 % — LOW (ref 13–44)
LYMPHOCYTES # BLD AUTO: 18.3 % — SIGNIFICANT CHANGE UP (ref 13–44)
LYMPHOCYTES # FLD: 13 % — SIGNIFICANT CHANGE UP
MAGNESIUM SERPL-MCNC: 2.1 MG/DL — SIGNIFICANT CHANGE UP (ref 1.6–2.6)
MAGNESIUM SERPL-MCNC: 2.4 MG/DL — SIGNIFICANT CHANGE UP (ref 1.6–2.6)
MCHC RBC-ENTMCNC: 25.2 PG — LOW (ref 27–34)
MCHC RBC-ENTMCNC: 25.8 PG — LOW (ref 27–34)
MCHC RBC-ENTMCNC: 32.1 GM/DL — SIGNIFICANT CHANGE UP (ref 32–36)
MCHC RBC-ENTMCNC: 33.1 GM/DL — SIGNIFICANT CHANGE UP (ref 32–36)
MCV RBC AUTO: 77.9 FL — LOW (ref 80–100)
MCV RBC AUTO: 78.3 FL — LOW (ref 80–100)
MELD SCORE WITH DIALYSIS: 28 POINTS — SIGNIFICANT CHANGE UP
MELD SCORE WITHOUT DIALYSIS: 17 POINTS — SIGNIFICANT CHANGE UP
MONOCYTES # BLD AUTO: 0.46 K/UL — SIGNIFICANT CHANGE UP (ref 0–0.9)
MONOCYTES # BLD AUTO: 0.74 K/UL — SIGNIFICANT CHANGE UP (ref 0–0.9)
MONOCYTES NFR BLD AUTO: 7.9 % — SIGNIFICANT CHANGE UP (ref 2–14)
MONOCYTES NFR BLD AUTO: 9.4 % — SIGNIFICANT CHANGE UP (ref 2–14)
MONOS+MACROS # FLD: 4 % — SIGNIFICANT CHANGE UP
NEUTROPHILS # BLD AUTO: 4.69 K/UL — SIGNIFICANT CHANGE UP (ref 1.8–7.4)
NEUTROPHILS # BLD AUTO: 5.57 K/UL — SIGNIFICANT CHANGE UP (ref 1.8–7.4)
NEUTROPHILS NFR BLD AUTO: 70.6 % — SIGNIFICANT CHANGE UP (ref 43–77)
NEUTROPHILS NFR BLD AUTO: 80.6 % — HIGH (ref 43–77)
NEUTROPHILS-BODY FLUID: 83 % — SIGNIFICANT CHANGE UP
NRBC # BLD: 0 /100 WBCS — SIGNIFICANT CHANGE UP (ref 0–0)
NRBC # BLD: 0 /100 WBCS — SIGNIFICANT CHANGE UP (ref 0–0)
PHOSPHATE SERPL-MCNC: 3.2 MG/DL — SIGNIFICANT CHANGE UP (ref 2.5–4.5)
PHOSPHATE SERPL-MCNC: 3.3 MG/DL — SIGNIFICANT CHANGE UP (ref 2.5–4.5)
PLATELET # BLD AUTO: 140 K/UL — LOW (ref 150–400)
PLATELET # BLD AUTO: 213 K/UL — SIGNIFICANT CHANGE UP (ref 150–400)
POTASSIUM SERPL-MCNC: 4.3 MMOL/L — SIGNIFICANT CHANGE UP (ref 3.5–5.3)
POTASSIUM SERPL-MCNC: 4.6 MMOL/L — SIGNIFICANT CHANGE UP (ref 3.5–5.3)
POTASSIUM SERPL-SCNC: 4.3 MMOL/L — SIGNIFICANT CHANGE UP (ref 3.5–5.3)
POTASSIUM SERPL-SCNC: 4.6 MMOL/L — SIGNIFICANT CHANGE UP (ref 3.5–5.3)
PROT FLD-MCNC: 1 G/DL — SIGNIFICANT CHANGE UP
PROT SERPL-MCNC: 7 G/DL — SIGNIFICANT CHANGE UP (ref 6–8.3)
PROT SERPL-MCNC: 7.4 G/DL — SIGNIFICANT CHANGE UP (ref 6–8.3)
PROTHROM AB SERPL-ACNC: 13.1 SEC — HIGH (ref 9.5–13)
PROTHROM AB SERPL-ACNC: 16.2 SEC — HIGH (ref 9.5–13)
RBC # BLD: 3.76 M/UL — LOW (ref 4.2–5.8)
RBC # BLD: 4.65 M/UL — SIGNIFICANT CHANGE UP (ref 4.2–5.8)
RBC # FLD: 19.4 % — HIGH (ref 10.3–14.5)
RBC # FLD: 19.4 % — HIGH (ref 10.3–14.5)
RCV VOL RI: < 2000 /UL — SIGNIFICANT CHANGE UP (ref 0–0)
RH IG SCN BLD-IMP: POSITIVE — SIGNIFICANT CHANGE UP
SODIUM SERPL-SCNC: 130 MMOL/L — LOW (ref 135–145)
SODIUM SERPL-SCNC: 133 MMOL/L — LOW (ref 135–145)
SPECIMEN SOURCE FLD: SIGNIFICANT CHANGE UP
SPECIMEN SOURCE: SIGNIFICANT CHANGE UP
TOTAL NUCLEATED CELL COUNT, BODY FLUID: 774 /UL — SIGNIFICANT CHANGE UP
TUBE TYPE: SIGNIFICANT CHANGE UP
WBC # BLD: 5.82 K/UL — SIGNIFICANT CHANGE UP (ref 3.8–10.5)
WBC # BLD: 7.88 K/UL — SIGNIFICANT CHANGE UP (ref 3.8–10.5)
WBC # FLD AUTO: 5.82 K/UL — SIGNIFICANT CHANGE UP (ref 3.8–10.5)
WBC # FLD AUTO: 7.88 K/UL — SIGNIFICANT CHANGE UP (ref 3.8–10.5)

## 2024-02-28 PROCEDURE — 49083 ABD PARACENTESIS W/IMAGING: CPT

## 2024-02-28 PROCEDURE — 99233 SBSQ HOSP IP/OBS HIGH 50: CPT | Mod: GC

## 2024-02-28 PROCEDURE — 99232 SBSQ HOSP IP/OBS MODERATE 35: CPT

## 2024-02-28 PROCEDURE — 37182 INSERT HEPATIC SHUNT (TIPS): CPT

## 2024-02-28 PROCEDURE — ZZZZZ: CPT

## 2024-02-28 RX ORDER — FUROSEMIDE 40 MG
20 TABLET ORAL
Refills: 0 | Status: DISCONTINUED | OUTPATIENT
Start: 2024-02-29 | End: 2024-02-29

## 2024-02-28 RX ORDER — SPIRONOLACTONE 25 MG/1
50 TABLET, FILM COATED ORAL DAILY
Refills: 0 | Status: DISCONTINUED | OUTPATIENT
Start: 2024-02-29 | End: 2024-02-29

## 2024-02-28 RX ORDER — CHLORHEXIDINE GLUCONATE 213 G/1000ML
1 SOLUTION TOPICAL
Refills: 0 | Status: DISCONTINUED | OUTPATIENT
Start: 2024-02-28 | End: 2024-03-05

## 2024-02-28 RX ORDER — ALBUMIN HUMAN 25 %
150 VIAL (ML) INTRAVENOUS ONCE
Refills: 0 | Status: COMPLETED | OUTPATIENT
Start: 2024-02-28 | End: 2024-02-28

## 2024-02-28 RX ORDER — ONDANSETRON 8 MG/1
4 TABLET, FILM COATED ORAL ONCE
Refills: 0 | Status: COMPLETED | OUTPATIENT
Start: 2024-02-28 | End: 2024-02-28

## 2024-02-28 RX ORDER — ALBUMIN HUMAN 25 %
50 VIAL (ML) INTRAVENOUS
Refills: 0 | Status: DISCONTINUED | OUTPATIENT
Start: 2024-02-28 | End: 2024-02-28

## 2024-02-28 RX ORDER — MORPHINE SULFATE 50 MG/1
1 CAPSULE, EXTENDED RELEASE ORAL ONCE
Refills: 0 | Status: DISCONTINUED | OUTPATIENT
Start: 2024-02-28 | End: 2024-02-28

## 2024-02-28 RX ADMIN — CEFTRIAXONE 100 MILLIGRAM(S): 500 INJECTION, POWDER, FOR SOLUTION INTRAMUSCULAR; INTRAVENOUS at 22:05

## 2024-02-28 RX ADMIN — Medication 150 MILLILITER(S): at 17:15

## 2024-02-28 RX ADMIN — ONDANSETRON 4 MILLIGRAM(S): 8 TABLET, FILM COATED ORAL at 22:05

## 2024-02-28 RX ADMIN — PANTOPRAZOLE SODIUM 40 MILLIGRAM(S): 20 TABLET, DELAYED RELEASE ORAL at 06:06

## 2024-02-28 RX ADMIN — LACTULOSE 15 GRAM(S): 10 SOLUTION ORAL at 18:28

## 2024-02-28 RX ADMIN — MORPHINE SULFATE 1 MILLIGRAM(S): 50 CAPSULE, EXTENDED RELEASE ORAL at 17:24

## 2024-02-28 RX ADMIN — MORPHINE SULFATE 1 MILLIGRAM(S): 50 CAPSULE, EXTENDED RELEASE ORAL at 18:48

## 2024-02-28 RX ADMIN — LACTULOSE 15 GRAM(S): 10 SOLUTION ORAL at 06:05

## 2024-02-28 NOTE — PROGRESS NOTE ADULT - SUBJECTIVE AND OBJECTIVE BOX
**INCOMPLETE NOTE    OVERNIGHT EVENTS: None    SUBJECTIVE:  Patient seen and examined at bedside, comfortable, NAD. Denied fever, chest pain, dyspnea, abdominal pain.     Vital Signs Last 12 Hrs  T(F): 98.1 (02-28-24 @ 05:20), Max: 98.3 (02-27-24 @ 21:29)  HR: 69 (02-28-24 @ 05:20) (69 - 81)  BP: 112/75 (02-28-24 @ 05:20) (112/75 - 114/77)  BP(mean): --  RR: 17 (02-28-24 @ 05:20) (17 - 17)  SpO2: 97% (02-28-24 @ 05:20) (96% - 97%)  I&O's Summary    27 Feb 2024 07:01  -  28 Feb 2024 07:00  --------------------------------------------------------  IN: 0 mL / OUT: 900 mL / NET: -900 mL        PHYSICAL EXAM:  Constitutional: NAD, comfortable in bed.  HEENT: NC/AT, PERRLA, EOMI, no conjunctival pallor or scleral icterus, MMM  Neck: Supple, no JVD  Respiratory: CTA B/L. No w/r/r.   Cardiovascular: RRR, normal S1 and S2, no m/r/g.   Gastrointestinal: +BS, soft NTND, no guarding or rebound tenderness, no palpable masses   Extremities: wwp; no cyanosis, clubbing or edema.   Vascular: Pulses equal and strong throughout.   Neurological: AAOx3, no CN deficits, strength and sensation intact throughout.   Skin: No gross skin abnormalities or rashes        LABS:                        11.4   6.53  )-----------( 201      ( 27 Feb 2024 05:30 )             35.8     02-27    127<L>  |  97  |  20  ----------------------------<  94  4.8   |  19<L>  |  1.05    Ca    8.8      27 Feb 2024 05:30  Phos  3.4     02-27  Mg     2.3     02-27    TPro  6.8  /  Alb  3.3  /  TBili  0.9  /  DBili  x   /  AST  59<H>  /  ALT  46<H>  /  AlkPhos  310<H>  02-27    PT/INR - ( 27 Feb 2024 05:30 )   PT: 13.0 sec;   INR: 1.14          PTT - ( 27 Feb 2024 05:30 )  PTT:33.2 sec  Urinalysis Basic - ( 27 Feb 2024 05:30 )    Color: x / Appearance: x / SG: x / pH: x  Gluc: 94 mg/dL / Ketone: x  / Bili: x / Urobili: x   Blood: x / Protein: x / Nitrite: x   Leuk Esterase: x / RBC: x / WBC x   Sq Epi: x / Non Sq Epi: x / Bacteria: x          RADIOLOGY & ADDITIONAL TESTS:    MEDICATIONS  (STANDING):  cefTRIAXone   IVPB 1000 milliGRAM(s) IV Intermittent every 24 hours  influenza   Vaccine 0.5 milliLiter(s) IntraMuscular once  lactulose Syrup 15 Gram(s) Oral two times a day  pantoprazole    Tablet 40 milliGRAM(s) Oral before breakfast  rifAXIMin 550 milliGRAM(s) Oral two times a day    MEDICATIONS  (PRN):   **** STEP-UP FROM F TO MICU ***    HPI / HOSPITAL COURSE:    63M w PMHx HTN, remote history of alcohol use disorder, and cirrhosis w esophageal varices and recurrent ascites requiring frequent paracenteses, presenting with abdominal distension as-advised by his outpatient Gastroenterologist, Dr. Oliva. Despite treatment with spirinolactone & lasix (50 & 40mg ricarda, respectively), pt currently has refractory ascites, requiring paracentesis & drainage of approx. 23L abdominal fluid in the last 2 weeks alone. Due to his social circumstances at this time, Mr. Newton is not a candidate for indwelling peritoneal catheter, scheduled TIPS as outpatient, or liver transplantation. In light of his nearly weekly need for peritoneal fluid drainage and concomittant deterioration in nutritional status, Mr. Newton and his family have decided, following extensive discussion of risks, benefits, and alternatives with Dr. Vizcarra and his current inpatient providers, to proceed with inpatient TIPS procedure on his current admission. In anticipation of said procedure, Mr. Newton has undergone the following evaluations & interventions:    Labwork:  Imaging:  Echocardiogram - benign  CT abdomen/pelvis - liver cirrhosis w signs of portal hypertension (including splenomegaly, large volume ascites in all 4 quadrants extending to L hemiscrotum, recanalized umbilical vein, & moderate caliber esophageal and gastric varices), no evidence of focal enhancing liver lesion to suspect hepatocellular carcinoma.  Medications:  Pantoprazole 40mg qd started 2/25  Empiric treatment w rifaximin 550 po bid & lactulose 15g bid started 2/26  1G CTX prophylaxis started 2/27  Interventions:  Peritoneal drainage & TIPS planned for 2/28;                  Following TIPS procedure, pt will be at increased risk for hepatic encephalopathy, infection, worsening liver failure, and vascular/cardiac sequelae. As such, he will be transferred to ICU for monitoring and treatment in the post-operative period.    OVERNIGHT EVENTS: None    SUBJECTIVE:  Patient seen and examined at bedside, comfortable, NAD. Denied fever, chest pain, dyspnea, abdominal pain.     Vital Signs Last 12 Hrs  T(F): 97.8 (02-28-24 @ 08:48), Max: 98.1 (02-28-24 @ 05:20)  HR: 85 (02-28-24 @ 11:07) (69 - 85)  BP: 128/86 (02-28-24 @ 11:07) (112/75 - 128/86)  BP(mean): --  RR: 17 (02-28-24 @ 11:07) (17 - 17)  SpO2: 99% (02-28-24 @ 11:07) (97% - 99%)  I&O's Summary    27 Feb 2024 07:01  -  28 Feb 2024 07:00  --------------------------------------------------------  IN: 0 mL / OUT: 900 mL / NET: -900 mL        PHYSICAL EXAM:  General: in no acute distress   HEENT: mildly icteric sclera   Neck: supple, no JVD  Respiratory: normal to ascultation bilaterally, no wheezes, rhonchi or crackles   Cardiovascular: +S1/S2; regular rate and rhythm no murmur, gallops or rubs   Gastrointestinal: soft, nontender, distended abdomen, umbilical hernia present with an oval area of hyperpigmentation surrounding the hernia, light tenderness to palpation in right & left lower quadrants.  : enlargement of the left testicle, nontender to palpation, nonerythematous   Extremities: 2+ peripheral pulses B/L; no LE edema  Neurologic: AOx4, no asterixis     LABS:                        11.7   7.88  )-----------( 213      ( 28 Feb 2024 05:30 )             36.4     02-28    130<L>  |  101  |  26<H>  ----------------------------<  107<H>  4.6   |  23  |  0.98    Ca    9.0      28 Feb 2024 05:30  Phos  3.2     02-28  Mg     2.4     02-28    TPro  7.4  /  Alb  3.5  /  TBili  1.1  /  DBili  x   /  AST  62<H>  /  ALT  48<H>  /  AlkPhos  329<H>  02-28    PT/INR - ( 28 Feb 2024 05:30 )   PT: 13.1 sec;   INR: 1.15          PTT - ( 28 Feb 2024 05:30 )  PTT:32.2 sec  Urinalysis Basic - ( 28 Feb 2024 05:30 )    Color: x / Appearance: x / SG: x / pH: x  Gluc: 107 mg/dL / Ketone: x  / Bili: x / Urobili: x   Blood: x / Protein: x / Nitrite: x   Leuk Esterase: x / RBC: x / WBC x   Sq Epi: x / Non Sq Epi: x / Bacteria: x          RADIOLOGY & ADDITIONAL TESTS:    MEDICATIONS  (STANDING):  albumin human 25% IVPB 50 milliLiter(s) IV Intermittent every 10 minutes  cefTRIAXone   IVPB 1000 milliGRAM(s) IV Intermittent every 24 hours  influenza   Vaccine 0.5 milliLiter(s) IntraMuscular once  lactulose Syrup 15 Gram(s) Oral two times a day  pantoprazole    Tablet 40 milliGRAM(s) Oral before breakfast  rifAXIMin 550 milliGRAM(s) Oral two times a day    MEDICATIONS  (PRN):   **** STEP-UP FROM [facility] TO MICU ***    HPI / HOSPITAL COURSE:    63M w PMHx HTN, remote history of alcohol use disorder, and cirrhosis w esophageal varices and recurrent ascites requiring frequent paracenteses, presenting with abdominal distension as-advised by his outpatient Gastroenterologist, Dr. Oilva. Despite treatment with spirinolactone & lasix (50 & 40mg ricarda, respectively), pt currently has refractory ascites, requiring paracentesis & drainage of approx. 23L abdominal fluid in the last 2 weeks alone. Due to his social circumstances at this time, Mr. Newton is not a candidate for indwelling peritoneal catheter, scheduled TIPS as outpatient, or liver transplantation. In light of his nearly weekly need for peritoneal fluid drainage and concomitant deterioration in nutritional status, Mr. Newton and his family have decided, following extensive discussion of risks, benefits, and alternatives with Dr. Vizcarra and his current inpatient providers, to proceed with inpatient TIPS procedure on his current admission. In anticipation of said procedure, Mr. Newton has undergone the following evaluations & interventions:    Labwork:  Pertinent Lab work:   2023 labwork notable for AFP 2.2, KARYN 1:320, SMA 1:20,  HAV IgM, HBsAg, HBcAb IgM, HCV Ab - all negative    During this current admission, the following tests were ordered:   HBV surface Ab nonreactive   HBV core total Ab nonreactive  HAV IgG Reactive   MELD lab 2/27: 8     Pertinent Last lab work on 2/28:  Sodium: 130   Alk phos: 329  AST 62  ALT: 48  BUN: 26 Cr: 0.98    Imaging:  Echocardiogram - benign  CT abdomen/pelvis - liver cirrhosis w signs of portal hypertension (including splenomegaly, large volume ascites in all 4 quadrants extending to L hemiscrotum, recanalized umbilical vein, & moderate caliber esophageal and gastric varices), no evidence of focal enhancing liver lesion to suspect hepatocellular carcinoma.  Medications:  Pantoprazole 40mg qd started 2/25  Empiric treatment w rifaximin 550 po bid & lactulose 15g bid started 2/26  1G CTX prophylaxis started 2/27  Interventions:  Peritoneal drainage & TIPS planned for 2/28;                  Following TIPS procedure, pt will be at increased risk for hepatic encephalopathy, infection, worsening liver failure, and vascular/cardiac sequelae. As such, he will be transferred to ICU for monitoring and treatment in the post-operative period.    OVERNIGHT EVENTS: None    SUBJECTIVE:  Patient seen and examined at bedside, comfortable, NAD. Denied fever, chest pain, dyspnea, abdominal pain.     Vital Signs Last 12 Hrs  T(F): 97.8 (02-28-24 @ 08:48), Max: 98.1 (02-28-24 @ 05:20)  HR: 85 (02-28-24 @ 11:07) (69 - 85)  BP: 128/86 (02-28-24 @ 11:07) (112/75 - 128/86)  BP(mean): --  RR: 17 (02-28-24 @ 11:07) (17 - 17)  SpO2: 99% (02-28-24 @ 11:07) (97% - 99%)  I&O's Summary    27 Feb 2024 07:01  -  28 Feb 2024 07:00  --------------------------------------------------------  IN: 0 mL / OUT: 900 mL / NET: -900 mL        PHYSICAL EXAM:  General: in no acute distress   HEENT: mildly icteric sclera   Neck: supple, no JVD  Respiratory: normal to ascultation bilaterally, no wheezes, rhonchi or crackles   Cardiovascular: +S1/S2; regular rate and rhythm no murmur, gallops or rubs   Gastrointestinal: soft, nontender, distended abdomen, umbilical hernia present with an oval area of hyperpigmentation surrounding the hernia, light tenderness to palpation in right & left lower quadrants.  : enlargement of the left testicle, nontender to palpation, nonerythematous   Extremities: 2+ peripheral pulses B/L; no LE edema  Neurologic: AOx4, no asterixis     LABS:                        11.7   7.88  )-----------( 213      ( 28 Feb 2024 05:30 )             36.4     02-28    130<L>  |  101  |  26<H>  ----------------------------<  107<H>  4.6   |  23  |  0.98    Ca    9.0      28 Feb 2024 05:30  Phos  3.2     02-28  Mg     2.4     02-28    TPro  7.4  /  Alb  3.5  /  TBili  1.1  /  DBili  x   /  AST  62<H>  /  ALT  48<H>  /  AlkPhos  329<H>  02-28    PT/INR - ( 28 Feb 2024 05:30 )   PT: 13.1 sec;   INR: 1.15          PTT - ( 28 Feb 2024 05:30 )  PTT:32.2 sec  Urinalysis Basic - ( 28 Feb 2024 05:30 )    Color: x / Appearance: x / SG: x / pH: x  Gluc: 107 mg/dL / Ketone: x  / Bili: x / Urobili: x   Blood: x / Protein: x / Nitrite: x   Leuk Esterase: x / RBC: x / WBC x   Sq Epi: x / Non Sq Epi: x / Bacteria: x          RADIOLOGY & ADDITIONAL TESTS:    MEDICATIONS  (STANDING):  albumin human 25% IVPB 50 milliLiter(s) IV Intermittent every 10 minutes  cefTRIAXone   IVPB 1000 milliGRAM(s) IV Intermittent every 24 hours  influenza   Vaccine 0.5 milliLiter(s) IntraMuscular once  lactulose Syrup 15 Gram(s) Oral two times a day  pantoprazole    Tablet 40 milliGRAM(s) Oral before breakfast  rifAXIMin 550 milliGRAM(s) Oral two times a day    MEDICATIONS  (PRN):   **** STEP-UP FROM F TO MICU ***    HPI / HOSPITAL COURSE:    63M w PMHx HTN, remote history of alcohol use disorder, and cirrhosis w esophageal varices and recurrent ascites requiring frequent paracenteses, presenting with abdominal distension as-advised by his outpatient Gastroenterologist, Dr. Oliva. Despite treatment with spironolactone & lasix (50 & 40mg daily, respectively), pt currently has refractory ascites, requiring paracentesis & drainage of approx. 23L abdominal fluid in the last 2 weeks alone. Due to his social circumstances at this time, Mr. Newton is not a candidate for indwelling peritoneal catheter, scheduled TIPS as outpatient, or liver transplantation. In light of his nearly weekly need for peritoneal fluid drainage and concomitant deterioration in nutritional status, Mr. Newton and his family have decided, following extensive discussion of risks, benefits, and alternatives with Dr. Vizcarra and his current inpatient providers, to proceed with inpatient TIPS procedure on his current admission. In anticipation of said procedure, Mr. Newton has undergone the following evaluations & interventions:    Labwork:  Pertinent Lab work:   2023 labwork notable for AFP 2.2, KARYN 1:320, SMA 1:20,  HAV IgM, HBsAg, HBcAb IgM, HCV Ab - all negative    During this current admission, the following tests were ordered:   HBV surface Ab nonreactive   HBV core total Ab nonreactive  HAV IgG Reactive   MELD lab 2/27: 8     Pertinent Last lab work on 2/28:  Sodium: 130   Alk phos: 329  AST 62  ALT: 48  BUN: 26 Cr: 0.98    Imaging:  Echocardiogram - benign  CT abdomen/pelvis - liver cirrhosis w signs of portal hypertension (including splenomegaly, large volume ascites in all 4 quadrants extending to L hemiscrotum, recanalized umbilical vein, & moderate caliber esophageal and gastric varices), no evidence of focal enhancing liver lesion to suspect hepatocellular carcinoma.  Medications:  Pantoprazole 40mg qd started 2/25  Empiric treatment w rifaximin 550 po bid & lactulose 15g bid started 2/26  1G CTX prophylaxis started 2/27  Interventions:  Peritoneal drainage & TIPS planned for 2/28;                  Following TIPS procedure, pt will be at increased risk for hepatic encephalopathy, infection, worsening liver failure, and vascular/cardiac sequelae. As such, he will be transferred to ICU for monitoring and treatment in the post-operative period.    OVERNIGHT EVENTS: None    SUBJECTIVE:  Patient seen and examined at bedside, comfortable, NAD. Denied fever, chest pain, dyspnea, abdominal pain.     Vital Signs Last 12 Hrs  T(F): 97.8 (02-28-24 @ 08:48), Max: 98.1 (02-28-24 @ 05:20)  HR: 85 (02-28-24 @ 11:07) (69 - 85)  BP: 128/86 (02-28-24 @ 11:07) (112/75 - 128/86)  BP(mean): --  RR: 17 (02-28-24 @ 11:07) (17 - 17)  SpO2: 99% (02-28-24 @ 11:07) (97% - 99%)  I&O's Summary    27 Feb 2024 07:01  -  28 Feb 2024 07:00  --------------------------------------------------------  IN: 0 mL / OUT: 900 mL / NET: -900 mL        PHYSICAL EXAM:  General: in no acute distress   HEENT: mildly icteric sclera   Neck: supple, no JVD  Respiratory: normal to ascultation bilaterally, no wheezes, rhonchi or crackles   Cardiovascular: +S1/S2; regular rate and rhythm no murmur, gallops or rubs   Gastrointestinal: soft, nontender, distended abdomen, umbilical hernia present with an oval area of hyperpigmentation surrounding the hernia, light tenderness to palpation in right & left lower quadrants.  : enlargement of the left testicle, nontender to palpation, nonerythematous   Extremities: 2+ peripheral pulses B/L; no LE edema  Neurologic: AOx4, no asterixis     LABS:                        11.7   7.88  )-----------( 213      ( 28 Feb 2024 05:30 )             36.4     02-28    130<L>  |  101  |  26<H>  ----------------------------<  107<H>  4.6   |  23  |  0.98    Ca    9.0      28 Feb 2024 05:30  Phos  3.2     02-28  Mg     2.4     02-28    TPro  7.4  /  Alb  3.5  /  TBili  1.1  /  DBili  x   /  AST  62<H>  /  ALT  48<H>  /  AlkPhos  329<H>  02-28    PT/INR - ( 28 Feb 2024 05:30 )   PT: 13.1 sec;   INR: 1.15          PTT - ( 28 Feb 2024 05:30 )  PTT:32.2 sec  Urinalysis Basic - ( 28 Feb 2024 05:30 )    Color: x / Appearance: x / SG: x / pH: x  Gluc: 107 mg/dL / Ketone: x  / Bili: x / Urobili: x   Blood: x / Protein: x / Nitrite: x   Leuk Esterase: x / RBC: x / WBC x   Sq Epi: x / Non Sq Epi: x / Bacteria: x          RADIOLOGY & ADDITIONAL TESTS:    MEDICATIONS  (STANDING):  albumin human 25% IVPB 50 milliLiter(s) IV Intermittent every 10 minutes  cefTRIAXone   IVPB 1000 milliGRAM(s) IV Intermittent every 24 hours  influenza   Vaccine 0.5 milliLiter(s) IntraMuscular once  lactulose Syrup 15 Gram(s) Oral two times a day  pantoprazole    Tablet 40 milliGRAM(s) Oral before breakfast  rifAXIMin 550 milliGRAM(s) Oral two times a day    MEDICATIONS  (PRN):   **** STEP-UP FROM F TO MICU ***    HPI / HOSPITAL COURSE:    63M w PMHx HTN, remote history of alcohol use disorder, and cirrhosis w esophageal varices and recurrent ascites requiring frequent paracenteses, presenting with abdominal distension as-advised by his outpatient Gastroenterologist, Dr. Oliva. Despite treatment with spironolactone & lasix (50 & 40mg daily, respectively), pt currently has refractory ascites, requiring paracentesis & drainage of approx. 23L abdominal fluid in the last 2 weeks alone. Due to his social circumstances at this time, Mr. Newton is not a candidate for indwelling peritoneal catheter, scheduled TIPS as outpatient, or liver transplantation. In light of his nearly weekly need for peritoneal fluid drainage and concomitant deterioration in nutritional status, Mr. Newton and his family have decided, following extensive discussion of risks, benefits, and alternatives with Dr. Vizcarra and his current inpatient providers, to proceed with inpatient TIPS procedure on his current admission. In anticipation of said procedure, Mr. Newton has undergone the following evaluations & interventions:    Labwork:  Pertinent Lab work:   2023 labwork notable for AFP 2.2, KARYN 1:320, SMA 1:20,  HAV IgM, HBsAg, HBcAb IgM, HCV Ab - all negative    During this current admission, the following tests were ordered:   HBV surface Ab nonreactive   HBV core total Ab nonreactive  HAV IgG Reactive   MELD lab 2/27: 8     Pertinent Last lab work on 2/28:  Sodium: 130   Alk phos: 329  AST 62  ALT: 48  BUN: 26 Cr: 0.98    Imaging:  Echocardiogram - benign  CT abdomen/pelvis - liver cirrhosis w signs of portal hypertension (including splenomegaly, large volume ascites in all 4 quadrants extending to L hemiscrotum, recanalized umbilical vein, & moderate caliber esophageal and gastric varices), no evidence of focal enhancing liver lesion to suspect hepatocellular carcinoma.  Medications:  Pantoprazole 40mg qd started 2/25  Empiric treatment w rifaximin 550 po bid & lactulose 15g bid started 2/26  1G CTX prophylaxis started 2/27  Interventions:  Peritoneal drainage & TIPS planned for 2/28;                  Following TIPS procedure, pt will be at increased risk for hepatic encephalopathy, infection, worsening liver failure, and vascular/cardiac sequelae. As such, he will be transferred to ICU for monitoring and treatment in the post-operative period.    OVERNIGHT EVENTS: None    SUBJECTIVE:  Patient seen and examined at bedside, comfortable, NAD. Denied fever, chest pain, dyspnea, abdominal pain.     Vital Signs Last 12 Hrs  T(F): 97.8 (02-28-24 @ 08:48), Max: 98.1 (02-28-24 @ 05:20)  HR: 85 (02-28-24 @ 11:07) (69 - 85)  BP: 128/86 (02-28-24 @ 11:07) (112/75 - 128/86)  BP(mean): --  RR: 17 (02-28-24 @ 11:07) (17 - 17)  SpO2: 99% (02-28-24 @ 11:07) (97% - 99%)  I&O's Summary    27 Feb 2024 07:01  -  28 Feb 2024 07:00  --------------------------------------------------------  IN: 0 mL / OUT: 900 mL / NET: -900 mL        PHYSICAL EXAM:  General: in no acute distress   HEENT: mildly icteric sclera   Neck: supple, no JVD  Respiratory: normal to ascultation bilaterally, no wheezes, rhonchi or crackles   Cardiovascular: +S1/S2; regular rate and rhythm no murmur, gallops or rubs   Gastrointestinal: soft, distended abdomen, umbilical hernia present with an oval area of hyperpigmentation surrounding the hernia, light tenderness to palpation in right & left lower quadrants.  : enlargement of the left testicle, nontender to palpation, nonerythematous   Extremities: 2+ peripheral pulses B/L; no LE edema  Neurologic: AOx4, no asterixis     LABS:                        11.7   7.88  )-----------( 213      ( 28 Feb 2024 05:30 )             36.4     02-28    130<L>  |  101  |  26<H>  ----------------------------<  107<H>  4.6   |  23  |  0.98    Ca    9.0      28 Feb 2024 05:30  Phos  3.2     02-28  Mg     2.4     02-28    TPro  7.4  /  Alb  3.5  /  TBili  1.1  /  DBili  x   /  AST  62<H>  /  ALT  48<H>  /  AlkPhos  329<H>  02-28    PT/INR - ( 28 Feb 2024 05:30 )   PT: 13.1 sec;   INR: 1.15          PTT - ( 28 Feb 2024 05:30 )  PTT:32.2 sec  Urinalysis Basic - ( 28 Feb 2024 05:30 )    Color: x / Appearance: x / SG: x / pH: x  Gluc: 107 mg/dL / Ketone: x  / Bili: x / Urobili: x   Blood: x / Protein: x / Nitrite: x   Leuk Esterase: x / RBC: x / WBC x   Sq Epi: x / Non Sq Epi: x / Bacteria: x          RADIOLOGY & ADDITIONAL TESTS:    MEDICATIONS  (STANDING):  albumin human 25% IVPB 50 milliLiter(s) IV Intermittent every 10 minutes  cefTRIAXone   IVPB 1000 milliGRAM(s) IV Intermittent every 24 hours  influenza   Vaccine 0.5 milliLiter(s) IntraMuscular once  lactulose Syrup 15 Gram(s) Oral two times a day  pantoprazole    Tablet 40 milliGRAM(s) Oral before breakfast  rifAXIMin 550 milliGRAM(s) Oral two times a day    MEDICATIONS  (PRN):   **** STEP-UP FROM F TO MICU ***    HPI / HOSPITAL COURSE:    63M w PMHx HTN, remote history of alcohol use disorder, and cirrhosis w esophageal varices and recurrent ascites requiring frequent paracenteses, presenting with abdominal distension as-advised by his outpatient Gastroenterologist, Dr. Oliva. Despite treatment with spironolactone & lasix (50 & 40mg daily, respectively), pt currently has refractory ascites, requiring paracentesis & drainage of approx. 23L abdominal fluid in the last 2 weeks alone. Due to his social circumstances at this time, Mr. Newton is not a candidate for indwelling peritoneal catheter, scheduled TIPS as outpatient, or liver transplantation. In light of his nearly weekly need for peritoneal fluid drainage and concomitant deterioration in nutritional status, Mr. Newton and his family have decided, following extensive discussion of risks, benefits, and alternatives with Dr. Vizcarra and his current inpatient providers, to proceed with inpatient TIPS procedure on his current admission. In anticipation of said procedure, Mr. Newton has undergone the following evaluations & interventions:    Labwork:  Pertinent Lab work:   2023 labwork notable for AFP 2.2, KARYN 1:320, SMA 1:20,  HAV IgM, HBsAg, HBcAb IgM, HCV Ab - all negative    During this current admission, the following tests were ordered:   HBV surface Ab nonreactive   HBV core total Ab nonreactive  HAV IgG Reactive   MELD lab 2/27: 8     Pertinent Last lab work on 2/28:  Sodium: 130   Alk phos: 329  AST 62  ALT: 48  BUN: 26 Cr: 0.98    Imaging:  Echocardiogram - benign  CT abdomen/pelvis - liver cirrhosis w signs of portal hypertension (including splenomegaly, large volume ascites in all 4 quadrants extending to L hemiscrotum, recanalized umbilical vein, & moderate caliber esophageal and gastric varices), no evidence of focal enhancing liver lesion to suspect hepatocellular carcinoma.  Medications:  Pantoprazole 40mg qd started 2/25  Empiric treatment w rifaximin 550 po bid & lactulose 15g bid started 2/26  1G CTX prophylaxis started 2/27  Interventions:  Peritoneal drainage & TIPS planned for 2/28;                  Following TIPS procedure, pt will be at increased risk for hepatic encephalopathy, infection, worsening liver failure, and vascular/cardiac sequelae. As such, he will be transferred to ICU for monitoring and treatment in the post-operative period.    OVERNIGHT EVENTS: None    SUBJECTIVE:  Patient seen and examined at bedside, comfortable, NAD. Denied fever, chest pain, dyspnea, abdominal pain.     Vital Signs Last 12 Hrs  T(F): 97.8 (02-28-24 @ 08:48), Max: 98.1 (02-28-24 @ 05:20)  HR: 85 (02-28-24 @ 11:07) (69 - 85)  BP: 128/86 (02-28-24 @ 11:07) (112/75 - 128/86)  BP(mean): --  RR: 17 (02-28-24 @ 11:07) (17 - 17)  SpO2: 99% (02-28-24 @ 11:07) (97% - 99%)  I&O's Summary    27 Feb 2024 07:01  -  28 Feb 2024 07:00  --------------------------------------------------------  IN: 0 mL / OUT: 900 mL / NET: -900 mL        PHYSICAL EXAM:  General: in no acute distress   HEENT: mildly icteric sclera   Neck: supple, no JVD  Respiratory: normal to ascultation bilaterally, no wheezes, rhonchi or crackles   Cardiovascular: +S1/S2; regular rate and rhythm no murmur, gallops or rubs   Gastrointestinal: soft, distended abdomen, umbilical hernia present with an oval area of hyperpigmentation surrounding the hernia, light tenderness to palpation in right & left lower quadrants.  : enlargement of the left testicle, nontender to palpation, nonerythematous   Extremities: 2+ peripheral pulses B/L; no LE edema  Neurologic: AOx4, no asterixis     LABS:                        11.7   7.88  )-----------( 213      ( 28 Feb 2024 05:30 )             36.4     02-28    130<L>  |  101  |  26<H>  ----------------------------<  107<H>  4.6   |  23  |  0.98    Ca    9.0      28 Feb 2024 05:30  Phos  3.2     02-28  Mg     2.4     02-28    TPro  7.4  /  Alb  3.5  /  TBili  1.1  /  DBili  x   /  AST  62<H>  /  ALT  48<H>  /  AlkPhos  329<H>  02-28    PT/INR - ( 28 Feb 2024 05:30 )   PT: 13.1 sec;   INR: 1.15      PTT - ( 28 Feb 2024 05:30 )  PTT:32.2 sec  Urinalysis Basic - ( 28 Feb 2024 05:30 )    Color: x / Appearance: x / SG: x / pH: x  Gluc: 107 mg/dL / Ketone: x  / Bili: x / Urobili: x   Blood: x / Protein: x / Nitrite: x   Leuk Esterase: x / RBC: x / WBC x   Sq Epi: x / Non Sq Epi: x / Bacteria: x  RADIOLOGY & ADDITIONAL TESTS:    MEDICATIONS  (STANDING):  albumin human 25% IVPB 50 milliLiter(s) IV Intermittent every 10 minutes  cefTRIAXone   IVPB 1000 milliGRAM(s) IV Intermittent every 24 hours  influenza   Vaccine 0.5 milliLiter(s) IntraMuscular once  lactulose Syrup 15 Gram(s) Oral two times a day  pantoprazole    Tablet 40 milliGRAM(s) Oral before breakfast  rifAXIMin 550 milliGRAM(s) Oral two times a day    MEDICATIONS  (PRN):

## 2024-02-28 NOTE — PROGRESS NOTE ADULT - SUBJECTIVE AND OBJECTIVE BOX
GI Consult Progress Note:     OVERNIGHT EVENTS: QUINCY.    SUBJECTIVE / INTERVAL HPI:   Patient seen and examined at bedside. States that overall he feels well. Aware of plans for TIPS procedure today with IR.       VITAL SIGNS:  Vital Signs Last 24 Hrs  T(C): 36.6 (28 Feb 2024 08:48), Max: 37.1 (27 Feb 2024 17:21)  T(F): 97.8 (28 Feb 2024 08:48), Max: 98.7 (27 Feb 2024 17:21)  HR: 85 (28 Feb 2024 08:48) (69 - 85)  BP: 128/86 (28 Feb 2024 08:48) (112/75 - 128/86)  BP(mean): --  RR: 17 (28 Feb 2024 08:48) (17 - 18)  SpO2: 99% (28 Feb 2024 08:48) (96% - 99%)    Parameters below as of 28 Feb 2024 05:20  Patient On (Oxygen Delivery Method): room air        02-27-24 @ 07:01  -  02-28-24 @ 07:00  --------------------------------------------------------  IN: 0 mL / OUT: 900 mL / NET: -900 mL      PHYSICAL EXAM:  General: No acute distress  Lungs: Normal respiratory effort and no intercostal retractions  Cardiovascular: RRR  Abdomen: Soft, non-tender, but appreciably distended 2/2 ascites   Neurological: Alert and oriented x3, no asterixis   Skin: Warm and dry. No obvious rash      MEDICATIONS:  MEDICATIONS  (STANDING):  cefTRIAXone   IVPB 1000 milliGRAM(s) IV Intermittent every 24 hours  influenza   Vaccine 0.5 milliLiter(s) IntraMuscular once  lactulose Syrup 15 Gram(s) Oral two times a day  pantoprazole    Tablet 40 milliGRAM(s) Oral before breakfast  rifAXIMin 550 milliGRAM(s) Oral two times a day    MEDICATIONS  (PRN):      ALLERGIES:  Allergies    No Known Allergies    Intolerances        LABS:                        11.4   6.53  )-----------( 201      ( 27 Feb 2024 05:30 )             35.8     02-27    127<L>  |  97  |  20  ----------------------------<  94  4.8   |  19<L>  |  1.05    Ca    8.8      27 Feb 2024 05:30  Phos  3.4     02-27  Mg     2.3     02-27    TPro  6.8  /  Alb  3.3  /  TBili  0.9  /  DBili  x   /  AST  59<H>  /  ALT  46<H>  /  AlkPhos  310<H>  02-27    PT/INR - ( 27 Feb 2024 05:30 )   PT: 13.0 sec;   INR: 1.14          PTT - ( 27 Feb 2024 05:30 )  PTT:33.2 sec  Urinalysis Basic - ( 27 Feb 2024 05:30 )    Color: x / Appearance: x / SG: x / pH: x  Gluc: 94 mg/dL / Ketone: x  / Bili: x / Urobili: x   Blood: x / Protein: x / Nitrite: x   Leuk Esterase: x / RBC: x / WBC x   Sq Epi: x / Non Sq Epi: x / Bacteria: x      CAPILLARY BLOOD GLUCOSE  RADIOLOGY & ADDITIONAL TESTS: Reviewed.

## 2024-02-28 NOTE — CONSULT NOTE ADULT - SUBJECTIVE AND OBJECTIVE BOX
63M w PMHx HTN, remote history of alcohol use disorder, and decompensated cirrhosis, esophageal varices, and recurrent ascites requiring frequent paracenteses, He had 11.7L removed on 2/8, and then he had 10.7 L abdominal fluid drained 2/21 (then complicated by hypotension, improved after administration of 4 bags of albumin), presenting with abdominal distension, admitted for paracentesis & inpatient TIPS procedure. S/p CT abd triple phase and echo. IR consulted for TIPS procedure.     Clinical History: ASCITES    FH: liver disease (Father)    FH: pancreatic disease (Sibling)    Handoff    MEWS Score    Cirrhosis    Cirrhosis of liver with ascites    HTN (hypertension)    Ascites    Decompensated hepatic cirrhosis    HTN (hypertension)    Prophylactic measure    Ascites    H/O: liver disease    ABDOMINAL PAIN    SysAdmin_VisitLink        Meds:albumin human 25% IVPB 50 milliLiter(s) IV Intermittent every 10 minutes  cefTRIAXone   IVPB 1000 milliGRAM(s) IV Intermittent every 24 hours  influenza   Vaccine 0.5 milliLiter(s) IntraMuscular once  lactulose Syrup 15 Gram(s) Oral two times a day  pantoprazole    Tablet 40 milliGRAM(s) Oral before breakfast  rifAXIMin 550 milliGRAM(s) Oral two times a day      Allergies:No Known Allergies        Labs:                           11.7   7.88  )-----------( 213      ( 28 Feb 2024 05:30 )             36.4     PT/INR - ( 28 Feb 2024 05:30 )   PT: 13.1 sec;   INR: 1.15          PTT - ( 28 Feb 2024 05:30 )  PTT:32.2 sec  02-28    130<L>  |  101  |  26<H>  ----------------------------<  107<H>  4.6   |  23  |  0.98    Ca    9.0      28 Feb 2024 05:30  Phos  3.2     02-28  Mg     2.4     02-28    TPro  7.4  /  Alb  3.5  /  TBili  1.1  /  DBili  x   /  AST  62<H>  /  ALT  48<H>  /  AlkPhos  329<H>  02-28          Imaging Findings: reviewed

## 2024-02-28 NOTE — PROGRESS NOTE ADULT - ASSESSMENT
63M w PMHx HTN, remote history of alcohol use disorder, and cirrhosis, esophageal varices, and recurrent ascites requiring frequent paracenteses, He had 11.7L removed on 2/8, and then he had 10.7 L abdominal fluid drained 2/21 (then complicated by hypotension, improved after administration of 4 bags of albumin), presenting with abdominal distension, admitted for paracentesis & inpatient TIPS procedure.    #Decompensated hepatic cirrhosis.   ·  Plan: home medication(s): sprinolactone 50mg qd, lasix 40mg qd  Cirrhosis presumed secondary to past alcohol use, with esophageal varices & recurrent ascites requiring paracentesis (of approximately 22.5L within the last month). 11.7 L on 2/8 and 10.7 L on 2/21.   November 2023 labwork notable for AFP 2.2, KARYN 1:320, SMA 1:20,  HAV IgM, HBsAg, HBcAb IgM, HCV Ab - all negative  2/27 1x dose of ctx 1g for sbp ppx pre-procedure  - per GI, follow up labwork:                                                                   HBV surface Ab nonreactive                                                       HBV core total Ab nonreactive                                                      HAV IgG nonreactive                                                       MELD lab 2/27: 8                                                       PET                                                                                         - plan for IR diagnostic/therapeutic paracentesis on 2/27, will send cell count, cultures, albumin, protein,                    rec administration of 8g of 25% albumin per additional liter removed after initial 5L  - plan for NPO for TIPS on 2/28  - lactulose 15 g PO BID and rifaximin 550 mg PO BID - both started 2/27  - Continue pantoprazole 40 mg PO daily   - Continue Lasix 20 mg PO BID and spironolactone 50 mg PO daily (hold on the day of paracentesis).    #Ascites.   ·  Plan: - Continue home medication(s): spirinolactone 50mg qhs, lasix 20mg bid  low concern for sbp (pt is afebrile, without leukocytosis or other toxic s/s, denies abdominal tenderness, no abd ttp on exam)  - continue to monitor for s/s infection, frequent abd exams  CT abdomen/pelvis:   - Liver cirrhosis with signs of portal hypertension including splenomegaly, large volume ascites in all 4 quadrants and recanalized umbilical vein as well as moderate caliber esophageal and gastric varices.   - Ascites extending into the left hemiscrotum and a small umbilical hernia.  - No evidence of focal enhancing liver lesion to suspect hepatocellular carcinoma.  Echo:  - no acute findings, normal left and right ventricular size and systolic function. No significant valvular disease, no pericardial effusion.    #HTN (hypertension).   ·  Plan: ascites tx regimen including home medication(s) spirinolactone & lasix   - continue home medication(s).    #Prophylactic measure.   F: None  E: Replete K>4, Phos >3 Mg>2  N: DASH diet  DVT PPx: Lovenox 40mg sq  GI PPx: Omeprazole 20mg qd (home rx)  Dispo: F  Code: FULL CODE. 63M w PMHx HTN, remote history of alcohol use disorder, and cirrhosis, esophageal varices, and recurrent ascites requiring frequent paracenteses, He had 11.7L removed on 2/8, and then he had 10.7 L abdominal fluid drained 2/21 (then complicated by hypotension, improved after administration of 4 bags of albumin), presenting with abdominal distension, admitted for paracentesis & inpatient TIPS procedure.    CARDS  JEAN PAUL. TTE on 2/26/24: no acute findings, normal left and right ventricular size and systolic function. No significant valvular disease, no pericardial effusion. EF 55-60%  -Continue to monitor vitals closely overnight to prevent from cardio/pulmonary vascular overload    NEURO:  AOx3 per baseline. Patient has no known seizure history. On exam, no asterixes or jaundice. No clinical signs of hepatic encephalopathy currently.  -Continue to monitor with neuro checks.   -Rifaximin 550 mg BID, lactulose 15 mg BID    GI:  #Decompensated hepatic cirrhosis #Ascites  Home medication(s): spironolactone 50mg qd, lasix 40mg qd  Cirrhosis presumed secondary to past alcohol use, with esophageal varices & recurrent ascites requiring paracentesis (of approximately 22.5L within the last month). 11.7 L on 2/8 and 10.7 L on 2/21.   November 2023 labwork notable for AFP 2.2, KARYN 1:320, SMA 1:20,  HAV IgM, HBsAg, HBcAb IgM, HCV Ab - all negative  2/27 1x dose of ctx 1g for sbp ppx pre-procedure  - Ceftriaxone 1 g x 3 days for prophylaxis  - Continue pantoprazole 40 mg PO daily   - Continue Lasix 20 mg PO BID and spironolactone 50 mg PO daily (hold on the day of paracentesis, will start 2/29/24).  - Continue to monitor for s/s infection, frequent abd exams    ENDO:  JEAN PAUL. Glucose range 120s-180s.    RENAL  JEAN PAUL. Creatinine 0.82, non-oliguric urinary output.     HEME  Hg 11-12 during hospital course. Plts 200s, stable.   -CBC to trend s/p procedure. Maintain active T&S. Transfuse Hg <7.    PPX  F: None  E: Replete K>4, Phos >3 Mg>2  N: DASH diet  DVT PPx: Lovenox 40mg sq  GI PPx: Omeprazole 20mg qd (home rx)  Dispo: [facility]  Code: FULL CODE.

## 2024-02-28 NOTE — PROGRESS NOTE ADULT - PROBLEM SELECTOR PLAN 3
ascites tx regimen including home medication(s) spirinolactone & lasix     - continue home medication(s) F: None  E: Replete PRN to K>4, Mg>2  N: DASH diet  DVT PPx: Lovenox 40mg sq  GI PPx: omeprazole 20mg qd (home rx)  Dispo: [facility]  Code: FULL CODE

## 2024-02-28 NOTE — PROGRESS NOTE ADULT - NS ATTEST RISK PROBLEM GEN_ALL_CORE FT
Telephone Encounter by Alicia Redd CMA at 10/02/18 04:59 PM     Author:  Alicia Redd CMA Service:  (none) Author Type:  Certified Medical Assistant     Filed:  10/02/18 04:59 PM Encounter Date:  10/2/2018 Status:  Signed     :  Alicia Redd CMA (Certified Medical Assistant)       From: Lucia Mckinley  To: Adriane Kimble DO  Sent: 10/2/2018  3:24 PM CDT  Subject: Medication Renewal Request    Original authorizing provider: DO Lucia Galeana would like a refill of the following medications:  diclofenac (VOLTAREN) 75 MG EC tablet [Adriane Kimble DO]  metoprolol (TOPROL-XL) 25 MG 24 hr tablet [Adriane Kimble DO]  hydrocodone-acetaminophen (NORCO) 5-325 MG per tablet [Adriane Kimble DO]    Preferred pharmacy: [facility]    Comment:         Revision History        Date/Time User Provider Type Action    > 10/02/18 04:59 PM Alicia Redd CMA Certified Medical Assistant Sign    Attribution information within the note text is not available.            
Decompensated liver cirrhosis  TIPS
Decompensated liver cirrhosis

## 2024-02-28 NOTE — PRE-ANESTHESIA EVALUATION ADULT - NSANTHOSAYNRD_GEN_A_CORE
No. BERE screening performed.  STOP BANG Legend: 0-2 = LOW Risk; 3-4 = INTERMEDIATE Risk; 5-8 = HIGH Risk

## 2024-02-28 NOTE — PROGRESS NOTE ADULT - PROBLEM SELECTOR PLAN 2
- Continue home medication(s): spirinolactone 50mg qhs, lasix 20mg bid  low concern for sbp (pt is afebrile, without leukocytosis or other toxic s/s, denies abdominal tenderness, no abd ttp on exam)  - continue to monitor for s/s infection, frequent abd exams  CT abdomen/pelvis:   - Liver cirrhosis with signs of portal hypertension including splenomegaly, large volume ascites in all 4 quadrants and recanalized umbilical vein as well as moderate caliber esophageal and gastric varices.   - Ascites extending into the left hemiscrotum and a small umbilical hernia.  - No evidence of focal enhancing liver lesion to suspect hepatocellular carcinoma.  Echo:  - no acute findings, normal left and right ventricular size and systolic function. No significant valvular disease, no pericardial effusion. ascites tx regimen including home medication(s) spirinolactone & lasix     - continue home medication(s)

## 2024-02-28 NOTE — PROGRESS NOTE ADULT - PROBLEM SELECTOR PLAN 1
home medication(s): sprinolactone 50mg qd, lasix 40mg qd  Cirrhosis presumed secondary to past alcohol use, with esophageal varices & recurrent ascites requiring paracentesis (of approximately 22.5L within the last month). 11.7 L on 2/8 and 10.7 L on 2/21.   November 2023 labwork notable for AFP 2.2, KARYN 1:320, SMA 1:20,  HAV IgM, HBsAg, HBcAb IgM, HCV Ab - all negative  2/27 1x dose of ctx 1g for sbp ppx pre-procedure  - per GI, follow up labwork:                                                                   HBV surface Ab nonreactive                                                       HBV core total Ab nonreactive                                                      HAV IgG nonreactive                                                       MELD lab 2/27: 8                                                       PET                                                                                         - plan for IR diagnostic/therapeutic paracentesis on 2/27, will send cell count, cultures, albumin, protein,                    rec administration of 8g of 25% albumin per additional liter removed after initial 5L  - plan for NPO for TIPS on 2/28  - lactulose 15 g PO BID and rifaximin 550 mg PO BID - both started 2/27  - Continue pantoprazole 40 mg PO daily   - Continue Lasix 20 mg PO BID and spironolactone 50 mg PO daily (hold on the day of paracentesis) home medication(s): sprinolactone 50mg qd, lasix 40mg qd  Cirrhosis presumed secondary to past alcohol use, with esophageal varices & recurrent ascites requiring frequent paracentesis (of approximately 22.5L within the last month). 11.7 L on 2/8 and 10.7 L on 2/21.   November 2023 labwork notable for AFP 2.2, KARYN 1:320, SMA 1:20,  HAV IgM, HBsAg, HBcAb IgM, HCV Ab - all negative  - plan for IR diagnostic/therapeutic paracentesis on 2/28, follow up cell count, cultures, albumin, protein                    rec administration of 8g of 25% albumin per additional liter removed after initial 5L  - continue lactulose 15 g PO BID and rifaximin 550 mg PO BID - both started 2/27  - continue pantoprazole 40 mg PO daily   - continue ctx 1g q24h  - resume Lasix 20 mg PO BID and spironolactone 50 mg PO daily as appropriate (held on the day of paracentesis)

## 2024-02-28 NOTE — PROGRESS NOTE ADULT - SUBJECTIVE AND OBJECTIVE BOX
***Note in progress***    OVERNIGHT EVENTS: NAEO    SUBJECTIVE / INTERVAL HPI: Patient seen and examined at bedside. Patient denying chest pain, SOB, palpitations, cough. Patient denies fever, chills, HA, Dizziness, N/V, abdominal pain, diarrhea, constipation, hematochezia/melena, dysuria, hematuria, new onset weakness/numbness, LE pain and/or swelling. Remaining ROS negative     PHYSICAL EXAM:  General:NAD.   HEENT: NC/AT; PERRL, anicteric sclera; MMM  Neck: supple  Cardiovascular: +S1/S2, RRR  Respiratory: CTA B/L; no W/R/R  Gastrointestinal: soft, NT/ND; +BSx4  Extremities: WWP; no edema, clubbing or cyanosis  Vascular: 2+ radial, DP/PT pulses B/L  Neurological: AAOx3; no focal deficits  Psychiatric: pleasant mood and affect  Dermatologic: no appreciable wounds or damage to the skin    VITAL SIGNS:  Vital Signs Last 24 Hrs  T(C): 36.6 (28 Feb 2024 11:07), Max: 37.1 (27 Feb 2024 17:21)  T(F): 97.8 (28 Feb 2024 08:48), Max: 98.7 (27 Feb 2024 17:21)  HR: 85 (28 Feb 2024 11:07) (69 - 85)  BP: 128/86 (28 Feb 2024 11:07) (112/75 - 128/86)  BP(mean): --  RR: 17 (28 Feb 2024 11:07) (17 - 18)  SpO2: 99% (28 Feb 2024 11:07) (96% - 99%)  Parameters below as of 28 Feb 2024 05:20  Patient On (Oxygen Delivery Method): room air    MEDICATIONS:  MEDICATIONS  (STANDING):  albumin human 25% IVPB 50 milliLiter(s) IV Intermittent every 10 minutes  cefTRIAXone   IVPB 1000 milliGRAM(s) IV Intermittent every 24 hours  influenza   Vaccine 0.5 milliLiter(s) IntraMuscular once  lactulose Syrup 15 Gram(s) Oral two times a day  pantoprazole    Tablet 40 milliGRAM(s) Oral before breakfast  rifAXIMin 550 milliGRAM(s) Oral two times a day  MEDICATIONS  (PRN):    ALLERGIES:  Allergies  No Known Allergies  Intolerances    LABS:                        11.7   7.88  )-----------( 213      ( 28 Feb 2024 05:30 )             36.4     02-28    130<L>  |  101  |  26<H>  ----------------------------<  107<H>  4.6   |  23  |  0.98    Ca    9.0      28 Feb 2024 05:30  Phos  3.2     02-28  Mg     2.4     02-28  TPro  7.4  /  Alb  3.5  /  TBili  1.1  /  DBili  x   /  AST  62<H>  /  ALT  48<H>  /  AlkPhos  329<H>  02-28  PT/INR - ( 28 Feb 2024 05:30 )   PT: 13.1 sec;   INR: 1.15     PTT - ( 28 Feb 2024 05:30 )  PTT:32.2 sec  Urinalysis Basic - ( 28 Feb 2024 05:30 )  Color: x / Appearance: x / SG: x / pH: x  Gluc: 107 mg/dL / Ketone: x  / Bili: x / Urobili: x   Blood: x / Protein: x / Nitrite: x   Leuk Esterase: x / RBC: x / WBC x   Sq Epi: x / Non Sq Epi: x / Bacteria: x  CAPILLARY BLOOD GLUCOSE  RADIOLOGY & ADDITIONAL TESTS: Reviewed. ***Accepted Transfer from [facility] to [facility] s/p TIPS Procedure***  63M w PMHx HTN, remote history of alcohol use disorder, and cirrhosis w esophageal varices and recurrent ascites requiring frequent paracenteses, presenting with abdominal distension as-advised by his outpatient Gastroenterologist, Dr. Oliva. Despite treatment with spironolactone & lasix (50 & 40mg daily, respectively), pt currently has refractory ascites, requiring paracentesis & drainage of approx. 23L abdominal fluid in the last 2 weeks alone. Due to his social circumstances at this time, Mr. Newton is not a candidate for indwelling peritoneal catheter, scheduled TIPS as outpatient, or liver transplantation. In light of his nearly weekly need for peritoneal fluid drainage and concomitant deterioration in nutritional status, Mr. Newton and his family have decided, following extensive discussion of risks, benefits, and alternatives with Dr. Vizcarra and his current inpatient providers, to proceed with inpatient TIPS procedure on his current admission. In anticipation of said procedure, Mr. Newton has undergone the following evaluations & interventions:    2023 labwork notable for AFP 2.2, KARYN 1:320, SMA 1:20,  HAV IgM, HBsAg, HBcAb IgM, HCV Ab - all negative    During this current admission, the following tests were ordered:   HBV surface Ab nonreactive   HBV core total Ab nonreactive  HAV IgG Reactive   MELD lab 2/27: 8     Pertinent Last lab work on 2/28:  Sodium: 130   Alk phos: 329  AST 62  ALT: 48  BUN: 26 Cr: 0.98    Imaging:  Echocardiogram - benign  CT abdomen/pelvis - liver cirrhosis w signs of portal hypertension (including splenomegaly, large volume ascites in all 4 quadrants extending to L hemiscrotum, recanalized umbilical vein, & moderate caliber esophageal and gastric varices), no evidence of focal enhancing liver lesion to suspect hepatocellular carcinoma.  Medications:  Pantoprazole 40mg qd started 2/25  Empiric treatment w rifaximin 550 po bid & lactulose 15g bid started 2/26  1G CTX prophylaxis started 2/27  Interventions:  Peritoneal drainage & TIPS planned for 2/28;                  Following TIPS procedure, pt will be at increased risk for hepatic encephalopathy, infection, worsening liver failure, and vascular/cardiac sequelae. As such, he will be transferred to ICU for monitoring and treatment in the post-operative period.    OVERNIGHT EVENTS: NAEO    SUBJECTIVE / INTERVAL HPI: Patient seen and examined at bedside. Patient denying chest pain, SOB, palpitations, cough. Patient denies fever, chills, HA, Dizziness, N/V, abdominal pain, diarrhea, constipation, hematochezia/melena, dysuria, hematuria, new onset weakness/numbness, LE pain and/or swelling. Remaining ROS negative     PHYSICAL EXAM:  General: NAD.   HEENT: NC/AT; PERRL, anicteric sclera; MMM  Neck: supple  Cardiovascular: +S1/S2, RRR  Respiratory: CTA B/L; no W/R/R  Gastrointestinal: soft, NT/ND; +BSx4  Extremities: WWP; no edema, clubbing or cyanosis  Vascular: 2+ radial, DP/PT pulses B/L  Neurological: AAOx3; no focal deficits  Psychiatric: pleasant mood and affect  Dermatologic: no appreciable wounds or damage to the skin    VITAL SIGNS:  Vital Signs Last 24 Hrs  T(C): 36.6 (28 Feb 2024 11:07), Max: 37.1 (27 Feb 2024 17:21)  T(F): 97.8 (28 Feb 2024 08:48), Max: 98.7 (27 Feb 2024 17:21)  HR: 85 (28 Feb 2024 11:07) (69 - 85)  BP: 128/86 (28 Feb 2024 11:07) (112/75 - 128/86)  BP(mean): --  RR: 17 (28 Feb 2024 11:07) (17 - 18)  SpO2: 99% (28 Feb 2024 11:07) (96% - 99%)  Parameters below as of 28 Feb 2024 05:20  Patient On (Oxygen Delivery Method): room air    MEDICATIONS:  MEDICATIONS  (STANDING):  albumin human 25% IVPB 50 milliLiter(s) IV Intermittent every 10 minutes  cefTRIAXone   IVPB 1000 milliGRAM(s) IV Intermittent every 24 hours  influenza   Vaccine 0.5 milliLiter(s) IntraMuscular once  lactulose Syrup 15 Gram(s) Oral two times a day  pantoprazole    Tablet 40 milliGRAM(s) Oral before breakfast  rifAXIMin 550 milliGRAM(s) Oral two times a day  MEDICATIONS  (PRN):    ALLERGIES:  Allergies  No Known Allergies  Intolerances    LABS:                        11.7   7.88  )-----------( 213      ( 28 Feb 2024 05:30 )             36.4     02-28    130<L>  |  101  |  26<H>  ----------------------------<  107<H>  4.6   |  23  |  0.98    Ca    9.0      28 Feb 2024 05:30  Phos  3.2     02-28  Mg     2.4     02-28  TPro  7.4  /  Alb  3.5  /  TBili  1.1  /  DBili  x   /  AST  62<H>  /  ALT  48<H>  /  AlkPhos  329<H>  02-28  PT/INR - ( 28 Feb 2024 05:30 )   PT: 13.1 sec;   INR: 1.15     PTT - ( 28 Feb 2024 05:30 )  PTT:32.2 sec  Urinalysis Basic - ( 28 Feb 2024 05:30 )  Color: x / Appearance: x / SG: x / pH: x  Gluc: 107 mg/dL / Ketone: x  / Bili: x / Urobili: x   Blood: x / Protein: x / Nitrite: x   Leuk Esterase: x / RBC: x / WBC x   Sq Epi: x / Non Sq Epi: x / Bacteria: x  CAPILLARY BLOOD GLUCOSE  RADIOLOGY & ADDITIONAL TESTS: Reviewed. ***[facility] s/p TIPS Procedure***  63M w PMHx HTN, remote history of alcohol use disorder, and cirrhosis w esophageal varices and recurrent ascites requiring frequent paracenteses, presenting with abdominal distension as-advised by his outpatient Gastroenterologist, Dr. Oliva. Despite treatment with spironolactone & lasix (50 & 40mg daily, respectively), pt currently has refractory ascites, requiring paracentesis & drainage of approx. 23L abdominal fluid in the last 2 weeks alone. Due to his social circumstances at this time, Mr. Newton is not a candidate for indwelling peritoneal catheter, scheduled TIPS as outpatient, or liver transplantation. In light of his nearly weekly need for peritoneal fluid drainage and concomitant deterioration in nutritional status, Mr. eNwton and his family have decided, following extensive discussion of risks, benefits, and alternatives with Dr. Vizcarra and his current inpatient providers, to proceed with inpatient TIPS procedure on his current admission. Patient had paracentesis and TIPS procedure performed today with IR team, and now is transferred to MICU service for close monitoring for possible complications, such as hepatic encephalopathy, cardiovascular or pulmonary congestion, or hemorrhages or active bleeding. Patient arrives to MICU with VSS, tolerating PO diet, and endorses some abdominal pain, treatment with pain medications.     OVERNIGHT EVENTS: NAEO    SUBJECTIVE / INTERVAL HPI: Patient seen and examined at bedside. Patient endorsing 5/10 abdominal pain. He denies chest pain or shortness of breath. Remaining ROS negative     PHYSICAL EXAM:  General: NAD.   HEENT: NC/AT; PERRL, anicteric sclera; MMM  Neck: supple  Cardiovascular: +S1/S2, RRR  Respiratory: CTA B/L  Gastrointestinal: soft, mild distension  Extremities: WWP; no edema  Vascular: 2+ radial, DP/PT pulses B/L  Neurological: AAOx3; no focal deficits  Psychiatric: pleasant mood and affect  Dermatologic: no appreciable wounds or damage to the skin    VITAL SIGNS:  Vital Signs Last 24 Hrs  T(C): 36.6 (28 Feb 2024 11:07), Max: 37.1 (27 Feb 2024 17:21)  T(F): 97.8 (28 Feb 2024 08:48), Max: 98.7 (27 Feb 2024 17:21)  HR: 85 (28 Feb 2024 11:07) (69 - 85)  BP: 128/86 (28 Feb 2024 11:07) (112/75 - 128/86)  BP(mean): --  RR: 17 (28 Feb 2024 11:07) (17 - 18)  SpO2: 99% (28 Feb 2024 11:07) (96% - 99%)  Parameters below as of 28 Feb 2024 05:20  Patient On (Oxygen Delivery Method): room air    MEDICATIONS:  MEDICATIONS  (STANDING):  albumin human 25% IVPB 50 milliLiter(s) IV Intermittent every 10 minutes  cefTRIAXone   IVPB 1000 milliGRAM(s) IV Intermittent every 24 hours  influenza   Vaccine 0.5 milliLiter(s) IntraMuscular once  lactulose Syrup 15 Gram(s) Oral two times a day  pantoprazole    Tablet 40 milliGRAM(s) Oral before breakfast  rifAXIMin 550 milliGRAM(s) Oral two times a day  MEDICATIONS  (PRN):    ALLERGIES:  Allergies  No Known Allergies  Intolerances    LABS:                        11.7   7.88  )-----------( 213      ( 28 Feb 2024 05:30 )             36.4     02-28    130<L>  |  101  |  26<H>  ----------------------------<  107<H>  4.6   |  23  |  0.98    Ca    9.0      28 Feb 2024 05:30  Phos  3.2     02-28  Mg     2.4     02-28  TPro  7.4  /  Alb  3.5  /  TBili  1.1  /  DBili  x   /  AST  62<H>  /  ALT  48<H>  /  AlkPhos  329<H>  02-28  PT/INR - ( 28 Feb 2024 05:30 )   PT: 13.1 sec;   INR: 1.15     PTT - ( 28 Feb 2024 05:30 )  PTT:32.2 sec  Urinalysis Basic - ( 28 Feb 2024 05:30 )  Color: x / Appearance: x / SG: x / pH: x  Gluc: 107 mg/dL / Ketone: x  / Bili: x / Urobili: x   Blood: x / Protein: x / Nitrite: x   Leuk Esterase: x / RBC: x / WBC x   Sq Epi: x / Non Sq Epi: x / Bacteria: x  CAPILLARY BLOOD GLUCOSE  RADIOLOGY & ADDITIONAL TESTS: Reviewed.    CT Abdomen/Pelvis 2/26/24:  - Liver cirrhosis with signs of portal hypertension including splenomegaly, large volume ascites in all 4 quadrants and recanalized umbilical vein as well as moderate caliber esophageal and gastric varices.   - Ascites extending into the left hemiscrotum and a small umbilical hernia.  - No evidence of focal enhancing liver lesion to suspect hepatocellular carcinoma.  Echo:    TTE 2/26/24:  -The left ventricle is normal in size, wall thickness, and systolic   function with a visually estimated ejection fraction of 55-60%. There are   no regional wall motion abnormalities seen.   -No acute findings, normal left and right ventricular size and systolic function. No significant valvular disease, no pericardial effusion.

## 2024-02-28 NOTE — PROGRESS NOTE ADULT - ASSESSMENT
63M h/o cirrhosis (c/b recurrent ascites), ETOH use disorder (in remission), who p/w ascites. Hepatology consulted for cirrhosis management, and patient is planned for TIPS with IR on Thursday.     Echocardiogram:    1. Normal left and right ventricular size and systolic function.   2. Normal atria.   3. No significant valvular disease.   4. No pericardial effusion.   5. No prior echo is available for comparison.    CT abdomen/pelvis:   - Liver cirrhosis with signs of portal hypertension including splenomegaly, large volume ascites in all 4 quadrants and recanalized umbilical vein as well as moderate caliber esophageal and gastric varices.   - Ascites extending into the left hemiscrotum and a small umbilical hernia.  - No evidence of focal enhancing liver lesion to suspect hepatocellular carcinoma.    Planned for IR-guided paracentesis and TIPS procedure today.     Recommendations:  - Obtain diagnostic/therapeutic paracentesis and please send cell count, cultures, albumin, protein tomorrow with IR  - Please give 8g of 25% albumin per liter removed if >5L via paracentesis   - IR consulted, recommendations appreciated   - Plan for paracentesis and TIPS procedure today   - Continue ceftriaxone 1g IV q24 for 5 days total   - Continue lactulose 15 g PO BID and rifaximin 550 mg PO BID   - Continue pantoprazole 40 mg PO daily   - Resume Lasix 20 mg PO BID and spironolactone 50 mg PO daily after paracentesis today   - F/u PETH, HAV IgG, HBV surface Ab, HBV core total Ab and daily MELD labs    Case discussed with Dr. Vizcarra. Hepatology Team will continue to follow.     Rosi Madison D.O.   Gastroenterology Fellow  Weekday 7am-5pm Pager: [phone]  Weeknights/Weekend/Holiday Coverage: Please call the  for contact info

## 2024-02-28 NOTE — PRE-ANESTHESIA EVALUATION ADULT - NSANTHPMHFT_GEN_ALL_CORE
portal HTN secondary to alcoholic cirrhosis with significant ascites requiring recurrent paracentesis Com[place]an-speaking 63M with decompensated alcoholic cirrhosis with sequelae including esophageal and gastric varices, splenomegaly, and significant ascites requiring recurrent paracentesis. Pt presenting to interventional radiology suite for paracentesis and TIPS.    Post-paracentesis fluid replacement recs from GI appreciated- Plan for 8g of 25% albumin per liter removed if >5L drained. I will have the 25% albumin ready in the IR suite. [place] Islander-speaking 63M with decompensated alcoholic cirrhosis with signs of portal HTN and sequelae including esophageal and gastric varices, splenomegaly, and significant ascites requiring recurrent paracentesis. Pt presenting to interventional radiology suite for paracentesis and TIPS.     Post-paracentesis fluid replacement recs from GI appreciated- Plan for 8g of 25% albumin per liter removed if >5L drained. I will have the 25% albumin ready in the IR suite.

## 2024-02-28 NOTE — CONSULT NOTE ADULT - ASSESSMENT
Assessment: 63M w PMHx HTN, remote history of alcohol use disorder, and decompensated cirrhosis, esophageal varices, and recurrent ascites requiring frequent paracenteses, He had 11.7L removed on 2/8, and then he had 10.7 L abdominal fluid drained 2/21 (then complicated by hypotension, improved after administration of 4 bags of albumin), presenting with abdominal distension, admitted for paracentesis & inpatient TIPS procedure. S/p CT abd triple phase and echo. IR consulted for TIPS procedure. Case reviewed with Dr. Estrada, plan for TIPS and paracentesis with anesthesia.    Recommendations: Maintain NPO x 8 hours prior to procedure. D/C blood thinners.     Communicated with: Dr. García primary team, Dr. Zackery PEÑA    
63M h/o cirrhosis (c/b recurrent ascites), ETOH use disorder (in remission), who p/w ascites. Hepatology consulted for cirrhosis management, and patient is planned for TIPS with IR on Thursday.     Echocardiogram:    1. Normal left and right ventricular size and systolic function.   2. Normal atria.   3. No significant valvular disease.   4. No pericardial effusion.   5. No prior echo is available for comparison.    Recommendations:  - Obtain diagnostic/therapeutic paracentesis and please send cell count, cultures, albumin, protein.   - Please give 8g of 25% albumin per liter removed if >5L.  - F/u final read of CT A/P w/ and w/o contrast triple phase  - IR consulted, recommendations appreciated   - Plan for TIPS procedure on Thursday of this week   - Start lactulose 15 g PO BID and rifaximin 550 mg PO BID   - F/u PETH, HAV IgG, HBV surface Ab, HBV core total Ab and daily MELD labs  - Continue pantoprazole 40 mg PO daily   - Continue Lasix 20 mg PO BID and spironolactone 50 mg PO daily (hold on the day of paracentesis)     Case discussed with Dr. Vizcarra. Hepatology Team will continue to follow.     Rosi Madison D.O.   Gastroenterology Fellow  Weekday 7am-5pm Pager: [phone]  Weeknights/Weekend/Holiday Coverage: Please call the  for contact info

## 2024-02-29 LAB
ALBUMIN SERPL ELPH-MCNC: 3.7 G/DL — SIGNIFICANT CHANGE UP (ref 3.3–5)
ALBUMIN SERPL ELPH-MCNC: 4.2 G/DL — SIGNIFICANT CHANGE UP (ref 3.3–5)
ALBUMIN SERPL ELPH-MCNC: 4.4 G/DL — SIGNIFICANT CHANGE UP (ref 3.3–5)
ALP SERPL-CCNC: 203 U/L — HIGH (ref 40–120)
ALP SERPL-CCNC: 233 U/L — HIGH (ref 40–120)
ALP SERPL-CCNC: 270 U/L — HIGH (ref 40–120)
ALT FLD-CCNC: 33 U/L — SIGNIFICANT CHANGE UP (ref 10–45)
ALT FLD-CCNC: 38 U/L — SIGNIFICANT CHANGE UP (ref 10–45)
ALT FLD-CCNC: 46 U/L — HIGH (ref 10–45)
ANION GAP SERPL CALC-SCNC: 10 MMOL/L — SIGNIFICANT CHANGE UP (ref 5–17)
ANION GAP SERPL CALC-SCNC: 7 MMOL/L — SIGNIFICANT CHANGE UP (ref 5–17)
ANION GAP SERPL CALC-SCNC: 9 MMOL/L — SIGNIFICANT CHANGE UP (ref 5–17)
APTT BLD: 37.7 SEC — HIGH (ref 24.5–35.6)
AST SERPL-CCNC: 50 U/L — HIGH (ref 10–40)
AST SERPL-CCNC: 58 U/L — HIGH (ref 10–40)
AST SERPL-CCNC: 73 U/L — HIGH (ref 10–40)
BASOPHILS # BLD AUTO: 0.03 K/UL — SIGNIFICANT CHANGE UP (ref 0–0.2)
BASOPHILS # BLD AUTO: 0.03 K/UL — SIGNIFICANT CHANGE UP (ref 0–0.2)
BASOPHILS # BLD AUTO: 0.05 K/UL — SIGNIFICANT CHANGE UP (ref 0–0.2)
BASOPHILS NFR BLD AUTO: 0.5 % — SIGNIFICANT CHANGE UP (ref 0–2)
BASOPHILS NFR BLD AUTO: 0.5 % — SIGNIFICANT CHANGE UP (ref 0–2)
BASOPHILS NFR BLD AUTO: 0.7 % — SIGNIFICANT CHANGE UP (ref 0–2)
BILIRUB DIRECT SERPL-MCNC: 0.5 MG/DL — HIGH (ref 0–0.3)
BILIRUB INDIRECT FLD-MCNC: 1.7 MG/DL — HIGH (ref 0.2–1)
BILIRUB SERPL-MCNC: 1.7 MG/DL — HIGH (ref 0.2–1.2)
BILIRUB SERPL-MCNC: 2 MG/DL — HIGH (ref 0.2–1.2)
BILIRUB SERPL-MCNC: 2.2 MG/DL — HIGH (ref 0.2–1.2)
BUN SERPL-MCNC: 13 MG/DL — SIGNIFICANT CHANGE UP (ref 7–23)
BUN SERPL-MCNC: 14 MG/DL — SIGNIFICANT CHANGE UP (ref 7–23)
BUN SERPL-MCNC: 14 MG/DL — SIGNIFICANT CHANGE UP (ref 7–23)
CALCIUM SERPL-MCNC: 8.9 MG/DL — SIGNIFICANT CHANGE UP (ref 8.4–10.5)
CALCIUM SERPL-MCNC: 9.3 MG/DL — SIGNIFICANT CHANGE UP (ref 8.4–10.5)
CALCIUM SERPL-MCNC: 9.5 MG/DL — SIGNIFICANT CHANGE UP (ref 8.4–10.5)
CHLORIDE SERPL-SCNC: 96 MMOL/L — SIGNIFICANT CHANGE UP (ref 96–108)
CHLORIDE SERPL-SCNC: 98 MMOL/L — SIGNIFICANT CHANGE UP (ref 96–108)
CHLORIDE SERPL-SCNC: 99 MMOL/L — SIGNIFICANT CHANGE UP (ref 96–108)
CO2 SERPL-SCNC: 22 MMOL/L — SIGNIFICANT CHANGE UP (ref 22–31)
CO2 SERPL-SCNC: 23 MMOL/L — SIGNIFICANT CHANGE UP (ref 22–31)
CO2 SERPL-SCNC: 24 MMOL/L — SIGNIFICANT CHANGE UP (ref 22–31)
CREAT SERPL-MCNC: 0.69 MG/DL — SIGNIFICANT CHANGE UP (ref 0.5–1.3)
CREAT SERPL-MCNC: 0.69 MG/DL — SIGNIFICANT CHANGE UP (ref 0.5–1.3)
CREAT SERPL-MCNC: 0.71 MG/DL — SIGNIFICANT CHANGE UP (ref 0.5–1.3)
EGFR: 104 ML/MIN/1.73M2 — SIGNIFICANT CHANGE UP
EGFR: 105 ML/MIN/1.73M2 — SIGNIFICANT CHANGE UP
EGFR: 105 ML/MIN/1.73M2 — SIGNIFICANT CHANGE UP
EOSINOPHIL # BLD AUTO: 0.01 K/UL — SIGNIFICANT CHANGE UP (ref 0–0.5)
EOSINOPHIL # BLD AUTO: 0.01 K/UL — SIGNIFICANT CHANGE UP (ref 0–0.5)
EOSINOPHIL # BLD AUTO: 0.04 K/UL — SIGNIFICANT CHANGE UP (ref 0–0.5)
EOSINOPHIL NFR BLD AUTO: 0.2 % — SIGNIFICANT CHANGE UP (ref 0–6)
EOSINOPHIL NFR BLD AUTO: 0.2 % — SIGNIFICANT CHANGE UP (ref 0–6)
EOSINOPHIL NFR BLD AUTO: 0.5 % — SIGNIFICANT CHANGE UP (ref 0–6)
GLUCOSE SERPL-MCNC: 106 MG/DL — HIGH (ref 70–99)
GLUCOSE SERPL-MCNC: 123 MG/DL — HIGH (ref 70–99)
GLUCOSE SERPL-MCNC: 136 MG/DL — HIGH (ref 70–99)
HCT VFR BLD CALC: 26.2 % — LOW (ref 39–50)
HCT VFR BLD CALC: 32.4 % — LOW (ref 39–50)
HCT VFR BLD CALC: 33.3 % — LOW (ref 39–50)
HGB BLD-MCNC: 10.3 G/DL — LOW (ref 13–17)
HGB BLD-MCNC: 10.5 G/DL — LOW (ref 13–17)
HGB BLD-MCNC: 8.6 G/DL — LOW (ref 13–17)
IMM GRANULOCYTES NFR BLD AUTO: 0.2 % — SIGNIFICANT CHANGE UP (ref 0–0.9)
IMM GRANULOCYTES NFR BLD AUTO: 0.4 % — SIGNIFICANT CHANGE UP (ref 0–0.9)
IMM GRANULOCYTES NFR BLD AUTO: 0.5 % — SIGNIFICANT CHANGE UP (ref 0–0.9)
INR BLD: 1.43 — HIGH (ref 0.85–1.18)
INR BLD: 1.5 — HIGH (ref 0.85–1.18)
LYMPHOCYTES # BLD AUTO: 0.77 K/UL — LOW (ref 1–3.3)
LYMPHOCYTES # BLD AUTO: 0.83 K/UL — LOW (ref 1–3.3)
LYMPHOCYTES # BLD AUTO: 0.95 K/UL — LOW (ref 1–3.3)
LYMPHOCYTES # BLD AUTO: 11.9 % — LOW (ref 13–44)
LYMPHOCYTES # BLD AUTO: 12.4 % — LOW (ref 13–44)
LYMPHOCYTES # BLD AUTO: 15.1 % — SIGNIFICANT CHANGE UP (ref 13–44)
MAGNESIUM SERPL-MCNC: 2.2 MG/DL — SIGNIFICANT CHANGE UP (ref 1.6–2.6)
MAGNESIUM SERPL-MCNC: 2.4 MG/DL — SIGNIFICANT CHANGE UP (ref 1.6–2.6)
MCHC RBC-ENTMCNC: 25.5 PG — LOW (ref 27–34)
MCHC RBC-ENTMCNC: 25.6 PG — LOW (ref 27–34)
MCHC RBC-ENTMCNC: 25.9 PG — LOW (ref 27–34)
MCHC RBC-ENTMCNC: 31.5 GM/DL — LOW (ref 32–36)
MCHC RBC-ENTMCNC: 31.8 GM/DL — LOW (ref 32–36)
MCHC RBC-ENTMCNC: 32.8 GM/DL — SIGNIFICANT CHANGE UP (ref 32–36)
MCV RBC AUTO: 78.9 FL — LOW (ref 80–100)
MCV RBC AUTO: 80.6 FL — SIGNIFICANT CHANGE UP (ref 80–100)
MCV RBC AUTO: 81 FL — SIGNIFICANT CHANGE UP (ref 80–100)
MELD SCORE WITH DIALYSIS: 29 POINTS — SIGNIFICANT CHANGE UP
MELD SCORE WITH DIALYSIS: 30 POINTS — SIGNIFICANT CHANGE UP
MELD SCORE WITHOUT DIALYSIS: 18 POINTS — SIGNIFICANT CHANGE UP
MELD SCORE WITHOUT DIALYSIS: 21 POINTS — SIGNIFICANT CHANGE UP
MONOCYTES # BLD AUTO: 0.53 K/UL — SIGNIFICANT CHANGE UP (ref 0–0.9)
MONOCYTES # BLD AUTO: 0.54 K/UL — SIGNIFICANT CHANGE UP (ref 0–0.9)
MONOCYTES # BLD AUTO: 0.82 K/UL — SIGNIFICANT CHANGE UP (ref 0–0.9)
MONOCYTES NFR BLD AUTO: 10.7 % — SIGNIFICANT CHANGE UP (ref 2–14)
MONOCYTES NFR BLD AUTO: 8.2 % — SIGNIFICANT CHANGE UP (ref 2–14)
MONOCYTES NFR BLD AUTO: 9.8 % — SIGNIFICANT CHANGE UP (ref 2–14)
NEUTROPHILS # BLD AUTO: 4.05 K/UL — SIGNIFICANT CHANGE UP (ref 1.8–7.4)
NEUTROPHILS # BLD AUTO: 5.13 K/UL — SIGNIFICANT CHANGE UP (ref 1.8–7.4)
NEUTROPHILS # BLD AUTO: 5.76 K/UL — SIGNIFICANT CHANGE UP (ref 1.8–7.4)
NEUTROPHILS NFR BLD AUTO: 73.9 % — SIGNIFICANT CHANGE UP (ref 43–77)
NEUTROPHILS NFR BLD AUTO: 75.3 % — SIGNIFICANT CHANGE UP (ref 43–77)
NEUTROPHILS NFR BLD AUTO: 79 % — HIGH (ref 43–77)
NRBC # BLD: 0 /100 WBCS — SIGNIFICANT CHANGE UP (ref 0–0)
PHOSPHATE SERPL-MCNC: 3.2 MG/DL — SIGNIFICANT CHANGE UP (ref 2.5–4.5)
PHOSPHATE SERPL-MCNC: 3.7 MG/DL — SIGNIFICANT CHANGE UP (ref 2.5–4.5)
PLATELET # BLD AUTO: 118 K/UL — LOW (ref 150–400)
PLATELET # BLD AUTO: 163 K/UL — SIGNIFICANT CHANGE UP (ref 150–400)
PLATELET # BLD AUTO: 184 K/UL — SIGNIFICANT CHANGE UP (ref 150–400)
POTASSIUM SERPL-MCNC: 4.2 MMOL/L — SIGNIFICANT CHANGE UP (ref 3.5–5.3)
POTASSIUM SERPL-MCNC: 4.5 MMOL/L — SIGNIFICANT CHANGE UP (ref 3.5–5.3)
POTASSIUM SERPL-MCNC: 4.7 MMOL/L — SIGNIFICANT CHANGE UP (ref 3.5–5.3)
POTASSIUM SERPL-SCNC: 4.2 MMOL/L — SIGNIFICANT CHANGE UP (ref 3.5–5.3)
POTASSIUM SERPL-SCNC: 4.5 MMOL/L — SIGNIFICANT CHANGE UP (ref 3.5–5.3)
POTASSIUM SERPL-SCNC: 4.7 MMOL/L — SIGNIFICANT CHANGE UP (ref 3.5–5.3)
PROT SERPL-MCNC: 6 G/DL — SIGNIFICANT CHANGE UP (ref 6–8.3)
PROT SERPL-MCNC: 6.7 G/DL — SIGNIFICANT CHANGE UP (ref 6–8.3)
PROT SERPL-MCNC: 6.9 G/DL — SIGNIFICANT CHANGE UP (ref 6–8.3)
PROTHROM AB SERPL-ACNC: 16.1 SEC — HIGH (ref 9.5–13)
PROTHROM AB SERPL-ACNC: 16.9 SEC — HIGH (ref 9.5–13)
RBC # BLD: 3.32 M/UL — LOW (ref 4.2–5.8)
RBC # BLD: 4.02 M/UL — LOW (ref 4.2–5.8)
RBC # BLD: 4.11 M/UL — LOW (ref 4.2–5.8)
RBC # FLD: 19.6 % — HIGH (ref 10.3–14.5)
RBC # FLD: 19.7 % — HIGH (ref 10.3–14.5)
RBC # FLD: 19.9 % — HIGH (ref 10.3–14.5)
SODIUM SERPL-SCNC: 128 MMOL/L — LOW (ref 135–145)
SODIUM SERPL-SCNC: 129 MMOL/L — LOW (ref 135–145)
SODIUM SERPL-SCNC: 131 MMOL/L — LOW (ref 135–145)
WBC # BLD: 5.49 K/UL — SIGNIFICANT CHANGE UP (ref 3.8–10.5)
WBC # BLD: 6.48 K/UL — SIGNIFICANT CHANGE UP (ref 3.8–10.5)
WBC # BLD: 7.65 K/UL — SIGNIFICANT CHANGE UP (ref 3.8–10.5)
WBC # FLD AUTO: 5.49 K/UL — SIGNIFICANT CHANGE UP (ref 3.8–10.5)
WBC # FLD AUTO: 6.48 K/UL — SIGNIFICANT CHANGE UP (ref 3.8–10.5)
WBC # FLD AUTO: 7.65 K/UL — SIGNIFICANT CHANGE UP (ref 3.8–10.5)

## 2024-02-29 PROCEDURE — 99233 SBSQ HOSP IP/OBS HIGH 50: CPT | Mod: GC

## 2024-02-29 RX ORDER — LACTULOSE 10 G/15ML
15 SOLUTION ORAL EVERY 4 HOURS
Refills: 0 | Status: DISCONTINUED | OUTPATIENT
Start: 2024-02-29 | End: 2024-02-29

## 2024-02-29 RX ORDER — ALBUMIN HUMAN 25 %
200 VIAL (ML) INTRAVENOUS
Refills: 0 | Status: DISCONTINUED | OUTPATIENT
Start: 2024-02-29 | End: 2024-03-01

## 2024-02-29 RX ORDER — MORPHINE SULFATE 50 MG/1
2 CAPSULE, EXTENDED RELEASE ORAL ONCE
Refills: 0 | Status: DISCONTINUED | OUTPATIENT
Start: 2024-02-29 | End: 2024-02-29

## 2024-02-29 RX ORDER — LACTULOSE 10 G/15ML
15 SOLUTION ORAL ONCE
Refills: 0 | Status: COMPLETED | OUTPATIENT
Start: 2024-02-29 | End: 2024-02-29

## 2024-02-29 RX ORDER — FUROSEMIDE 40 MG
20 TABLET ORAL EVERY 12 HOURS
Refills: 0 | Status: DISCONTINUED | OUTPATIENT
Start: 2024-02-29 | End: 2024-02-29

## 2024-02-29 RX ORDER — LACTULOSE 10 G/15ML
15 SOLUTION ORAL EVERY 8 HOURS
Refills: 0 | Status: DISCONTINUED | OUTPATIENT
Start: 2024-02-29 | End: 2024-03-02

## 2024-02-29 RX ORDER — CEFTRIAXONE 500 MG/1
2000 INJECTION, POWDER, FOR SOLUTION INTRAMUSCULAR; INTRAVENOUS EVERY 24 HOURS
Refills: 0 | Status: DISCONTINUED | OUTPATIENT
Start: 2024-02-29 | End: 2024-03-05

## 2024-02-29 RX ORDER — SPIRONOLACTONE 25 MG/1
50 TABLET, FILM COATED ORAL DAILY
Refills: 0 | Status: DISCONTINUED | OUTPATIENT
Start: 2024-02-29 | End: 2024-02-29

## 2024-02-29 RX ORDER — CEFTRIAXONE 500 MG/1
2000 INJECTION, POWDER, FOR SOLUTION INTRAMUSCULAR; INTRAVENOUS EVERY 24 HOURS
Refills: 0 | Status: DISCONTINUED | OUTPATIENT
Start: 2024-02-29 | End: 2024-02-29

## 2024-02-29 RX ORDER — LACTULOSE 10 G/15ML
15 SOLUTION ORAL EVERY 8 HOURS
Refills: 0 | Status: DISCONTINUED | OUTPATIENT
Start: 2024-02-29 | End: 2024-02-29

## 2024-02-29 RX ADMIN — MORPHINE SULFATE 2 MILLIGRAM(S): 50 CAPSULE, EXTENDED RELEASE ORAL at 10:00

## 2024-02-29 RX ADMIN — CHLORHEXIDINE GLUCONATE 1 APPLICATION(S): 213 SOLUTION TOPICAL at 07:24

## 2024-02-29 RX ADMIN — LACTULOSE 15 GRAM(S): 10 SOLUTION ORAL at 16:32

## 2024-02-29 RX ADMIN — LACTULOSE 15 GRAM(S): 10 SOLUTION ORAL at 13:36

## 2024-02-29 RX ADMIN — PANTOPRAZOLE SODIUM 40 MILLIGRAM(S): 20 TABLET, DELAYED RELEASE ORAL at 07:24

## 2024-02-29 RX ADMIN — LACTULOSE 15 GRAM(S): 10 SOLUTION ORAL at 03:39

## 2024-02-29 RX ADMIN — MORPHINE SULFATE 2 MILLIGRAM(S): 50 CAPSULE, EXTENDED RELEASE ORAL at 09:34

## 2024-02-29 RX ADMIN — Medication 100 MILLILITER(S): at 13:36

## 2024-02-29 RX ADMIN — LACTULOSE 15 GRAM(S): 10 SOLUTION ORAL at 21:49

## 2024-02-29 RX ADMIN — CEFTRIAXONE 100 MILLIGRAM(S): 500 INJECTION, POWDER, FOR SOLUTION INTRAMUSCULAR; INTRAVENOUS at 22:02

## 2024-02-29 RX ADMIN — LACTULOSE 15 GRAM(S): 10 SOLUTION ORAL at 10:14

## 2024-02-29 NOTE — PROGRESS NOTE ADULT - ASSESSMENT
63M w PMHx HTN, remote history of alcohol use disorder, and cirrhosis, esophageal varices, and recurrent ascites requiring frequent paracenteses, He had 11.7L removed on 2/8, and then he had 10.7 L abdominal fluid drained 2/21 (then complicated by hypotension, improved after administration of 4 bags of albumin), presenting with abdominal distension, s/p paracentesis & inpatient TIPS procedure in MICU for monitoring.     NEURO:  AOx3 per baseline. Patient has no known seizure history. On exam, no asterixes or jaundice. No clinical signs of hepatic encephalopathy currently.  -Continue to monitor with neuro checks.   -Rifaximin 550 mg BID, lactulose 15 mg q8    CARDS  JEAN PAUL. TTE on 2/26/24: no acute findings, normal left and right ventricular size and systolic function. No significant valvular disease, no pericardial effusion. EF 55-60%  -Continue to monitor vitals closely to prevent from cardio/pulmonary vascular overload    GI:  #Decompensated hepatic cirrhosis #Ascites  Home medication(s): spironolactone 50mg qd, lasix 40mg qd  Cirrhosis presumed secondary to past alcohol use, with esophageal varices & recurrent ascites requiring paracentesis (of approximately 22.5L within the last month). 11.7 L on 2/8 and 10.7 L on 2/21.   November 2023 labwork notable for AFP 2.2, KARYN 1:320, SMA 1:20,  HAV IgM, HBsAg, HBcAb IgM, HCV Ab - all negative  2/27 1x dose of ctx 1g for sbp ppx pre-procedure  - Ceftriaxone 1 g x 3 days for prophylaxis  - Continue pantoprazole 40 mg PO daily   - Continue Lasix 20 mg PO BID and spironolactone 50 mg PO daily (hold on the day of paracentesis, will start 2/29/24).  - Continue to monitor for s/s infection, frequent abd exams    ENDO:  JEAN PAUL. Glucose range 120s-180s.    RENAL  JEAN PAUL. Creatinine 0.82, non-oliguric urinary output.     HEME  Hg 11-12 during hospital course. Plts 200s, stable.   -CBC to trend s/p procedure. Maintain active T&S. Transfuse Hg <7.    PPX  F: None  E: Replete K>4, Phos >3 Mg>2  N: DASH diet  DVT PPx: Lovenox 40mg sq  GI PPx: Omeprazole 20mg qd (home rx)  Dispo: [facility]  Code: FULL CODE. 63M w PMHx HTN, remote history of alcohol use disorder, and cirrhosis, esophageal varices, and recurrent ascites requiring frequent paracenteses, He had 11.7L removed on 2/8, and then he had 10.7 L abdominal fluid drained 2/21 (then complicated by hypotension, improved after administration of 4 bags of albumin), presenting with abdominal distension, s/p paracentesis & inpatient TIPS procedure in MICU for monitoring.     NEURO:  AOx3 per baseline. Patient has no known seizure history. On exam, no asterixes or jaundice. No clinical signs of hepatic encephalopathy currently.  -Continue to monitor with neuro checks.   -Rifaximin 550 mg BID, lactulose 15 mg q8    CARDS  JEAN PAUL. TTE on 2/26/24: no acute findings, normal left and right ventricular size and systolic function. No significant valvular disease, no pericardial effusion. EF 55-60%  -Continue to monitor vitals closely to prevent from cardio/pulmonary vascular overload    GI:  #Decompensated hepatic cirrhosis #Ascites  Cirrhosis presumed secondary to past alcohol use, with esophageal varices & recurrent ascites requiring paracentesis (of approximately 22.5L within the last month). 11.7 L on 2/8 and 10.7 L on 2/21. November 2023 lab work notable for AFP 2.2, KARYN 1:320, SMA 1:20, HAV IgM, HBsAg, HBcAb IgM, HCV Ab - all negative. 2/27 1x dose of ctx 1g for sbp ppx pre-procedure. 7.2 L of fluid removed in paracentesis on 2/28/24 with TIPS procedure. Peritoneal fluid analysis +SBP with >250 PMNs. SAAG > 1.1 c/w cirrhosis. Patient remains HDS s/p procedure, with softer BPs than baseline, though MAP remains >60. Per hepatology recommendations today:  - Continue ceftriaxone 2g IV q24 for 7 days total to treat for SBP  - Follow-up ascites fluid culture susceptibilities   - Hold diuretics for now given SBP   - Give albumin 1.5 g/kg today, followed by 1 g/kg on day 3 (not exceeding 100g)   - Continue lactulose 15 g PO BID and rifaximin 550 mg PO BID   - Can increase lactulose and titrate to mental status if signs of hepatic encephalopathy observed   - Monitor for signs of liver failure or hepatic encephalopathy s/p TIPS   - Continue pantoprazole 40 mg PO daily   - Trend CBC, CMP, and INR daily   - F/u PETH which is pending     ENDO:  JEAN PAUL. Glucose range 120s-180s.    RENAL  JEAN PAUL. Creatinine 0.69, non-oliguric urinary output.     HEME  Hg 10-12 during hospital course. Plts 200s, stable.   -q4 CBC to trend s/p procedure. Maintain active T&S. Transfuse Hg <7.    PPX  F: None  E: Replete K>4, Phos >3 Mg>2  N: DASH diet  DVT PPx: Lovenox 40mg sq---restart DVT PPX pending GI, IR recommendations  GI PPx: Omeprazole 20mg qd (home rx)  Dispo: F  Code: FULL CODE.

## 2024-02-29 NOTE — PROGRESS NOTE ADULT - SUBJECTIVE AND OBJECTIVE BOX
62yo M w/ Hx HTN, remote history of alcohol use disorder, decompensated cirrhosis, esophageal varices, and recurrent ascites requiring frequent paracenteses, presenting with abdominal distension, admitted for paracentesis & inpatient TIPS procedure. S/p CT abd triple phase and echo. S/p TIPS and large volume paracentesis (given albumin) by IR on 2/28/24. Initial results of peritoneal fluid analysis c/f SBP. Fluid cx NGTD.     This AM pt is AF. HR wnl. BP 90s-100s/50s-70s. No leukocytosis. H/H 10.3/32.4 (11.7/36.4 yd).  Tbili and indirect bili both mildly increased today, 2.2 and 1.7, respectively, possibly 2/2 hemolysis related to TIPS. INR increased to 1.5 from 1.2 yd.     On exam pt alert and oriented to name, date, and location (presumed baseline comparing to prior chart notes). Reports 5/10 lower chest/upper abd pain, well-controlled with medication. No caterina encephalopathy. Neck dressing c/d/i. Abd w/o TTP.     Cont to monitor liver fxn and for signs of hepatic encephalopathy.

## 2024-02-29 NOTE — DIETITIAN INITIAL EVALUATION ADULT - OTHER CALCULATIONS
Based on Standards of Care pt within % IBW (111%) thus actual body weight used for all calculations. Needs adjusted for cirrhosis and malnutrition. Fluid recs per team.

## 2024-02-29 NOTE — PROGRESS NOTE ADULT - SUBJECTIVE AND OBJECTIVE BOX
Hepatology Consult Progress Note:     OVERNIGHT EVENTS: QUINCY.    SUBJECTIVE / INTERVAL HPI:   Patient seen and examined at bedside s/p para and TIPS. Had 7.2L removed with IR and was given 150 cc 25% albumin. Ascites fluid positive for SBP, so started on ceftriaxone 2g IV q24.       VITAL SIGNS:  Vital Signs Last 24 Hrs  T(C): 36.1 (29 Feb 2024 05:12), Max: 36.6 (28 Feb 2024 08:48)  T(F): 96.9 (29 Feb 2024 05:12), Max: 97.8 (28 Feb 2024 08:48)  HR: 61 (29 Feb 2024 08:00) (59 - 88)  BP: 96/55 (29 Feb 2024 08:00) (93/57 - 128/86)  BP(mean): 70 (29 Feb 2024 08:00) (66 - 93)  RR: 18 (29 Feb 2024 08:00) (12 - 19)  SpO2: 99% (29 Feb 2024 08:00) (98% - 100%)    Parameters below as of 29 Feb 2024 08:00  Patient On (Oxygen Delivery Method): room air        02-28-24 @ 07:01  -  02-29-24 @ 07:00  --------------------------------------------------------  IN: 150 mL / OUT: 400 mL / NET: -250 mL      PHYSICAL EXAM:  General: No acute distress  Lungs: Normal respiratory effort and no intercostal retractions  Cardiovascular: RRR  Abdomen: Soft, non-tender, less distended   Neurological: Alert and oriented x3  Skin: Warm and dry. No obvious rash      MEDICATIONS:  MEDICATIONS  (STANDING):  cefTRIAXone   IVPB 2000 milliGRAM(s) IV Intermittent every 24 hours  chlorhexidine 2% Cloths 1 Application(s) Topical <User Schedule>  furosemide    Tablet 20 milliGRAM(s) Oral two times a day  influenza   Vaccine 0.5 milliLiter(s) IntraMuscular once  lactulose Syrup 15 Gram(s) Oral once  lactulose Syrup 15 Gram(s) Oral every 8 hours  pantoprazole    Tablet 40 milliGRAM(s) Oral before breakfast  rifAXIMin 550 milliGRAM(s) Oral two times a day  spironolactone 50 milliGRAM(s) Oral daily    MEDICATIONS  (PRN):      ALLERGIES:  Allergies    ceftriaxone (Anaphylaxis (Severe))    Intolerances        LABS:                        8.6    5.49  )-----------( 118      ( 29 Feb 2024 03:35 )             26.2     02-29    129<L>  |  98  |  13  ----------------------------<  106<H>  4.5   |  24  |  0.71    Ca    8.9      29 Feb 2024 03:35  Phos  3.7     02-29  Mg     2.2     02-29    TPro  6.0  /  Alb  3.7  /  TBili  2.0<H>  /  DBili  x   /  AST  50<H>  /  ALT  33  /  AlkPhos  203<H>  02-29    PT/INR - ( 29 Feb 2024 03:35 )   PT: 16.9 sec;   INR: 1.50          PTT - ( 29 Feb 2024 03:35 )  PTT:37.7 sec  Urinalysis Basic - ( 29 Feb 2024 03:35 )    Color: x / Appearance: x / SG: x / pH: x  Gluc: 106 mg/dL / Ketone: x  / Bili: x / Urobili: x   Blood: x / Protein: x / Nitrite: x   Leuk Esterase: x / RBC: x / WBC x   Sq Epi: x / Non Sq Epi: x / Bacteria: x      CAPILLARY BLOOD GLUCOSE          Culture - Body Fluid with Gram Stain (collected 02-28-24 @ 14:30)  Source: Peritoneal peritoneal fluid  Gram Stain (02-28-24 @ 21:13):    No organisms seen    Rare WBC's  Preliminary Report (02-29-24 @ 08:29):    No growth to date    RADIOLOGY & ADDITIONAL TESTS: Reviewed.     Hepatology Consult Progress Note:     OVERNIGHT EVENTS: QUINCY.    SUBJECTIVE / INTERVAL HPI:   Patient seen and examined at bedside s/p para and TIPS. Had 7.2L removed with IR and was given 150 cc 25% albumin. Ascites fluid positive for SBP, so started on ceftriaxone 2g IV q24.       VITAL SIGNS:  Vital Signs Last 24 Hrs  T(C): 36.1 (29 Feb 2024 05:12), Max: 36.6 (28 Feb 2024 08:48)  T(F): 96.9 (29 Feb 2024 05:12), Max: 97.8 (28 Feb 2024 08:48)  HR: 61 (29 Feb 2024 08:00) (59 - 88)  BP: 96/55 (29 Feb 2024 08:00) (93/57 - 128/86)  BP(mean): 70 (29 Feb 2024 08:00) (66 - 93)  RR: 18 (29 Feb 2024 08:00) (12 - 19)  SpO2: 99% (29 Feb 2024 08:00) (98% - 100%)    Parameters below as of 29 Feb 2024 08:00  Patient On (Oxygen Delivery Method): room air        02-28-24 @ 07:01  -  02-29-24 @ 07:00  --------------------------------------------------------  IN: 150 mL / OUT: 400 mL / NET: -250 mL      PHYSICAL EXAM:  General: No acute distress, bandage c/d/i on neck from TIPS access   Lungs: Normal respiratory effort and no intercostal retractions  Cardiovascular: RRR  Abdomen: Soft, non-tender, less distended compared to yesterday   Neurological: Alert and oriented x3, no asterixis on exam   Skin: Warm and dry. No obvious rash      MEDICATIONS:  MEDICATIONS  (STANDING):  cefTRIAXone   IVPB 2000 milliGRAM(s) IV Intermittent every 24 hours  chlorhexidine 2% Cloths 1 Application(s) Topical <User Schedule>  furosemide    Tablet 20 milliGRAM(s) Oral two times a day  influenza   Vaccine 0.5 milliLiter(s) IntraMuscular once  lactulose Syrup 15 Gram(s) Oral once  lactulose Syrup 15 Gram(s) Oral every 8 hours  pantoprazole    Tablet 40 milliGRAM(s) Oral before breakfast  rifAXIMin 550 milliGRAM(s) Oral two times a day  spironolactone 50 milliGRAM(s) Oral daily    MEDICATIONS  (PRN):      ALLERGIES:  Allergies    ceftriaxone (Anaphylaxis (Severe))    Intolerances        LABS:                        8.6    5.49  )-----------( 118      ( 29 Feb 2024 03:35 )             26.2     02-29    129<L>  |  98  |  13  ----------------------------<  106<H>  4.5   |  24  |  0.71    Ca    8.9      29 Feb 2024 03:35  Phos  3.7     02-29  Mg     2.2     02-29    TPro  6.0  /  Alb  3.7  /  TBili  2.0<H>  /  DBili  x   /  AST  50<H>  /  ALT  33  /  AlkPhos  203<H>  02-29    PT/INR - ( 29 Feb 2024 03:35 )   PT: 16.9 sec;   INR: 1.50          PTT - ( 29 Feb 2024 03:35 )  PTT:37.7 sec  Urinalysis Basic - ( 29 Feb 2024 03:35 )    Color: x / Appearance: x / SG: x / pH: x  Gluc: 106 mg/dL / Ketone: x  / Bili: x / Urobili: x   Blood: x / Protein: x / Nitrite: x   Leuk Esterase: x / RBC: x / WBC x   Sq Epi: x / Non Sq Epi: x / Bacteria: x      CAPILLARY BLOOD GLUCOSE          Culture - Body Fluid with Gram Stain (collected 02-28-24 @ 14:30)  Source: Peritoneal peritoneal fluid  Gram Stain (02-28-24 @ 21:13):    No organisms seen    Rare WBC's  Preliminary Report (02-29-24 @ 08:29):    No growth to date    RADIOLOGY & ADDITIONAL TESTS: Reviewed.

## 2024-02-29 NOTE — DIETITIAN NUTRITION RISK NOTIFICATION - ADDITIONAL COMMENTS/DIETITIAN RECOMMENDATIONS
Continue with DASH/TLC diet. Provided nutrition education. Consider adding Ensure Max Protein 1x/day (150 kcal, 30 g protein per serving) if PO intake does not improve.

## 2024-02-29 NOTE — DIETITIAN INITIAL EVALUATION ADULT - ADD RECOMMEND
1. Continue with DASH/TLC diet. Provided nutrition education. Consider adding Ensure Max Protein 1x/day (150 kcal, 30 g protein per serving) if PO intake does not improve.  2. Encourage pt to meet nutritional needs as able   3. Monitor labs: electrolytes, cmp  4. Monitor weights   5. Pain and bowel regimen per team   6. Will continue to assess/honor food preferences as able  7. Monitor adherence to diet education

## 2024-02-29 NOTE — DIETITIAN INITIAL EVALUATION ADULT - PERTINENT LABORATORY DATA
02-29    128<L>  |  96  |  14  ----------------------------<  136<H>  4.7   |  23  |  0.69    Ca    9.3      29 Feb 2024 09:08  Phos  3.7     02-29  Mg     2.2     02-29    TPro  6.7  /  Alb  4.2  /  TBili  2.2<H>  /  DBili  x   /  AST  58<H>  /  ALT  38  /  AlkPhos  233<H>  02-29

## 2024-02-29 NOTE — DIETITIAN INITIAL EVALUATION ADULT - PROBLEM SELECTOR PLAN 1
home medication(s): sprinolactone 50mg qd, lasix 40mg qd  pt sent to ed for inpatient tips procedure by his gastroenterologist dr dos santos  cirrhosis presumed secondary to past alcohol use, with esophageal varices & recurrent ascites requiring paracentesis (of approximately 22.5L within the last month)  november 2023 labwork notable for AFP 2.2, KARYN 1:320, SMA 1:20,  HAV IgM, HBsAg, HBcAb IgM, HCV Ab - all negative  gi following, recommendations appreciated  - per GI, follow up labwork:             PETH,                                                      HAV IgG,                                                      HBV surface Ab,                                                      HBV core total Ab,                                                      daily MELD labs                                    imaging:             MRI liver w/wo contrast vs CT A/P w/wo contrast triple phase                                                      TTE  - plan for diagnostic/therapeutic paracentesis, will send cell count, cultures, albumin, protein,                    rec administration of 8g of 25% albumin per additional liter removed after initial 5L  - IR consult in am  - formal GI consult in am  - continue home medication(s)

## 2024-02-29 NOTE — DIETITIAN INITIAL EVALUATION ADULT - OTHER INFO
63M w PMHx HTN, remote history of alcohol use disorder, and cirrhosis, esophageal varices, and recurrent ascites requiring frequent paracenteses, presenting with abdominal distension. He was [facility] to [facility] with similar complaint, where he had 10.7L abdominal fluid drained 2/21 (then complicated by hypotension, improved after administration of 4 bags of albumin). He previously had 11.7L removed on 2/8. Pt states that he recently saw Dr. Vizcarra at his outpatient hepatology clinic, who advised him to present to ED if he develops recurrent ascites, as pt will likely require TIPS procedure but is unable to do undergo said procedure in outpatient setting due to social circumstances.    Patient seen at bedside for nutrition assessment. Current diet order: DASH/TLC. No known food allergies.  used ID# [id number]. No difficulty chewing/swallowing reported. Reports poor appetite since TIPS procedure yesterday due to stomach ache. Observed breakfast tray untouched. Reports he had been eating less PTA due to ascites and abdominal discomfort. Provided encouragement for PO intake and provided nutrition education discussing importance of adequate protein, food sources of protein. Dosing weight: 144 pounds, BMI 28.1, endorses weight loss from eating less however unable to quantify due to fluid build up. No pressure injuries documented. No edema documented. Last BM 2/28 per chart. Labs: Na low (128), elevated LFT's. Meds: antibiotics, lactulose, protonix. Observed with severe wasting to clavicles, orbitals, temples, and buccals. Based on ASPEN guidelines, pt meets criteria for severe malnutrition. No cultural, ethnic, Methodist food preferences reported. See nutrition recommendations below.  63M w PMHx HTN, remote history of alcohol use disorder, and cirrhosis, esophageal varices, and recurrent ascites requiring frequent paracenteses, presenting with abdominal distension. He was admitted to [facility] with similar complaint, where he had 10.7L abdominal fluid drained 2/21 (then complicated by hypotension, improved after administration of 4 bags of albumin). He previously had 11.7L removed on 2/8. Pt states that he recently saw Dr. Vizcarra at his outpatient hepatology clinic, who advised him to present to ED if he develops recurrent ascites, as pt will likely require TIPS procedure but is unable to do undergo said procedure in outpatient setting due to social circumstances.    Patient seen at bedside for nutrition assessment. Current diet order: DASH/TLC. No known food allergies.  used ID# [id number]. No difficulty chewing/swallowing reported. Reports poor appetite since TIPS procedure yesterday due to stomach ache. Observed breakfast tray untouched. Reports he had been eating less PTA due to ascites and abdominal discomfort. Provided encouragement for PO intake and provided nutrition education discussing importance of adequate protein, food sources of protein. Dosing weight: 144 pounds, dosing height entered as 5'0", however patient is 5'3" per [facility], BMI 25.5. Endorses weight loss from eating less however unable to quantify due to fluid build up. No pressure injuries documented. No edema documented. Last BM 2/28 per chart. Labs: Na low (128), elevated LFT's. Meds: antibiotics, lactulose, protonix. Observed with severe wasting to clavicles, orbitals, temples, and buccals. Based on ASPEN guidelines, pt meets criteria for severe malnutrition. No cultural, ethnic, Gnosticist food preferences reported. See nutrition recommendations below.

## 2024-02-29 NOTE — DIETITIAN INITIAL EVALUATION ADULT - PROBLEM SELECTOR PLAN 4
F: None  E: Replete PRN to K>4, Mg>2  N: DASH diet  DVT PPx: Lovenox 40mg sq  GI PPx: omeprazole 20mg qd (home rx)  Dispo: [facility]  Code: FULL CODE

## 2024-02-29 NOTE — DIETITIAN INITIAL EVALUATION ADULT - PERTINENT MEDS FT
MEDICATIONS  (STANDING):  cefTRIAXone   IVPB 2000 milliGRAM(s) IV Intermittent every 24 hours  chlorhexidine 2% Cloths 1 Application(s) Topical <User Schedule>  influenza   Vaccine 0.5 milliLiter(s) IntraMuscular once  lactulose Syrup 15 Gram(s) Oral every 8 hours  pantoprazole    Tablet 40 milliGRAM(s) Oral before breakfast  rifAXIMin 550 milliGRAM(s) Oral two times a day    MEDICATIONS  (PRN):

## 2024-02-29 NOTE — PROGRESS NOTE ADULT - ASSESSMENT
63M h/o cirrhosis (c/b recurrent ascites), ETOH use disorder (in remission), who p/w ascites. Hepatology consulted for cirrhosis management, and patient is planned for TIPS with IR on Thursday.     Echocardiogram:    1. Normal left and right ventricular size and systolic function.   2. Normal atria.   3. No significant valvular disease.   4. No pericardial effusion.   5. No prior echo is available for comparison.    CT abdomen/pelvis:   - Liver cirrhosis with signs of portal hypertension including splenomegaly, large volume ascites in all 4 quadrants and recanalized umbilical vein as well as moderate caliber esophageal and gastric varices.   - Ascites extending into the left hemiscrotum and a small umbilical hernia.  - No evidence of focal enhancing liver lesion to suspect hepatocellular carcinoma.    Underwent IR-guided paracentesis and TIPS procedure on 02/28. Had 7.2L removed with IR and was given 150 cc 25% albumin.  Ascites fluid positive for SBP, so started on ceftriaxone 2g IV q24. Stepped up to MICU for post-TIPS monitoring.   INR up-trending today to 1.5 and Meld 3.0 score: 21.    Recommendations:  - Continue ceftriaxone 2g IV q24 for 7 days total to treat for SBP  - Follow-up ascites fluid culture susceptibilities   - Continue lactulose 15 g PO BID and rifaximin 550 mg PO BID   - Monitor for signs of liver failure or hepatic encephalopathy s/p TIPS   - Can increase lactulose and titrate to mental status if signs of hepatic encephalopathy observed   - Continue pantoprazole 40 mg PO daily   - Trend CBC, CMP, and INR daily   - F/u PETH which is pending     Case discussed with Dr. James. Hepatology Team will continue to follow.     Rosi Madison D.O.   Gastroenterology Fellow  Weekday 7am-5pm Pager: [phone]  Weeknights/Weekend/Holiday Coverage: Please call the  for contact info   63M h/o cirrhosis (c/b recurrent ascites), ETOH use disorder (in remission), who p/w ascites. Hepatology consulted for cirrhosis management, and patient is planned for TIPS with IR on Thursday.     Echocardiogram:    1. Normal left and right ventricular size and systolic function.   2. Normal atria.   3. No significant valvular disease.   4. No pericardial effusion.   5. No prior echo is available for comparison.    CT abdomen/pelvis:   - Liver cirrhosis with signs of portal hypertension including splenomegaly, large volume ascites in all 4 quadrants and recanalized umbilical vein as well as moderate caliber esophageal and gastric varices.   - Ascites extending into the left hemiscrotum and a small umbilical hernia.  - No evidence of focal enhancing liver lesion to suspect hepatocellular carcinoma.    Underwent IR-guided paracentesis and TIPS procedure on 02/28. Had 7.2L removed with IR and was given 150 cc 25% albumin.  Ascites fluid positive for SBP, so started on ceftriaxone 2g IV q24. Stepped up to MICU for post-TIPS monitoring.   INR up-trending today to 1.5 and Meld 3.0 score: 21.    Recommendations:  - Continue ceftriaxone 2g IV q24 for 7 days total to treat for SBP  - Follow-up ascites fluid culture susceptibilities   - Hold diuretics for now given SBP   - Give albumin 1.5 g/kg today, followed by 1 g/kg on day 3 (not exceeding 100g)   - Continue lactulose 15 g PO BID and rifaximin 550 mg PO BID   - Monitor for signs of liver failure or hepatic encephalopathy s/p TIPS   - Can increase lactulose and titrate to mental status if signs of hepatic encephalopathy observed   - Continue pantoprazole 40 mg PO daily   - Trend CBC, CMP, and INR daily   - F/u PETH which is pending     Case discussed with Dr. James. Hepatology Team will continue to follow.     Rosi Madison D.O.   Gastroenterology Fellow  Weekday 7am-5pm Pager: [phone]  Weeknights/Weekend/Holiday Coverage: Please call the  for contact info   63M h/o cirrhosis (c/b recurrent ascites), ETOH use disorder (in remission), who p/w ascites. Hepatology consulted for cirrhosis management, and patient underwent TIPS and paracentesis this admission. Course complicated by SBP.     Echocardiogram:    1. Normal left and right ventricular size and systolic function.   2. Normal atria.   3. No significant valvular disease.   4. No pericardial effusion.   5. No prior echo is available for comparison.    CT abdomen/pelvis:   - Liver cirrhosis with signs of portal hypertension including splenomegaly, large volume ascites in all 4 quadrants and recanalized umbilical vein as well as moderate caliber esophageal and gastric varices.   - Ascites extending into the left hemiscrotum and a small umbilical hernia.  - No evidence of focal enhancing liver lesion to suspect hepatocellular carcinoma.    Underwent IR-guided paracentesis and TIPS procedure on 02/28. Had 7.2L removed with IR and was given 150 cc 25% albumin.  Ascites fluid positive for SBP, so started on ceftriaxone 2g IV q24. Stepped up to MICU for post-TIPS monitoring.   INR up-trending today to 1.5 and Meld 3.0 score: 21.    Recommendations:  - Continue ceftriaxone 2g IV q24 for 7 days total to treat for SBP  - Follow-up ascites fluid culture susceptibilities   - Hold diuretics for now given SBP   - Give albumin 1.5 g/kg today, followed by 1 g/kg on day 3 (not exceeding 100g)   - Continue lactulose 15 g PO BID and rifaximin 550 mg PO BID   - Monitor for signs of liver failure or hepatic encephalopathy s/p TIPS   - Can increase lactulose and titrate to mental status if signs of hepatic encephalopathy observed   - Continue pantoprazole 40 mg PO daily   - Trend CBC, CMP, and INR daily   - F/u PETH which is pending     Case discussed with Dr. James. Hepatology Team will continue to follow.     Rosi Madison D.O.   Gastroenterology Fellow  Weekday 7am-5pm Pager: [phone]  Weeknights/Weekend/Holiday Coverage: Please call the  for contact info

## 2024-02-29 NOTE — PROGRESS NOTE ADULT - SUBJECTIVE AND OBJECTIVE BOX
OVERNIGHT EVENTS: No bowel movements overnight, tolerated PO diet.     SUBJECTIVE / INTERVAL HPI: Patient seen and examined at bedside. Patient states abdominal pain is anterior and not relieved by morphine overnight. He is passing gas but has not yet had a bowel movement. He is tolerating PO diet without nausea or vomiting He denies chest pain or shortness of breath. He is sitting in chair, out of bed, and with assistance attempts to defecate on commode. Remaining ROS negative     PHYSICAL EXAM:  General:NAD.   HEENT: NC/AT; PERRL, anicteric sclera; MMM  Neck: supple  Cardiovascular: +S1/S2, RRR  Respiratory: CTA B/L; no W/R/R  Gastrointestinal: soft, NT/ND; +BSx4  Extremities: WWP; no edema, clubbing or cyanosis  Vascular: 2+ radial, DP/PT pulses B/L  Neurological: AAOx3; no focal deficits  Psychiatric: pleasant mood and affect  Dermatologic: no appreciable wounds or damage to the skin    VITAL SIGNS:  Vital Signs Last 24 Hrs  T(C): 36.4 (29 Feb 2024 17:06), Max: 36.6 (29 Feb 2024 14:31)  T(F): 97.5 (29 Feb 2024 17:06), Max: 97.8 (29 Feb 2024 14:31)  HR: 64 (29 Feb 2024 17:00) (59 - 88)  BP: 111/64 (29 Feb 2024 17:00) (93/57 - 117/60)  BP(mean): 81 (29 Feb 2024 17:00) (66 - 84)  RR: 16 (29 Feb 2024 16:00) (12 - 19)  SpO2: 100% (29 Feb 2024 17:00) (98% - 100%)  Parameters below as of 29 Feb 2024 16:00  Patient On (Oxygen Delivery Method): room air    I&O's Summary    28 Feb 2024 07:01  -  29 Feb 2024 07:00  --------------------------------------------------------  IN: 150 mL / OUT: 400 mL / NET: -250 mL    MEDICATIONS:  MEDICATIONS  (STANDING):  albumin human 25% IVPB 200 milliLiter(s) IV Intermittent every 2 hours  cefTRIAXone   IVPB 2000 milliGRAM(s) IV Intermittent every 24 hours  chlorhexidine 2% Cloths 1 Application(s) Topical <User Schedule>  influenza   Vaccine 0.5 milliLiter(s) IntraMuscular once  lactulose Syrup 15 Gram(s) Oral every 4 hours  pantoprazole    Tablet 40 milliGRAM(s) Oral before breakfast  rifAXIMin 550 milliGRAM(s) Oral two times a day  MEDICATIONS  (PRN):    ALLERGIES:  Allergies  No Known Allergies  Intolerances    LABS:                        10.5   7.65  )-----------( 184      ( 29 Feb 2024 13:58 )             33.3     02-29    131<L>  |  99  |  14  ----------------------------<  123<H>  4.2   |  22  |  0.69    Ca    9.5      29 Feb 2024 13:58  Phos  3.2     02-29  Mg     2.4     02-29  TPro  6.9  /  Alb  4.4  /  TBili  1.7<H>  /  DBili  x   /  AST  73<H>  /  ALT  46<H>  /  AlkPhos  270<H>  02-29  PT/INR - ( 29 Feb 2024 13:58 )   PT: 16.1 sec;   INR: 1.43     PTT - ( 29 Feb 2024 03:35 )  PTT:37.7 sec  Urinalysis Basic - ( 29 Feb 2024 13:58 )  Color: x / Appearance: x / SG: x / pH: x  Gluc: 123 mg/dL / Ketone: x  / Bili: x / Urobili: x   Blood: x / Protein: x / Nitrite: x   Leuk Esterase: x / RBC: x / WBC x   Sq Epi: x / Non Sq Epi: x / Bacteria: x  CAPILLARY BLOOD GLUCOSE  RADIOLOGY & ADDITIONAL TESTS: Reviewed.    CT Abdomen/Pelvis 2/26/24:  - Liver cirrhosis with signs of portal hypertension including splenomegaly, large volume ascites in all 4 quadrants and recanalized umbilical vein as well as moderate caliber esophageal and gastric varices.   - Ascites extending into the left hemiscrotum and a small umbilical hernia.  - No evidence of focal enhancing liver lesion to suspect hepatocellular carcinoma.  Echo:    TTE 2/26/24:  -The left ventricle is normal in size, wall thickness, and systolic   function with a visually estimated ejection fraction of 55-60%. There are   no regional wall motion abnormalities seen.   -No acute findings, normal left and right ventricular size and systolic function. No significant valvular disease, no pericardial effusion.  OVERNIGHT EVENTS: No bowel movements overnight, increased lactulose 15 mg q8. Tolerated PO diet.     SUBJECTIVE / INTERVAL HPI: Patient seen and examined at bedside. Patient states abdominal pain is anterior and not relieved by morphine overnight. He is passing gas but has not yet had a bowel movement. He is tolerating PO diet without nausea or vomiting He denies chest pain or shortness of breath. He is sitting in chair, out of bed, and with assistance attempts to defecate on commode. Remaining ROS negative     PHYSICAL EXAM:  General: no acute distress, no jaundice  HEENT: NC/AT; PERRL, anicteric sclera; MMM  Neck: supple  Cardiovascular: +S1/S2, RRR, +JVP  Respiratory: CTA B/L  Gastrointestinal: abdominal distension  Extremities: WWP; no edema  Vascular: 2+ radial, DP/PT pulses B/L  Neurological: AAOx3; no focal deficits; no asterxis  Psychiatric: pleasant mood and affect  Dermatologic: no appreciable wounds or damage to the skin    VITAL SIGNS:  Vital Signs Last 24 Hrs  T(C): 36.4 (29 Feb 2024 17:06), Max: 36.6 (29 Feb 2024 14:31)  T(F): 97.5 (29 Feb 2024 17:06), Max: 97.8 (29 Feb 2024 14:31)  HR: 64 (29 Feb 2024 17:00) (59 - 88)  BP: 111/64 (29 Feb 2024 17:00) (93/57 - 117/60)  BP(mean): 81 (29 Feb 2024 17:00) (66 - 84)  RR: 16 (29 Feb 2024 16:00) (12 - 19)  SpO2: 100% (29 Feb 2024 17:00) (98% - 100%)  Parameters below as of 29 Feb 2024 16:00  Patient On (Oxygen Delivery Method): room air    I&O's Summary    28 Feb 2024 07:01  -  29 Feb 2024 07:00  --------------------------------------------------------  IN: 150 mL / OUT: 400 mL / NET: -250 mL    MEDICATIONS:  MEDICATIONS  (STANDING):  albumin human 25% IVPB 200 milliLiter(s) IV Intermittent every 2 hours  cefTRIAXone   IVPB 2000 milliGRAM(s) IV Intermittent every 24 hours  chlorhexidine 2% Cloths 1 Application(s) Topical <User Schedule>  influenza   Vaccine 0.5 milliLiter(s) IntraMuscular once  lactulose Syrup 15 Gram(s) Oral every 4 hours  pantoprazole    Tablet 40 milliGRAM(s) Oral before breakfast  rifAXIMin 550 milliGRAM(s) Oral two times a day  MEDICATIONS  (PRN):    ALLERGIES:  Allergies  No Known Allergies  Intolerances    LABS:                        10.5   7.65  )-----------( 184      ( 29 Feb 2024 13:58 )             33.3     02-29    131<L>  |  99  |  14  ----------------------------<  123<H>  4.2   |  22  |  0.69    Ca    9.5      29 Feb 2024 13:58  Phos  3.2     02-29  Mg     2.4     02-29  TPro  6.9  /  Alb  4.4  /  TBili  1.7<H>  /  DBili  x   /  AST  73<H>  /  ALT  46<H>  /  AlkPhos  270<H>  02-29  PT/INR - ( 29 Feb 2024 13:58 )   PT: 16.1 sec;   INR: 1.43     PTT - ( 29 Feb 2024 03:35 )  PTT:37.7 sec  Urinalysis Basic - ( 29 Feb 2024 13:58 )  Color: x / Appearance: x / SG: x / pH: x  Gluc: 123 mg/dL / Ketone: x  / Bili: x / Urobili: x   Blood: x / Protein: x / Nitrite: x   Leuk Esterase: x / RBC: x / WBC x   Sq Epi: x / Non Sq Epi: x / Bacteria: x  CAPILLARY BLOOD GLUCOSE  RADIOLOGY & ADDITIONAL TESTS: Reviewed.    CT Abdomen/Pelvis 2/26/24:  - Liver cirrhosis with signs of portal hypertension including splenomegaly, large volume ascites in all 4 quadrants and recanalized umbilical vein as well as moderate caliber esophageal and gastric varices.   - Ascites extending into the left hemiscrotum and a small umbilical hernia.  - No evidence of focal enhancing liver lesion to suspect hepatocellular carcinoma.  Echo:    TTE 2/26/24:  -The left ventricle is normal in size, wall thickness, and systolic   function with a visually estimated ejection fraction of 55-60%. There are   no regional wall motion abnormalities seen.   -No acute findings, normal left and right ventricular size and systolic function. No significant valvular disease, no pericardial effusion.

## 2024-03-01 DIAGNOSIS — K65.2 SPONTANEOUS BACTERIAL PERITONITIS: ICD-10-CM

## 2024-03-01 LAB
ALBUMIN SERPL ELPH-MCNC: 4.4 G/DL — SIGNIFICANT CHANGE UP (ref 3.3–5)
ALP SERPL-CCNC: 284 U/L — HIGH (ref 40–120)
ALT FLD-CCNC: 64 U/L — HIGH (ref 10–45)
ANION GAP SERPL CALC-SCNC: 9 MMOL/L — SIGNIFICANT CHANGE UP (ref 5–17)
APTT BLD: 38.5 SEC — HIGH (ref 24.5–35.6)
AST SERPL-CCNC: 119 U/L — HIGH (ref 10–40)
BASOPHILS # BLD AUTO: 0.04 K/UL — SIGNIFICANT CHANGE UP (ref 0–0.2)
BASOPHILS NFR BLD AUTO: 0.6 % — SIGNIFICANT CHANGE UP (ref 0–2)
BILIRUB SERPL-MCNC: 1.3 MG/DL — HIGH (ref 0.2–1.2)
BILIRUB SERPL-MCNC: 1.6 MG/DL — HIGH (ref 0.2–1.2)
BLD GP AB SCN SERPL QL: NEGATIVE — SIGNIFICANT CHANGE UP
BUN SERPL-MCNC: 10 MG/DL — SIGNIFICANT CHANGE UP (ref 7–23)
CALCIUM SERPL-MCNC: 9.3 MG/DL — SIGNIFICANT CHANGE UP (ref 8.4–10.5)
CHLORIDE SERPL-SCNC: 99 MMOL/L — SIGNIFICANT CHANGE UP (ref 96–108)
CO2 SERPL-SCNC: 24 MMOL/L — SIGNIFICANT CHANGE UP (ref 22–31)
CREAT SERPL-MCNC: 0.56 MG/DL — SIGNIFICANT CHANGE UP (ref 0.5–1.3)
CREAT SERPL-MCNC: 0.63 MG/DL — SIGNIFICANT CHANGE UP (ref 0.5–1.3)
EGFR: 108 ML/MIN/1.73M2 — SIGNIFICANT CHANGE UP
EGFR: 112 ML/MIN/1.73M2 — SIGNIFICANT CHANGE UP
EOSINOPHIL # BLD AUTO: 0.09 K/UL — SIGNIFICANT CHANGE UP (ref 0–0.5)
EOSINOPHIL NFR BLD AUTO: 1.3 % — SIGNIFICANT CHANGE UP (ref 0–6)
GLUCOSE SERPL-MCNC: 93 MG/DL — SIGNIFICANT CHANGE UP (ref 70–99)
HCT VFR BLD CALC: 34.8 % — LOW (ref 39–50)
HGB BLD-MCNC: 11 G/DL — LOW (ref 13–17)
IMM GRANULOCYTES NFR BLD AUTO: 0.3 % — SIGNIFICANT CHANGE UP (ref 0–0.9)
INR BLD: 1.41 — HIGH (ref 0.85–1.18)
INR BLD: 1.53 — HIGH (ref 0.85–1.18)
LYMPHOCYTES # BLD AUTO: 1 K/UL — SIGNIFICANT CHANGE UP (ref 1–3.3)
LYMPHOCYTES # BLD AUTO: 14.5 % — SIGNIFICANT CHANGE UP (ref 13–44)
MAGNESIUM SERPL-MCNC: 2.2 MG/DL — SIGNIFICANT CHANGE UP (ref 1.6–2.6)
MCHC RBC-ENTMCNC: 25.5 PG — LOW (ref 27–34)
MCHC RBC-ENTMCNC: 31.6 GM/DL — LOW (ref 32–36)
MCV RBC AUTO: 80.6 FL — SIGNIFICANT CHANGE UP (ref 80–100)
MELD SCORE WITH DIALYSIS: 28 POINTS — SIGNIFICANT CHANGE UP
MELD SCORE WITHOUT DIALYSIS: 18 POINTS — SIGNIFICANT CHANGE UP
MONOCYTES # BLD AUTO: 0.62 K/UL — SIGNIFICANT CHANGE UP (ref 0–0.9)
MONOCYTES NFR BLD AUTO: 9 % — SIGNIFICANT CHANGE UP (ref 2–14)
NEUTROPHILS # BLD AUTO: 5.15 K/UL — SIGNIFICANT CHANGE UP (ref 1.8–7.4)
NEUTROPHILS NFR BLD AUTO: 74.3 % — SIGNIFICANT CHANGE UP (ref 43–77)
NRBC # BLD: 0 /100 WBCS — SIGNIFICANT CHANGE UP (ref 0–0)
PETH 16:0/18:1: NEGATIVE NG/ML — SIGNIFICANT CHANGE UP
PETH 16:0/18:2: NEGATIVE NG/ML — SIGNIFICANT CHANGE UP
PETH COMMENTS: SIGNIFICANT CHANGE UP
PHOSPHATE SERPL-MCNC: 2.5 MG/DL — SIGNIFICANT CHANGE UP (ref 2.5–4.5)
PLATELET # BLD AUTO: 157 K/UL — SIGNIFICANT CHANGE UP (ref 150–400)
POTASSIUM SERPL-MCNC: 4.3 MMOL/L — SIGNIFICANT CHANGE UP (ref 3.5–5.3)
POTASSIUM SERPL-SCNC: 4.3 MMOL/L — SIGNIFICANT CHANGE UP (ref 3.5–5.3)
PROT SERPL-MCNC: 6.8 G/DL — SIGNIFICANT CHANGE UP (ref 6–8.3)
PROTHROM AB SERPL-ACNC: 15.9 SEC — HIGH (ref 9.5–13)
PROTHROM AB SERPL-ACNC: 17.2 SEC — HIGH (ref 9.5–13)
RBC # BLD: 4.32 M/UL — SIGNIFICANT CHANGE UP (ref 4.2–5.8)
RBC # FLD: 19.4 % — HIGH (ref 10.3–14.5)
RH IG SCN BLD-IMP: POSITIVE — SIGNIFICANT CHANGE UP
SODIUM SERPL-SCNC: 130 MMOL/L — LOW (ref 135–145)
SODIUM SERPL-SCNC: 132 MMOL/L — LOW (ref 135–145)
WBC # BLD: 6.92 K/UL — SIGNIFICANT CHANGE UP (ref 3.8–10.5)
WBC # FLD AUTO: 6.92 K/UL — SIGNIFICANT CHANGE UP (ref 3.8–10.5)

## 2024-03-01 PROCEDURE — 99232 SBSQ HOSP IP/OBS MODERATE 35: CPT | Mod: GC

## 2024-03-01 PROCEDURE — 99233 SBSQ HOSP IP/OBS HIGH 50: CPT | Mod: GC

## 2024-03-01 RX ORDER — ENOXAPARIN SODIUM 100 MG/ML
40 INJECTION SUBCUTANEOUS EVERY 24 HOURS
Refills: 0 | Status: DISCONTINUED | OUTPATIENT
Start: 2024-03-01 | End: 2024-03-05

## 2024-03-01 RX ORDER — ALBUMIN HUMAN 25 %
250 VIAL (ML) INTRAVENOUS ONCE
Refills: 0 | Status: COMPLETED | OUTPATIENT
Start: 2024-03-01 | End: 2024-03-01

## 2024-03-01 RX ORDER — ALBUMIN HUMAN 25 %
250 VIAL (ML) INTRAVENOUS ONCE
Refills: 0 | Status: DISCONTINUED | OUTPATIENT
Start: 2024-03-01 | End: 2024-03-01

## 2024-03-01 RX ADMIN — LACTULOSE 15 GRAM(S): 10 SOLUTION ORAL at 13:08

## 2024-03-01 RX ADMIN — CHLORHEXIDINE GLUCONATE 1 APPLICATION(S): 213 SOLUTION TOPICAL at 06:54

## 2024-03-01 RX ADMIN — LACTULOSE 15 GRAM(S): 10 SOLUTION ORAL at 21:26

## 2024-03-01 RX ADMIN — CEFTRIAXONE 100 MILLIGRAM(S): 500 INJECTION, POWDER, FOR SOLUTION INTRAMUSCULAR; INTRAVENOUS at 21:26

## 2024-03-01 RX ADMIN — LACTULOSE 15 GRAM(S): 10 SOLUTION ORAL at 06:57

## 2024-03-01 RX ADMIN — ENOXAPARIN SODIUM 40 MILLIGRAM(S): 100 INJECTION SUBCUTANEOUS at 09:30

## 2024-03-01 RX ADMIN — Medication 125 MILLILITER(S): at 13:07

## 2024-03-01 RX ADMIN — PANTOPRAZOLE SODIUM 40 MILLIGRAM(S): 20 TABLET, DELAYED RELEASE ORAL at 06:58

## 2024-03-01 NOTE — PROGRESS NOTE ADULT - ASSESSMENT
63M w PMHx HTN, remote history of alcohol use disorder, and cirrhosis, esophageal varices, and recurrent ascites requiring frequent paracenteses, He had 11.7L removed on 2/8, and then he had 10.7 L abdominal fluid drained 2/21 (then complicated by hypotension, improved after administration of 4 bags of albumin), presenting with abdominal distension, s/p paracentesis & inpatient TIPS procedure in MICU for monitoring.     NEURO:  AOx3 per baseline. Patient has no known seizure history. On exam, no asterixes or jaundice. No clinical signs of hepatic encephalopathy currently.  -Continue to monitor with neuro checks.   -Rifaximin 550 mg BID, lactulose 15 mg q8    CARDS  JEAN PAUL. TTE on 2/26/24: no acute findings, normal left and right ventricular size and systolic function. No significant valvular disease, no pericardial effusion. EF 55-60%  -Continue to monitor vitals closely to prevent from cardio/pulmonary vascular overload    GI:  #Decompensated hepatic cirrhosis #Ascites  Cirrhosis presumed secondary to past alcohol use, with esophageal varices & recurrent ascites requiring paracentesis (of approximately 22.5L within the last month). 11.7 L on 2/8 and 10.7 L on 2/21. November 2023 lab work notable for AFP 2.2, KARYN 1:320, SMA 1:20, HAV IgM, HBsAg, HBcAb IgM, HCV Ab - all negative. 2/27 1x dose of ctx 1g for sbp ppx pre-procedure. 7.2 L of fluid removed in paracentesis on 2/28/24 with TIPS procedure. Peritoneal fluid analysis +SBP with >250 PMNs. SAAG > 1.1 c/w cirrhosis. Patient remains HDS s/p procedure, with softer BPs than baseline, though MAP remains >60. Per hepatology recommendations today:  - Continue ceftriaxone 2g IV q24 for 7 days total to treat for SBP  - Follow-up ascites fluid culture susceptibilities   - Hold diuretics for now given SBP   - Give albumin 1.5 g/kg today, followed by 1 g/kg on day 3 (not exceeding 100g)   - Continue lactulose 15 g PO BID and rifaximin 550 mg PO BID   - Can increase lactulose and titrate to mental status if signs of hepatic encephalopathy observed   - Monitor for signs of liver failure or hepatic encephalopathy s/p TIPS   - Continue pantoprazole 40 mg PO daily   - Trend CBC, CMP, and INR daily   - F/u PETH which is pending     ENDO:  JEAN PAUL. Glucose range 120s-180s.    RENAL  JEAN PAUL. Creatinine 0.69, non-oliguric urinary output.     HEME  Hg 10-12 during hospital course. Plts 200s, stable.   -q4 CBC to trend s/p procedure. Maintain active T&S. Transfuse Hg <7.    PPX  F: None  E: Replete K>4, Phos >3 Mg>2  N: DASH diet  DVT PPx: Lovenox 40mg sq---restart DVT PPX pending GI, IR recommendations  GI PPx: Omeprazole 20mg qd (home rx)  Dispo: F  Code: FULL CODE.   63M w PMHx HTN, remote history of alcohol use disorder, and cirrhosis, esophageal varices, and recurrent ascites requiring frequent paracenteses, He had 11.7L removed on 2/8, and then he had 10.7 L abdominal fluid drained 2/21 (then complicated by hypotension, improved after administration of 4 bags of albumin), presenting with abdominal distension, s/p paracentesis & inpatient TIPS procedure in MICU for monitoring.     NEURO:  AOx3 per baseline. Patient has no known seizure history. On exam, no asterixes or jaundice. No clinical signs of hepatic encephalopathy currently.  -Continue to monitor with neuro checks.   -Rifaximin 550 mg BID, lactulose 15 mg q8    CARDS  JEAN PAUL. TTE on 2/26/24: no acute findings, normal left and right ventricular size and systolic function. No significant valvular disease, no pericardial effusion. EF 55-60%  -Continue to monitor vitals closely to prevent from cardio/pulmonary vascular overload    GI:  #Decompensated hepatic cirrhosis #Ascites  Cirrhosis presumed secondary to past alcohol use, with esophageal varices & recurrent ascites requiring paracentesis (of approximately 22.5L within the last month). 11.7 L on 2/8 and 10.7 L on 2/21. November 2023 lab work notable for AFP 2.2, KARYN 1:320, SMA 1:20, HAV IgM, HBsAg, HBcAb IgM, HCV Ab - all negative. 2/27 1x dose of ctx 1g for SBP ppx pre-procedure. 7.2 L of fluid removed in paracentesis on 2/28/24 with TIPS procedure. Peritoneal fluid analysis +SBP with >250 PMNs. SAAG > 1.1 c/w cirrhosis. Patient remains HDS s/p procedure, with softer BPs than baseline, though MAP remains >60. Per hepatology recommendations today:  - Continue ceftriaxone 2g IV q24 for 7 days total to treat for SBP  - Follow-up ascites fluid culture susceptibilities   - Hold diuretics for now given SBP   - Give albumin 1.5 g/kg today, followed by 1 g/kg on day 3 (not exceeding 100g)   - Continue lactulose 15 g PO BID and rifaximin 550 mg PO BID   - Can increase lactulose and titrate to mental status if signs of hepatic encephalopathy observed   - Monitor for signs of liver failure or hepatic encephalopathy s/p TIPS   - Continue pantoprazole 40 mg PO daily   - Trend CBC, CMP, and INR daily   - F/u PETH which is pending     ENDO:  JEAN PAUL. Glucose range 120s-180s.    RENAL  JEAN PAUL. Creatinine 0.69, non-oliguric urinary output.     HEME  Hg 10-12 during hospital course. Plts 200s, stable.   -q4 CBC to trend s/p procedure. Maintain active T&S. Transfuse Hg <7.    PPX  F: None  E: Replete K>4, Phos >3 Mg>2  N: DASH diet  DVT PPx: Lovenox 40mg sq---restart DVT PPX pending GI, IR recommendations  GI PPx: Omeprazole 20mg qd (home rx)  Dispo: F  Code: FULL CODE. 63M w PMHx HTN, remote history of alcohol use disorder, and cirrhosis, esophageal varices, and recurrent ascites requiring frequent paracenteses, He had 11.7L removed on 2/8, and then he had 10.7 L abdominal fluid drained 2/21 (then complicated by hypotension, improved after administration of 4 bags of albumin), presenting with abdominal distension, s/p paracentesis & inpatient TIPS procedure in MICU for monitoring.     NEURO:  AOx3 per baseline. Patient has no known seizure history. On exam, no asterixes or jaundice. No clinical signs of hepatic encephalopathy currently.  -Continue to monitor with neuro checks  -Rifaximin 550 mg BID, lactulose 15 mg q8    CARDS  JEAN PAUL. TTE on 2/26/24: no acute findings, normal left and right ventricular size and systolic function. No significant valvular disease, no pericardial effusion. EF 55-60%  -Continue to monitor vitals closely to prevent from cardio/pulmonary vascular overload    GI:  #Decompensated hepatic cirrhosis #Ascites  Cirrhosis presumed secondary to past alcohol use, with esophageal varices & recurrent ascites requiring paracentesis (of approximately 22.5L within the last month). 11.7 L on 2/8 and 10.7 L on 2/21. November 2023 lab work notable for AFP 2.2, KARYN 1:320, SMA 1:20, HAV IgM, HBsAg, HBcAb IgM, HCV Ab - all negative. 2/27 1x dose of ctx 1g for SBP ppx pre-procedure. 7.2 L of fluid removed in paracentesis on 2/28/24 with TIPS procedure. Peritoneal fluid analysis +SBP with >250 PMNs. SAAG > 1.1 c/w cirrhosis. Patient remains HDS s/p procedure, with softer BPs than baseline, though MAP remains >60. Per hepatology recommendations today:  - Continue ceftriaxone 2g IV q24 for 7 days total to treat for SBP  - Follow-up ascites fluid culture susceptibilities   - Hold diuretics for now given SBP   - Continuing albumin 1.5 g/kg  - Continue lactulose 15 g PO BID and rifaximin 550 mg PO BID   - Can increase lactulose and titrate to mental status if signs of hepatic encephalopathy observed   - Monitor for signs of liver failure or hepatic encephalopathy s/p TIPS   - Continue pantoprazole 40 mg PO daily   - Trend CBC, CMP, and INR daily   - F/u PETH which is pending     ENDO:  JEAN PAUL. Glucose range 120s-180s.    RENAL  JEAN PAUL.     HEME  Hg 10-12 during hospital course. Plts 200s, stable.   -daily CBC to trend s/p procedure. Maintain active T&S. Transfuse Hg <7.    PPX  F: None  E: Replete K>4, Phos >3 Mg>2  N: DASH diet  DVT PPx: Lovenox 40mg sq---restart DVT PPX pending GI, IR recommendations  GI PPx: Omeprazole 20mg qd (home rx)  Dispo: [facility]  Code: FULL CODE.

## 2024-03-01 NOTE — PROGRESS NOTE ADULT - SUBJECTIVE AND OBJECTIVE BOX
HOSPITAL COURSE:  63M w PMHx HTN, remote history of alcohol use disorder, and cirrhosis w esophageal varices and recurrent ascites requiring frequent paracenteses, presented with abdominal distension advised by his outpatient Gastroenterologist, Dr. Oliva for inpatient TIPS procedure. s/p Paracentesis and TIPS on 2/28 and transferred to MICU for monitoring of complications post procedure. In the MICU, the patient was started on lactulose and Rifaximin. He was HDS and stable for downgrade to F.     SUBJECTIVE:  The patient was seen and examined at the bedside upon transfer to the [facility]. The patient states that his abdominal pain is much improved after morphine. He states that he overall is feeling much better. States that he has been having BM and does not feel disoriented. Denies nausea, vomiting, dizziness, lightheadedness, pain or blood with BM.    VITAL SIGNS:  Vital Signs Last 24 Hrs  T(C): 36.8 (01 Mar 2024 12:35), Max: 36.8 (01 Mar 2024 12:35)  T(F): 98.3 (01 Mar 2024 12:35), Max: 98.3 (01 Mar 2024 12:35)  HR: 72 (01 Mar 2024 12:35) (63 - 75)  BP: 96/61 (01 Mar 2024 12:35) (96/60 - 111/64)  BP(mean): 81 (01 Mar 2024 09:00) (73 - 81)  RR: 18 (01 Mar 2024 12:35) (16 - 18)  SpO2: 99% (01 Mar 2024 12:35) (97% - 100%)    Parameters below as of 01 Mar 2024 12:35  Patient On (Oxygen Delivery Method): room air        PHYSICAL EXAM:  General: NAD; speaking in full sentences, cooperating to exam  HEENT: PERRL; EOMI; MMM, Neck bandage was removed and there is no drainage noted, no erythema and patient denies pain  Neck: supple; no JVD  Cardiac: RRR; +S1/S2, no murmur   Pulm: CTA B/L; no W/R/R  GI: Mildly distended abdomen, non-tender. Noted bandage over area where paracentesis was performed and it is c/d/i  Extremities: WWP; no edema, clubbing or cyanosis  Vasc: 2+ radial, DP pulses B/L  Neuro: AAOx3; no focal deficits    MEDICATIONS:  MEDICATIONS  (STANDING):  cefTRIAXone   IVPB 2000 milliGRAM(s) IV Intermittent every 24 hours  chlorhexidine 2% Cloths 1 Application(s) Topical <User Schedule>  enoxaparin Injectable 40 milliGRAM(s) SubCutaneous every 24 hours  influenza   Vaccine 0.5 milliLiter(s) IntraMuscular once  lactulose Syrup 15 Gram(s) Oral every 8 hours  pantoprazole    Tablet 40 milliGRAM(s) Oral before breakfast  rifAXIMin 550 milliGRAM(s) Oral two times a day    MEDICATIONS  (PRN):      ALLERGIES:  Allergies    No Known Allergies    Intolerances        LABS:                        11.0   6.92  )-----------( 157      ( 01 Mar 2024 07:58 )             34.8     03-01    132<L>  |  99  |  10  ----------------------------<  93  4.3   |  24  |  0.63    Ca    9.3      01 Mar 2024 07:58  Phos  2.5     03-01  Mg     2.2     03-01    TPro  6.8  /  Alb  4.4  /  TBili  1.6<H>  /  DBili  x   /  AST  119<H>  /  ALT  64<H>  /  AlkPhos  284<H>  03-01    PT/INR - ( 01 Mar 2024 07:58 )   PT: 15.9 sec;   INR: 1.41          PTT - ( 01 Mar 2024 07:58 )  PTT:38.5 sec    RADIOLOGY & ADDITIONAL TESTS: Reviewed.      ***** TRANSFER ACCEPTED FROM MICU TO [facility] *****    HOSPITAL COURSE:  63M w PMHx HTN, remote history of alcohol use disorder, and cirrhosis w esophageal varices and recurrent ascites requiring frequent paracenteses, presented with abdominal distension advised by his outpatient Gastroenterologist, Dr. Oliva for inpatient TIPS procedure. s/p Paracentesis with 7.2 L removal and TIPS on 2/28 and transferred to MICU for monitoring of complications post procedure. In the MICU, the patient was started on lactulose, Rifaximin and Ceftriaxone 2g given concern for SBP. He was HDS and stable for downgrade to [facility].     SUBJECTIVE:  The patient was seen and examined at the bedside upon transfer to the [facility]. The patient states that his abdominal pain is much improved after morphine. He states that he overall is feeling much better. States that he has been having BM and does not feel disoriented. Denies nausea, vomiting, dizziness, lightheadedness, pain or blood with BM.    VITAL SIGNS:  Vital Signs Last 24 Hrs  T(C): 36.8 (01 Mar 2024 12:35), Max: 36.8 (01 Mar 2024 12:35)  T(F): 98.3 (01 Mar 2024 12:35), Max: 98.3 (01 Mar 2024 12:35)  HR: 72 (01 Mar 2024 12:35) (63 - 75)  BP: 96/61 (01 Mar 2024 12:35) (96/60 - 111/64)  BP(mean): 81 (01 Mar 2024 09:00) (73 - 81)  RR: 18 (01 Mar 2024 12:35) (16 - 18)  SpO2: 99% (01 Mar 2024 12:35) (97% - 100%)    Parameters below as of 01 Mar 2024 12:35  Patient On (Oxygen Delivery Method): room air        PHYSICAL EXAM:  General: NAD; speaking in full sentences, cooperating to exam  HEENT: PERRL; EOMI; MMM, Neck bandage was removed and there is no drainage noted, no erythema and patient denies pain  Neck: supple; no JVD  Cardiac: RRR; +S1/S2, no murmur   Pulm: CTA B/L; no W/R/R  GI: Mildly distended abdomen, non-tender. Noted bandage over area where paracentesis was performed and it is c/d/i  Extremities: WWP; no edema, clubbing or cyanosis  Vasc: 2+ radial, DP pulses B/L  Neuro: AAOx3; no focal deficits    MEDICATIONS:  MEDICATIONS  (STANDING):  cefTRIAXone   IVPB 2000 milliGRAM(s) IV Intermittent every 24 hours  chlorhexidine 2% Cloths 1 Application(s) Topical <User Schedule>  enoxaparin Injectable 40 milliGRAM(s) SubCutaneous every 24 hours  influenza   Vaccine 0.5 milliLiter(s) IntraMuscular once  lactulose Syrup 15 Gram(s) Oral every 8 hours  pantoprazole    Tablet 40 milliGRAM(s) Oral before breakfast  rifAXIMin 550 milliGRAM(s) Oral two times a day    MEDICATIONS  (PRN):      ALLERGIES:  Allergies    No Known Allergies    Intolerances        LABS:                        11.0   6.92  )-----------( 157      ( 01 Mar 2024 07:58 )             34.8     03-01    132<L>  |  99  |  10  ----------------------------<  93  4.3   |  24  |  0.63    Ca    9.3      01 Mar 2024 07:58  Phos  2.5     03-01  Mg     2.2     03-01    TPro  6.8  /  Alb  4.4  /  TBili  1.6<H>  /  DBili  x   /  AST  119<H>  /  ALT  64<H>  /  AlkPhos  284<H>  03-01    PT/INR - ( 01 Mar 2024 07:58 )   PT: 15.9 sec;   INR: 1.41          PTT - ( 01 Mar 2024 07:58 )  PTT:38.5 sec    RADIOLOGY & ADDITIONAL TESTS: Reviewed.      ***** TRANSFER ACCEPTED FROM MICU TO [facility] *****    HOSPITAL COURSE:  63M w PMHx HTN, remote history of alcohol use disorder, and cirrhosis w esophageal varices and recurrent ascites requiring frequent paracenteses, presented with abdominal distension advised by his outpatient Gastroenterologist, Dr. Oliva for inpatient TIPS procedure. s/p Paracentesis with 7.2 L removal and TIPS on 2/28 and transferred to MICU for monitoring of complications post procedure. In the MICU, the patient was started on lactulose, Rifaximin for ppx of Hepatic encephalopathy and Ceftriaxone 2g given concern for SBP. He was HDS and downgraded to [facility].     SUBJECTIVE:  The patient was seen and examined at the bedside upon transfer to the [facility]. The patient states that his abdominal pain is much improved after morphine. He states that he overall is feeling much better. States that he has been having BM and does not feel disoriented. Denies nausea, vomiting, dizziness, lightheadedness, pain or blood with BM.    VITAL SIGNS:  Vital Signs Last 24 Hrs  T(C): 36.8 (01 Mar 2024 12:35), Max: 36.8 (01 Mar 2024 12:35)  T(F): 98.3 (01 Mar 2024 12:35), Max: 98.3 (01 Mar 2024 12:35)  HR: 72 (01 Mar 2024 12:35) (63 - 75)  BP: 96/61 (01 Mar 2024 12:35) (96/60 - 111/64)  BP(mean): 81 (01 Mar 2024 09:00) (73 - 81)  RR: 18 (01 Mar 2024 12:35) (16 - 18)  SpO2: 99% (01 Mar 2024 12:35) (97% - 100%)    Parameters below as of 01 Mar 2024 12:35  Patient On (Oxygen Delivery Method): room air        PHYSICAL EXAM:  General: NAD; speaking in full sentences, cooperating to exam  HEENT: PERRL; EOMI; MMM, Neck bandage was removed and there is no drainage noted, no erythema and patient denies pain  Neck: supple; no JVD  Cardiac: RRR; +S1/S2, no murmur   Pulm: CTA B/L; no W/R/R  GI: Mildly distended abdomen, non-tender. Noted bandage over area where paracentesis was performed and it is c/d/i  Extremities: WWP; no edema, clubbing or cyanosis  Vasc: 2+ radial, DP pulses B/L  Neuro: AAOx3; no focal deficits    MEDICATIONS:  MEDICATIONS  (STANDING):  cefTRIAXone   IVPB 2000 milliGRAM(s) IV Intermittent every 24 hours  chlorhexidine 2% Cloths 1 Application(s) Topical <User Schedule>  enoxaparin Injectable 40 milliGRAM(s) SubCutaneous every 24 hours  influenza   Vaccine 0.5 milliLiter(s) IntraMuscular once  lactulose Syrup 15 Gram(s) Oral every 8 hours  pantoprazole    Tablet 40 milliGRAM(s) Oral before breakfast  rifAXIMin 550 milliGRAM(s) Oral two times a day    MEDICATIONS  (PRN):      ALLERGIES:  Allergies    No Known Allergies    Intolerances        LABS:                        11.0   6.92  )-----------( 157      ( 01 Mar 2024 07:58 )             34.8     03-01    132<L>  |  99  |  10  ----------------------------<  93  4.3   |  24  |  0.63    Ca    9.3      01 Mar 2024 07:58  Phos  2.5     03-01  Mg     2.2     03-01    TPro  6.8  /  Alb  4.4  /  TBili  1.6<H>  /  DBili  x   /  AST  119<H>  /  ALT  64<H>  /  AlkPhos  284<H>  03-01    PT/INR - ( 01 Mar 2024 07:58 )   PT: 15.9 sec;   INR: 1.41          PTT - ( 01 Mar 2024 07:58 )  PTT:38.5 sec    RADIOLOGY & ADDITIONAL TESTS: Reviewed.

## 2024-03-01 NOTE — PROGRESS NOTE ADULT - ASSESSMENT
63M w PMHx HTN, remote history of alcohol use disorder, and cirrhosis, esophageal varices, and recurrent ascites requiring frequent paracenteses, He had 11.7L removed on 2/8, and then he had 10.7 L abdominal fluid drained 2/21 (then complicated by hypotension, improved after administration of 4 bags of albumin), presenting with abdominal distension, s/p paracentesis & inpatient TIPS procedure in MICU for monitoring.     NEURO:  AOx3 per baseline. Patient has no known seizure history. On exam, no asterixes or jaundice. No clinical signs of hepatic encephalopathy currently.  -Continue to monitor with neuro checks  -Rifaximin 550 mg BID, lactulose 15 mg q8    CARDS  JEAN PAUL. TTE on 2/26/24: no acute findings, normal left and right ventricular size and systolic function. No significant valvular disease, no pericardial effusion. EF 55-60%  -Continue to monitor vitals closely to prevent from cardio/pulmonary vascular overload    GI:  #Decompensated hepatic cirrhosis #Ascites  Cirrhosis presumed secondary to past alcohol use, with esophageal varices & recurrent ascites requiring paracentesis (of approximately 22.5L within the last month). 11.7 L on 2/8 and 10.7 L on 2/21. November 2023 lab work notable for AFP 2.2, KARYN 1:320, SMA 1:20, HAV IgM, HBsAg, HBcAb IgM, HCV Ab - all negative. 2/27 1x dose of ctx 1g for SBP ppx pre-procedure. 7.2 L of fluid removed in paracentesis on 2/28/24 with TIPS procedure. Peritoneal fluid analysis +SBP with >250 PMNs. SAAG > 1.1 c/w cirrhosis. Patient remains HDS s/p procedure, with softer BPs than baseline, though MAP remains >60. Per hepatology recommendations today:  - Continue ceftriaxone 2g IV q24 for 7 days total to treat for SBP  - Follow-up ascites fluid culture susceptibilities   - Hold diuretics for now given SBP   - Continuing albumin 1.5 g/kg  - Continue lactulose 15 g PO BID and rifaximin 550 mg PO BID   - Can increase lactulose and titrate to mental status if signs of hepatic encephalopathy observed   - Monitor for signs of liver failure or hepatic encephalopathy s/p TIPS   - Continue pantoprazole 40 mg PO daily   - Trend CBC, CMP, and INR daily   - F/u PETH which is pending     ENDO:  JEAN PAUL. Glucose range 120s-180s.    RENAL  JEAN PAUL.     HEME  Hg 10-12 during hospital course. Plts 200s, stable.   -daily CBC to trend s/p procedure. Maintain active T&S. Transfuse Hg <7.    PPX  F: None  E: Replete K>4, Phos >3 Mg>2  N: DASH diet  DVT PPx: Lovenox 40mg sq---restart DVT PPX pending GI, IR recommendations  GI PPx: Omeprazole 20mg qd (home rx)  Dispo: [facility]  Code: FULL CODE. 63M w PMHx HTN, remote history of alcohol use disorder, and cirrhosis, esophageal varices, and recurrent ascites requiring frequent paracenteses presenting with abdominal distension, s/p paracentesis with 7. & inpatient TIPS procedure, admitted to MICU for monitoring and now hemodynamically stable for stepdown to [facility]

## 2024-03-01 NOTE — PROGRESS NOTE ADULT - SUBJECTIVE AND OBJECTIVE BOX
GASTROENTEROLOGY PROGRESS NOTE  Patient seen and examined at bedside.  QUINCY. Down graded to [facility]. No confusion or asterixis.    PERTINENT REVIEW OF SYSTEMS:  CONSTITUTIONAL: No weakness, fevers or chills  HEENT: No visual changes; No vertigo or throat pain   GASTROINTESTINAL: As above.  NEUROLOGICAL: No numbness or weakness  SKIN: No itching, burning, rashes, or lesions     Allergies    No Known Allergies    Intolerances      MEDICATIONS:  MEDICATIONS  (STANDING):  cefTRIAXone   IVPB 2000 milliGRAM(s) IV Intermittent every 24 hours  chlorhexidine 2% Cloths 1 Application(s) Topical <User Schedule>  enoxaparin Injectable 40 milliGRAM(s) SubCutaneous every 24 hours  influenza   Vaccine 0.5 milliLiter(s) IntraMuscular once  lactulose Syrup 15 Gram(s) Oral every 8 hours  pantoprazole    Tablet 40 milliGRAM(s) Oral before breakfast  rifAXIMin 550 milliGRAM(s) Oral two times a day    MEDICATIONS  (PRN):    Vital Signs Last 24 Hrs  T(C): 36.8 (01 Mar 2024 12:35), Max: 36.8 (01 Mar 2024 12:35)  T(F): 98.3 (01 Mar 2024 12:35), Max: 98.3 (01 Mar 2024 12:35)  HR: 72 (01 Mar 2024 12:35) (63 - 75)  BP: 96/61 (01 Mar 2024 12:35) (96/60 - 111/64)  BP(mean): 81 (01 Mar 2024 09:00) (73 - 81)  RR: 18 (01 Mar 2024 12:35) (16 - 18)  SpO2: 99% (01 Mar 2024 12:35) (97% - 100%)    Parameters below as of 01 Mar 2024 12:35  Patient On (Oxygen Delivery Method): room air        02-29 @ 07:01  -  03-01 @ 07:00  --------------------------------------------------------  IN: 50 mL / OUT: 0 mL / NET: 50 mL    03-01 @ 07:01 - 03-01 @ 16:32  --------------------------------------------------------  IN: 240 mL / OUT: 0 mL / NET: 240 mL      PHYSICAL EXAM:    General: in no acute distress  HEENT: MMM, conjunctiva and sclera clear  Gastrointestinal: Soft non-tender non-distended; No rebound or guarding  Skin: Warm and dry. No obvious rash  Neuro: No asterixis    LABS:                        11.0   6.92  )-----------( 157      ( 01 Mar 2024 07:58 )             34.8     03-01    132<L>  |  99  |  10  ----------------------------<  93  4.3   |  24  |  0.63    Ca    9.3      01 Mar 2024 07:58  Phos  2.5     03-01  Mg     2.2     03-01    TPro  6.8  /  Alb  4.4  /  TBili  1.6<H>  /  DBili  x   /  AST  119<H>  /  ALT  64<H>  /  AlkPhos  284<H>  03-01    PT/INR - ( 01 Mar 2024 07:58 )   PT: 15.9 sec;   INR: 1.41          PTT - ( 01 Mar 2024 07:58 )  PTT:38.5 sec      Urinalysis Basic - ( 01 Mar 2024 07:58 )    Color: x / Appearance: x / SG: x / pH: x  Gluc: 93 mg/dL / Ketone: x  / Bili: x / Urobili: x   Blood: x / Protein: x / Nitrite: x   Leuk Esterase: x / RBC: x / WBC x   Sq Epi: x / Non Sq Epi: x / Bacteria: x                Culture - Body Fluid with Gram Stain (collected 28 Feb 2024 14:30)  Source: Peritoneal peritoneal fluid  Gram Stain (28 Feb 2024 21:13):    No organisms seen    Rare WBC's  Preliminary Report (29 Feb 2024 08:29):    No growth to date      RADIOLOGY & ADDITIONAL STUDIES:  Reviewed

## 2024-03-01 NOTE — PROGRESS NOTE ADULT - PROBLEM SELECTOR PLAN 3
Patient on Donte 50mg and Lasix 40mg at home for his cirrhosis.   Plan:  - Will hold Diuretics given SBP

## 2024-03-01 NOTE — PROGRESS NOTE ADULT - PROBLEM SELECTOR PLAN 1
Patient with Hx of cirrhosis presumed secondary to past alcohol use, with esophageal varices & recurrent ascites requiring multiple paracentesis in the past.   s/p Paracentesis (7.2 L removed) and TIPS on 2/28. Admitted to MICU post-procedure for monitoring. Has been hemodynamically stable through the stay except for mild tachycardia.   Fluid studies showing SBP (Total Nucleated Cells 774), Fluid cultures NGTD (from 2/28).   Plan:  - Continue ceftriaxone 2g IV q24 for 7 days total to treat for SBP  - f/u Final cultures from ascetic fluid  - Continuing albumin 1.5 g/kg followed by 1g/kg on day 3 (not exceeding 100g)  - Continue lactulose 15 g PO BID and rifaximin 550 mg PO BID

## 2024-03-01 NOTE — PROGRESS NOTE ADULT - SUBJECTIVE AND OBJECTIVE BOX
***Stepdown from MICU to [facility]***    OVERNIGHT EVENTS: NAEO    SUBJECTIVE / INTERVAL HPI: Patient seen and examined at bedside. Patient denying chest pain, SOB, palpitations, cough. Patient denies fever, chills, HA, Dizziness, N/V, abdominal pain, diarrhea, constipation, hematochezia/melena, dysuria, hematuria, new onset weakness/numbness, LE pain and/or swelling. Remaining ROS negative     PHYSICAL EXAM:  General: NAD.   HEENT: NC/AT; PERRL, anicteric sclera; MMM  Neck: supple  Cardiovascular: +S1/S2, RRR  Respiratory: CTA B/L; no W/R/R  Gastrointestinal: soft, NT/ND; +BSx4  Extremities: WWP; no edema, clubbing or cyanosis  Vascular: 2+ radial, DP/PT pulses B/L  Neurological: AAOx3; no focal deficits  Psychiatric: pleasant mood and affect  Dermatologic: no appreciable wounds or damage to the skin    VITAL SIGNS:  Vital Signs Last 24 Hrs  T(C): 36.1 (01 Mar 2024 05:06), Max: 36.7 (29 Feb 2024 21:18)  T(F): 97 (01 Mar 2024 05:06), Max: 98 (29 Feb 2024 21:18)  HR: 63 (01 Mar 2024 08:00) (63 - 86)  BP: 101/59 (01 Mar 2024 08:00) (96/54 - 117/60)  BP(mean): 76 (01 Mar 2024 08:00) (70 - 81)  RR: 18 (01 Mar 2024 08:00) (16 - 18)  SpO2: 100% (01 Mar 2024 08:00) (97% - 100%)  Parameters below as of 01 Mar 2024 08:00  Patient On (Oxygen Delivery Method): room air    MEDICATIONS:  MEDICATIONS  (STANDING):  albumin human 25% IVPB 200 milliLiter(s) IV Intermittent every 2 hours  cefTRIAXone   IVPB 2000 milliGRAM(s) IV Intermittent every 24 hours  chlorhexidine 2% Cloths 1 Application(s) Topical <User Schedule>  enoxaparin Injectable 40 milliGRAM(s) SubCutaneous every 24 hours  influenza   Vaccine 0.5 milliLiter(s) IntraMuscular once  lactulose Syrup 15 Gram(s) Oral every 8 hours  pantoprazole    Tablet 40 milliGRAM(s) Oral before breakfast  rifAXIMin 550 milliGRAM(s) Oral two times a day  MEDICATIONS  (PRN):    ALLERGIES:  Allergies  No Known Allergies  Intolerances    LABS:                        11.0   6.92  )-----------( 157      ( 01 Mar 2024 07:58 )             34.8     03-01    130<L>  |  x   |  x   ----------------------------<  x   x    |  x   |  0.56    Ca    9.5      29 Feb 2024 13:58  Phos  3.2     02-29  Mg     2.4     02-29    TPro  x   /  Alb  x   /  TBili  1.3<H>  /  DBili  x   /  AST  x   /  ALT  x   /  AlkPhos  x   03-01  PT/INR - ( 01 Mar 2024 07:58 )   PT: 15.9 sec;   INR: 1.41     PTT - ( 01 Mar 2024 07:58 )  PTT:38.5 sec  Urinalysis Basic - ( 29 Feb 2024 13:58 )  Color: x / Appearance: x / SG: x / pH: x  Gluc: 123 mg/dL / Ketone: x  / Bili: x / Urobili: x   Blood: x / Protein: x / Nitrite: x   Leuk Esterase: x / RBC: x / WBC x   Sq Epi: x / Non Sq Epi: x / Bacteria: x  CAPILLARY BLOOD GLUCOSE  RADIOLOGY & ADDITIONAL TESTS: Reviewed. ***Stepdown from MICU to [facility]***  63M w PMHx HTN, remote history of alcohol use disorder, and cirrhosis w esophageal varices and recurrent ascites requiring frequent paracenteses, presenting with abdominal distension as-advised by his outpatient Gastroenterologist, Dr. Oliva. Despite treatment with spironolactone & lasix (50 & 40mg daily, respectively), pt currently has refractory ascites, requiring paracentesis & drainage of approx. 23L abdominal fluid in the last 2 weeks alone. Due to his social circumstances at this time, Mr. Newton is not a candidate for indwelling peritoneal catheter, scheduled TIPS as outpatient, or liver transplantation. In light of his nearly weekly need for peritoneal fluid drainage and concomitant deterioration in nutritional status, Mr. Newton and his family have decided, following extensive discussion of risks, benefits, and alternatives with Dr. Vizcarra and his current inpatient providers, to proceed with inpatient TIPS procedure on his current admission. Patient had paracentesis and TIPS procedure performed today with IR team, and now is transferred to MICU service for close monitoring for possible complications, such as hepatic encephalopathy, cardiovascular or pulmonary congestion, or hemorrhages or active bleeding. Patient arrives to MICU with VSS, tolerating PO diet, and endorses some abdominal pain, addressed with pain managment. Patient remains HDS without signs of bleeding (Hg remains stable), no signs of encephalopathy, no signs of cardiovascular congestion, with slightly improving ascites s/p paracentesis and TIPS procedure.     OVERNIGHT EVENTS: NAEO    SUBJECTIVE / INTERVAL HPI: Patient seen and examined at bedside. Patient denying chest pain, SOB, cough. Remaining ROS negative     PHYSICAL EXAM:  General: no acute distress, no jaundice  HEENT: NC/AT; PERRL, anicteric sclera; MMM  Neck: supple  Cardiovascular: +S1/S2, RRR, +JVP  Respiratory: CTA B/L  Gastrointestinal: moderate ascites, slightly improving from yesterday; no leakage from TIPS site, dressing intact  Extremities: WWP; no edema  Vascular: 2+ radial, DP/PT pulses B/L  Neurological: AAOx3; no focal deficits; no asterxis  Psychiatric: pleasant mood and affect  Dermatologic: no appreciable wounds or damage to the skin    VITAL SIGNS:  Vital Signs Last 24 Hrs  T(C): 36.1 (01 Mar 2024 05:06), Max: 36.7 (29 Feb 2024 21:18)  T(F): 97 (01 Mar 2024 05:06), Max: 98 (29 Feb 2024 21:18)  HR: 63 (01 Mar 2024 08:00) (63 - 86)  BP: 101/59 (01 Mar 2024 08:00) (96/54 - 117/60)  BP(mean): 76 (01 Mar 2024 08:00) (70 - 81)  RR: 18 (01 Mar 2024 08:00) (16 - 18)  SpO2: 100% (01 Mar 2024 08:00) (97% - 100%)  Parameters below as of 01 Mar 2024 08:00  Patient On (Oxygen Delivery Method): room air    MEDICATIONS:  MEDICATIONS  (STANDING):  albumin human 25% IVPB 200 milliLiter(s) IV Intermittent every 2 hours  cefTRIAXone   IVPB 2000 milliGRAM(s) IV Intermittent every 24 hours  chlorhexidine 2% Cloths 1 Application(s) Topical <User Schedule>  enoxaparin Injectable 40 milliGRAM(s) SubCutaneous every 24 hours  influenza   Vaccine 0.5 milliLiter(s) IntraMuscular once  lactulose Syrup 15 Gram(s) Oral every 8 hours  pantoprazole    Tablet 40 milliGRAM(s) Oral before breakfast  rifAXIMin 550 milliGRAM(s) Oral two times a day  MEDICATIONS  (PRN):    ALLERGIES:  Allergies  No Known Allergies  Intolerances    LABS:                        11.0   6.92  )-----------( 157      ( 01 Mar 2024 07:58 )             34.8     03-01    130<L>  |  x   |  x   ----------------------------<  x   x    |  x   |  0.56    Ca    9.5      29 Feb 2024 13:58  Phos  3.2     02-29  Mg     2.4     02-29    TPro  x   /  Alb  x   /  TBili  1.3<H>  /  DBili  x   /  AST  x   /  ALT  x   /  AlkPhos  x   03-01  PT/INR - ( 01 Mar 2024 07:58 )   PT: 15.9 sec;   INR: 1.41     PTT - ( 01 Mar 2024 07:58 )  PTT:38.5 sec  Urinalysis Basic - ( 29 Feb 2024 13:58 )  Color: x / Appearance: x / SG: x / pH: x  Gluc: 123 mg/dL / Ketone: x  / Bili: x / Urobili: x   Blood: x / Protein: x / Nitrite: x   Leuk Esterase: x / RBC: x / WBC x   Sq Epi: x / Non Sq Epi: x / Bacteria: x  CAPILLARY BLOOD GLUCOSE  RADIOLOGY & ADDITIONAL TESTS: Reviewed.    TTE 2/26/24:  The left ventricle is normal in size, wall thickness, and systolic   function with a visually estimated ejection fraction of 55-60%.    1. Normal left and right ventricular size and systolic function.   2. Normal atria.   3. No significant valvular disease.   4. No pericardial effusion.   5. No prior echo is available for comparison.    CT A/P 2/26/24:  Liver cirrhosis with signs of portal hypertension including splenomegaly,   large volume ascites in all 4 quadrants and recanalized umbilical vein as   well as moderate caliber esophageal and gastric varices. Ascites   extending into the left hemiscrotum and a small umbilical hernia. ***Stepdown from MICU to [facility]***  63M w PMHx HTN, remote history of alcohol use disorder, and cirrhosis w esophageal varices and recurrent ascites requiring frequent paracenteses, presented with abdominal distension advised by his outpatient Gastroenterologist, Dr. Oliva. Despite treatment with spironolactone 50 mg & lasix 40mg daily, he had refractory ascites, requiring paracentesis & drainage of approx. 23L abdominal fluid in the last 2 weeks alone. Due to his social circumstances at this time, Mr. Newton is not a candidate for indwelling peritoneal catheter, scheduled TIPS as outpatient, or liver transplantation. In light of his nearly weekly need for peritoneal fluid drainage and concomitant deterioration in nutritional status, Mr. Newton and his family decided, following extensive discussion of risks, benefits, and alternatives with Dr. Vizcarra and his current inpatient providers, to proceed with inpatient TIPS procedure on his current admission. On 2/28/24, patient had paracentesis and TIPS procedure performed with IR team, and now was transferred to MICU service for close monitoring for possible complications, such as hepatic encephalopathy, cardiovascular or pulmonary congestion, or hemorrhages or active bleeding. In the MICU he has been HDS, VSS, tolerating PO diet, and endorses improving anterior abdominal pain, relieved with 2 doses of morphine. Patient had 5-6 BMS on lactulose and rifaximin regimen, ambulates OOB to chair, euvolemic on exam with adequate UOP and no peripheral edema. His hemoglobulin has remained stable, without signs of bleeding, no signs of encephalopathy, no signs of cardiovascular congestion, with slightly improving abdominal fluid wave ascites.     OVERNIGHT EVENTS: NAEO    SUBJECTIVE / INTERVAL HPI: Patient seen and examined at bedside. Patient denying chest pain, SOB, cough. Remaining ROS negative     PHYSICAL EXAM:  General: no acute distress, no jaundice  HEENT: NC/AT; PERRL, anicteric sclera; MMM  Neck: supple  Cardiovascular: +S1/S2, RRR, +JVP  Respiratory: CTA B/L  Gastrointestinal: moderate ascites, slightly improving from yesterday; no leakage from TIPS site, dressing intact  Extremities: WWP; no edema  Vascular: 2+ radial, DP/PT pulses B/L  Neurological: AAOx3; no focal deficits; no asterixes  Psychiatric: pleasant mood and affect  Dermatologic: no appreciable wounds or damage to the skin    VITAL SIGNS:  Vital Signs Last 24 Hrs  T(C): 36.1 (01 Mar 2024 05:06), Max: 36.7 (29 Feb 2024 21:18)  T(F): 97 (01 Mar 2024 05:06), Max: 98 (29 Feb 2024 21:18)  HR: 63 (01 Mar 2024 08:00) (63 - 86)  BP: 101/59 (01 Mar 2024 08:00) (96/54 - 117/60)  BP(mean): 76 (01 Mar 2024 08:00) (70 - 81)  RR: 18 (01 Mar 2024 08:00) (16 - 18)  SpO2: 100% (01 Mar 2024 08:00) (97% - 100%)  Parameters below as of 01 Mar 2024 08:00  Patient On (Oxygen Delivery Method): room air    MEDICATIONS:  MEDICATIONS  (STANDING):  albumin human 25% IVPB 200 milliLiter(s) IV Intermittent every 2 hours  cefTRIAXone   IVPB 2000 milliGRAM(s) IV Intermittent every 24 hours  chlorhexidine 2% Cloths 1 Application(s) Topical <User Schedule>  enoxaparin Injectable 40 milliGRAM(s) SubCutaneous every 24 hours  influenza   Vaccine 0.5 milliLiter(s) IntraMuscular once  lactulose Syrup 15 Gram(s) Oral every 8 hours  pantoprazole    Tablet 40 milliGRAM(s) Oral before breakfast  rifAXIMin 550 milliGRAM(s) Oral two times a day  MEDICATIONS  (PRN):    ALLERGIES:  Allergies  No Known Allergies  Intolerances    LABS:                        11.0   6.92  )-----------( 157      ( 01 Mar 2024 07:58 )             34.8     03-01    130<L>  |  x   |  x   ----------------------------<  x   x    |  x   |  0.56    Ca    9.5      29 Feb 2024 13:58  Phos  3.2     02-29  Mg     2.4     02-29    TPro  x   /  Alb  x   /  TBili  1.3<H>  /  DBili  x   /  AST  x   /  ALT  x   /  AlkPhos  x   03-01  PT/INR - ( 01 Mar 2024 07:58 )   PT: 15.9 sec;   INR: 1.41     PTT - ( 01 Mar 2024 07:58 )  PTT:38.5 sec  Urinalysis Basic - ( 29 Feb 2024 13:58 )  Color: x / Appearance: x / SG: x / pH: x  Gluc: 123 mg/dL / Ketone: x  / Bili: x / Urobili: x   Blood: x / Protein: x / Nitrite: x   Leuk Esterase: x / RBC: x / WBC x   Sq Epi: x / Non Sq Epi: x / Bacteria: x  CAPILLARY BLOOD GLUCOSE  RADIOLOGY & ADDITIONAL TESTS: Reviewed.    TTE 2/26/24:  The left ventricle is normal in size, wall thickness, and systolic   function with a visually estimated ejection fraction of 55-60%.    1. Normal left and right ventricular size and systolic function.   2. Normal atria.   3. No significant valvular disease.   4. No pericardial effusion.   5. No prior echo is available for comparison.    CT A/P 2/26/24:  Liver cirrhosis with signs of portal hypertension including splenomegaly,   large volume ascites in all 4 quadrants and recanalized umbilical vein as   well as moderate caliber esophageal and gastric varices. Ascites   extending into the left hemiscrotum and a small umbilical hernia.

## 2024-03-01 NOTE — PROGRESS NOTE ADULT - ASSESSMENT
63M h/o cirrhosis (c/b recurrent ascites), ETOH use disorder (in remission), who p/w ascites. Hepatology consulted for cirrhosis management, and patient underwent TIPS and paracentesis this admission. Course complicated by SBP.     Echocardiogram:    1. Normal left and right ventricular size and systolic function.   2. Normal atria.   3. No significant valvular disease.   4. No pericardial effusion.   5. No prior echo is available for comparison.    CT abdomen/pelvis:   - Liver cirrhosis with signs of portal hypertension including splenomegaly, large volume ascites in all 4 quadrants and recanalized umbilical vein as well as moderate caliber esophageal and gastric varices.   - Ascites extending into the left hemiscrotum and a small umbilical hernia.  - No evidence of focal enhancing liver lesion to suspect hepatocellular carcinoma.    Underwent IR-guided paracentesis and TIPS procedure on 02/28. Had 7.2L removed with IR and was given 150 cc 25% albumin.  Ascites fluid positive for SBP, so started on ceftriaxone 2g IV q24. Stepped up to MICU for post-TIPS monitoring.   INR up-trending today to 1.5 and Meld 3.0 score: 21.    Recommendations:  - Continue ceftriaxone 2g IV q24 for 7 days total to treat for SBP  - Follow-up ascites fluid culture susceptibilities   - Hold diuretics for now given SBP   - Give albumin 1.5 g/kg yesterday 2/19, followed by 1 g/kg on day 3 (not exceeding 100g)   - Continue lactulose 15 g PO BID and rifaximin 550 mg PO BID   - Monitor for signs of liver failure or hepatic encephalopathy s/p TIPS   - Can increase lactulose and titrate to mental status if signs of hepatic encephalopathy observed   - Continue pantoprazole 40 mg PO daily   - Trend CBC, CMP, and INR daily   - F/u PETH which is pending     Lilliam Gusman MD  Gastroenterology Fellow, PGY -6  Weekday Pager [phone]

## 2024-03-01 NOTE — PROGRESS NOTE ADULT - PROBLEM SELECTOR PLAN 2
Patient with Hx of cirrhosis presumed secondary to past alcohol use, with esophageal varices & recurrent ascites requiring multiple paracentesis (of approximately 22.5L within the last month). Prior workup showing AFP 2.2, KARYN 1:320, SMA 1:20 and negative Hepatitis panel. Is on Ark[place] 50mg and Lasix 40mg at home.   s/p Paracentesis and TIPS on 2/28 (see above)  Plan:  - Holding diuretics for now   - Trend CBC, CMP, and INR daily   - F/u PETH which is pending  - Continue pantoprazole 40 mg PO daily  - SBP management as above

## 2024-03-01 NOTE — PROGRESS NOTE ADULT - PROBLEM SELECTOR PLAN 4
F: Albumin 1.5g/kg   E: Replete as needed  N: DASH diet  DVT PPx: Lovenox 40mg   GI PPx: Omeprazole 20mg qd  Dispo: [facility]  Code: FULL CODE.

## 2024-03-02 DIAGNOSIS — D63.8 ANEMIA IN OTHER CHRONIC DISEASES CLASSIFIED ELSEWHERE: ICD-10-CM

## 2024-03-02 LAB
ALBUMIN SERPL ELPH-MCNC: 4 G/DL — SIGNIFICANT CHANGE UP (ref 3.3–5)
ALP SERPL-CCNC: 257 U/L — HIGH (ref 40–120)
ALT FLD-CCNC: 64 U/L — HIGH (ref 10–45)
ANION GAP SERPL CALC-SCNC: 9 MMOL/L — SIGNIFICANT CHANGE UP (ref 5–17)
APTT BLD: 42.7 SEC — HIGH (ref 24.5–35.6)
AST SERPL-CCNC: 122 U/L — HIGH (ref 10–40)
BASOPHILS # BLD AUTO: 0.08 K/UL — SIGNIFICANT CHANGE UP (ref 0–0.2)
BASOPHILS NFR BLD AUTO: 1.6 % — SIGNIFICANT CHANGE UP (ref 0–2)
BILIRUB SERPL-MCNC: 1 MG/DL — SIGNIFICANT CHANGE UP (ref 0.2–1.2)
BUN SERPL-MCNC: 11 MG/DL — SIGNIFICANT CHANGE UP (ref 7–23)
CALCIUM SERPL-MCNC: 8.5 MG/DL — SIGNIFICANT CHANGE UP (ref 8.4–10.5)
CHLORIDE SERPL-SCNC: 100 MMOL/L — SIGNIFICANT CHANGE UP (ref 96–108)
CO2 SERPL-SCNC: 23 MMOL/L — SIGNIFICANT CHANGE UP (ref 22–31)
CREAT SERPL-MCNC: 0.66 MG/DL — SIGNIFICANT CHANGE UP (ref 0.5–1.3)
EGFR: 107 ML/MIN/1.73M2 — SIGNIFICANT CHANGE UP
EOSINOPHIL # BLD AUTO: 0.16 K/UL — SIGNIFICANT CHANGE UP (ref 0–0.5)
EOSINOPHIL NFR BLD AUTO: 3.1 % — SIGNIFICANT CHANGE UP (ref 0–6)
GLUCOSE SERPL-MCNC: 94 MG/DL — SIGNIFICANT CHANGE UP (ref 70–99)
HCT VFR BLD CALC: 27 % — LOW (ref 39–50)
HCT VFR BLD CALC: 27 % — LOW (ref 39–50)
HGB BLD-MCNC: 8.6 G/DL — LOW (ref 13–17)
HGB BLD-MCNC: 8.9 G/DL — LOW (ref 13–17)
IMM GRANULOCYTES NFR BLD AUTO: 0.2 % — SIGNIFICANT CHANGE UP (ref 0–0.9)
INR BLD: 1.78 — HIGH (ref 0.85–1.18)
LYMPHOCYTES # BLD AUTO: 1.07 K/UL — SIGNIFICANT CHANGE UP (ref 1–3.3)
LYMPHOCYTES # BLD AUTO: 20.7 % — SIGNIFICANT CHANGE UP (ref 13–44)
MAGNESIUM SERPL-MCNC: 2.1 MG/DL — SIGNIFICANT CHANGE UP (ref 1.6–2.6)
MCHC RBC-ENTMCNC: 25.4 PG — LOW (ref 27–34)
MCHC RBC-ENTMCNC: 25.9 PG — LOW (ref 27–34)
MCHC RBC-ENTMCNC: 31.9 GM/DL — LOW (ref 32–36)
MCHC RBC-ENTMCNC: 33 GM/DL — SIGNIFICANT CHANGE UP (ref 32–36)
MCV RBC AUTO: 78.7 FL — LOW (ref 80–100)
MCV RBC AUTO: 79.6 FL — LOW (ref 80–100)
MELD SCORE WITH DIALYSIS: 28 POINTS — SIGNIFICANT CHANGE UP
MELD SCORE WITHOUT DIALYSIS: 17 POINTS — SIGNIFICANT CHANGE UP
MONOCYTES # BLD AUTO: 0.55 K/UL — SIGNIFICANT CHANGE UP (ref 0–0.9)
MONOCYTES NFR BLD AUTO: 10.7 % — SIGNIFICANT CHANGE UP (ref 2–14)
NEUTROPHILS # BLD AUTO: 3.29 K/UL — SIGNIFICANT CHANGE UP (ref 1.8–7.4)
NEUTROPHILS NFR BLD AUTO: 63.7 % — SIGNIFICANT CHANGE UP (ref 43–77)
NRBC # BLD: 0 /100 WBCS — SIGNIFICANT CHANGE UP (ref 0–0)
NRBC # BLD: 0 /100 WBCS — SIGNIFICANT CHANGE UP (ref 0–0)
PHOSPHATE SERPL-MCNC: 2.5 MG/DL — SIGNIFICANT CHANGE UP (ref 2.5–4.5)
PLATELET # BLD AUTO: 132 K/UL — LOW (ref 150–400)
PLATELET # BLD AUTO: 135 K/UL — LOW (ref 150–400)
POTASSIUM SERPL-MCNC: 4.3 MMOL/L — SIGNIFICANT CHANGE UP (ref 3.5–5.3)
POTASSIUM SERPL-SCNC: 4.3 MMOL/L — SIGNIFICANT CHANGE UP (ref 3.5–5.3)
PROT SERPL-MCNC: 5.8 G/DL — LOW (ref 6–8.3)
PROTHROM AB SERPL-ACNC: 19.9 SEC — HIGH (ref 9.5–13)
RBC # BLD: 3.39 M/UL — LOW (ref 4.2–5.8)
RBC # BLD: 3.43 M/UL — LOW (ref 4.2–5.8)
RBC # FLD: 19.5 % — HIGH (ref 10.3–14.5)
RBC # FLD: 19.6 % — HIGH (ref 10.3–14.5)
SODIUM SERPL-SCNC: 132 MMOL/L — LOW (ref 135–145)
WBC # BLD: 5.16 K/UL — SIGNIFICANT CHANGE UP (ref 3.8–10.5)
WBC # BLD: 5.19 K/UL — SIGNIFICANT CHANGE UP (ref 3.8–10.5)
WBC # FLD AUTO: 5.16 K/UL — SIGNIFICANT CHANGE UP (ref 3.8–10.5)
WBC # FLD AUTO: 5.19 K/UL — SIGNIFICANT CHANGE UP (ref 3.8–10.5)

## 2024-03-02 PROCEDURE — 99233 SBSQ HOSP IP/OBS HIGH 50: CPT | Mod: GC

## 2024-03-02 RX ORDER — LACTULOSE 10 G/15ML
10 SOLUTION ORAL EVERY 12 HOURS
Refills: 0 | Status: DISCONTINUED | OUTPATIENT
Start: 2024-03-02 | End: 2024-03-03

## 2024-03-02 RX ADMIN — LACTULOSE 15 GRAM(S): 10 SOLUTION ORAL at 06:04

## 2024-03-02 RX ADMIN — PANTOPRAZOLE SODIUM 40 MILLIGRAM(S): 20 TABLET, DELAYED RELEASE ORAL at 06:04

## 2024-03-02 RX ADMIN — ENOXAPARIN SODIUM 40 MILLIGRAM(S): 100 INJECTION SUBCUTANEOUS at 09:45

## 2024-03-02 RX ADMIN — LACTULOSE 10 GRAM(S): 10 SOLUTION ORAL at 17:56

## 2024-03-02 RX ADMIN — CEFTRIAXONE 100 MILLIGRAM(S): 500 INJECTION, POWDER, FOR SOLUTION INTRAMUSCULAR; INTRAVENOUS at 21:40

## 2024-03-02 NOTE — PROGRESS NOTE ADULT - PROBLEM SELECTOR PLAN 2
Patient with Hx of cirrhosis presumed secondary to past alcohol use, with esophageal varices & recurrent ascites requiring multiple paracentesis (of approximately 22.5L within the last month). Prior workup showing AFP 2.2, KARYN 1:320, SMA 1:20 and negative Hepatitis panel. Is on [place]ia 50mg and Lasix 40mg at home.   s/p Paracentesis and TIPS on 2/28 (see above)  Plan:  - Holding diuretics for now   - Trend CBC, CMP, and INR daily   - F/u PETH which is pending  - Continue pantoprazole 40 mg PO daily  - SBP management as above

## 2024-03-02 NOTE — PROGRESS NOTE ADULT - ASSESSMENT
63M w PMHx HTN, remote history of alcohol use disorder, and cirrhosis, esophageal varices, and recurrent ascites requiring frequent paracenteses presenting with abdominal distension, s/p paracentesis with 7. & inpatient TIPS procedure, admitted to MICU for monitoring and now hemodynamically stable for stepdown to [facility]

## 2024-03-02 NOTE — PROGRESS NOTE ADULT - SUBJECTIVE AND OBJECTIVE BOX
Patient is a 63y old  Male who presents with a chief complaint of Ascites (01 Mar 2024 16:31)      INTERVAL HPI/OVERNIGHT EVENTS: post prandial abd pain     MEDICATIONS  (STANDING):  cefTRIAXone   IVPB 2000 milliGRAM(s) IV Intermittent every 24 hours  chlorhexidine 2% Cloths 1 Application(s) Topical <User Schedule>  enoxaparin Injectable 40 milliGRAM(s) SubCutaneous every 24 hours  influenza   Vaccine 0.5 milliLiter(s) IntraMuscular once  lactulose Syrup 10 Gram(s) Oral every 12 hours  pantoprazole    Tablet 40 milliGRAM(s) Oral before breakfast  rifAXIMin 550 milliGRAM(s) Oral two times a day    MEDICATIONS  (PRN):      __________________________________________________  REVIEW OF SYSTEMS:    CONSTITUTIONAL: No fever,   EYES: no acute visual disturbances  NECK: No pain or stiffness  RESPIRATORY: No cough; No shortness of breath  CARDIOVASCULAR: No chest pain, no palpitations  GASTROINTESTINAL: No pain. No nausea or vomiting; No diarrhea   NEUROLOGICAL: No headache or numbness, no tremors  MUSCULOSKELETAL: No joint pain, no muscle pain  GENITOURINARY: no dysuria, no frequency, no hesitancy  PSYCHIATRY: no depression , no anxiety  ALL OTHER  ROS negative        Vital Signs Last 24 Hrs  T(C): 37.1 (02 Mar 2024 05:28), Max: 37.1 (02 Mar 2024 05:28)  T(F): 98.7 (02 Mar 2024 05:28), Max: 98.7 (02 Mar 2024 05:28)  HR: 70 (02 Mar 2024 05:28) (70 - 73)  BP: 97/55 (02 Mar 2024 05:28) (96/61 - 100/61)  BP(mean): --  RR: 17 (02 Mar 2024 05:28) (17 - 18)  SpO2: 96% (02 Mar 2024 05:28) (96% - 99%)    Parameters below as of 02 Mar 2024 05:28  Patient On (Oxygen Delivery Method): room air        ________________________________________________  PHYSICAL EXAM:  GENERAL: NAD  HEENT: Normocephalic;  conjunctivae and sclerae clear; moist mucous membranes;   NECK : supple  CHEST/LUNG: Clear to auscultation bilaterally with good air entry   HEART: S1 S2  regular; no murmurs, gallops or rubs  ABDOMEN: Soft, mildly ttp, Nondistended; Bowel sounds present  EXTREMITIES: no cyanosis; no edema; no calf tenderness  SKIN: warm and dry; no rash  NERVOUS SYSTEM:  Awake and alert; Oriented  to place, person and time ; no new deficits    _________________________________________________  LABS:                        8.9    5.16  )-----------( 135      ( 02 Mar 2024 08:48 )             27.0     03-02    132<L>  |  100  |  11  ----------------------------<  94  4.3   |  23  |  0.66    Ca    8.5      02 Mar 2024 08:48  Phos  2.5     03-02  Mg     2.1     03-02    TPro  5.8<L>  /  Alb  4.0  /  TBili  1.0  /  DBili  x   /  AST  122<H>  /  ALT  64<H>  /  AlkPhos  257<H>  03-02    PT/INR - ( 02 Mar 2024 08:48 )   PT: 19.9 sec;   INR: 1.78          PTT - ( 02 Mar 2024 08:48 )  PTT:42.7 sec  Urinalysis Basic - ( 02 Mar 2024 08:48 )    Color: x / Appearance: x / SG: x / pH: x  Gluc: 94 mg/dL / Ketone: x  / Bili: x / Urobili: x   Blood: x / Protein: x / Nitrite: x   Leuk Esterase: x / RBC: x / WBC x   Sq Epi: x / Non Sq Epi: x / Bacteria: x      CAPILLARY BLOOD GLUCOSE            RADIOLOGY & ADDITIONAL TESTS:      Plan of care was discussed with patient and /or primary care giver; all questions and concerns were addressed and care was aligned with patient's wishes.

## 2024-03-02 NOTE — PROGRESS NOTE ADULT - PROBLEM SELECTOR PLAN 1
Patient with Hx of cirrhosis presumed secondary to past alcohol use, with esophageal varices & recurrent ascites requiring multiple paracentesis in the past.   s/p Paracentesis (7.2 L removed) and TIPS on 2/28. Admitted to MICU post-procedure for monitoring. Has been hemodynamically stable through the stay except for mild tachycardia.   Fluid studies showing SBP (Total Nucleated Cells 774), Fluid cultures NGTD (from 2/28).   Plan:  - Continue ceftriaxone 2g IV q24 for 7 days total to treat for SBP  - f/u Final cultures from ascetic fluid  - Continuing albumin 1.5 g/kg followed by 1g/kg on day 3 (not exceeding 100g)  - Continue lactulose 10 g PO BID and rifaximin 550 mg PO BID

## 2024-03-03 LAB
ALBUMIN SERPL ELPH-MCNC: 3.8 G/DL — SIGNIFICANT CHANGE UP (ref 3.3–5)
ALP SERPL-CCNC: 307 U/L — HIGH (ref 40–120)
ALT FLD-CCNC: 70 U/L — HIGH (ref 10–45)
ANION GAP SERPL CALC-SCNC: 8 MMOL/L — SIGNIFICANT CHANGE UP (ref 5–17)
AST SERPL-CCNC: 113 U/L — HIGH (ref 10–40)
BASOPHILS # BLD AUTO: 0.06 K/UL — SIGNIFICANT CHANGE UP (ref 0–0.2)
BASOPHILS NFR BLD AUTO: 1.2 % — SIGNIFICANT CHANGE UP (ref 0–2)
BILIRUB SERPL-MCNC: 1.1 MG/DL — SIGNIFICANT CHANGE UP (ref 0.2–1.2)
BUN SERPL-MCNC: 10 MG/DL — SIGNIFICANT CHANGE UP (ref 7–23)
CALCIUM SERPL-MCNC: 8.8 MG/DL — SIGNIFICANT CHANGE UP (ref 8.4–10.5)
CHLORIDE SERPL-SCNC: 101 MMOL/L — SIGNIFICANT CHANGE UP (ref 96–108)
CO2 SERPL-SCNC: 22 MMOL/L — SIGNIFICANT CHANGE UP (ref 22–31)
CREAT SERPL-MCNC: 0.6 MG/DL — SIGNIFICANT CHANGE UP (ref 0.5–1.3)
EGFR: 110 ML/MIN/1.73M2 — SIGNIFICANT CHANGE UP
EOSINOPHIL # BLD AUTO: 0.22 K/UL — SIGNIFICANT CHANGE UP (ref 0–0.5)
EOSINOPHIL NFR BLD AUTO: 4.4 % — SIGNIFICANT CHANGE UP (ref 0–6)
GLUCOSE SERPL-MCNC: 94 MG/DL — SIGNIFICANT CHANGE UP (ref 70–99)
HCT VFR BLD CALC: 30.4 % — LOW (ref 39–50)
HGB BLD-MCNC: 9.7 G/DL — LOW (ref 13–17)
IMM GRANULOCYTES NFR BLD AUTO: 0.2 % — SIGNIFICANT CHANGE UP (ref 0–0.9)
INR BLD: 1.39 — HIGH (ref 0.85–1.18)
LYMPHOCYTES # BLD AUTO: 1.12 K/UL — SIGNIFICANT CHANGE UP (ref 1–3.3)
LYMPHOCYTES # BLD AUTO: 22.4 % — SIGNIFICANT CHANGE UP (ref 13–44)
MAGNESIUM SERPL-MCNC: 2.4 MG/DL — SIGNIFICANT CHANGE UP (ref 1.6–2.6)
MCHC RBC-ENTMCNC: 25.7 PG — LOW (ref 27–34)
MCHC RBC-ENTMCNC: 31.9 GM/DL — LOW (ref 32–36)
MCV RBC AUTO: 80.4 FL — SIGNIFICANT CHANGE UP (ref 80–100)
MONOCYTES # BLD AUTO: 0.5 K/UL — SIGNIFICANT CHANGE UP (ref 0–0.9)
MONOCYTES NFR BLD AUTO: 10 % — SIGNIFICANT CHANGE UP (ref 2–14)
NEUTROPHILS # BLD AUTO: 3.09 K/UL — SIGNIFICANT CHANGE UP (ref 1.8–7.4)
NEUTROPHILS NFR BLD AUTO: 61.8 % — SIGNIFICANT CHANGE UP (ref 43–77)
NRBC # BLD: 0 /100 WBCS — SIGNIFICANT CHANGE UP (ref 0–0)
PHOSPHATE SERPL-MCNC: 2.6 MG/DL — SIGNIFICANT CHANGE UP (ref 2.5–4.5)
PLATELET # BLD AUTO: 157 K/UL — SIGNIFICANT CHANGE UP (ref 150–400)
POTASSIUM SERPL-MCNC: 4.4 MMOL/L — SIGNIFICANT CHANGE UP (ref 3.5–5.3)
POTASSIUM SERPL-SCNC: 4.4 MMOL/L — SIGNIFICANT CHANGE UP (ref 3.5–5.3)
PROT SERPL-MCNC: 6 G/DL — SIGNIFICANT CHANGE UP (ref 6–8.3)
PROTHROM AB SERPL-ACNC: 15.7 SEC — HIGH (ref 9.5–13)
RBC # BLD: 3.78 M/UL — LOW (ref 4.2–5.8)
RBC # FLD: 19.7 % — HIGH (ref 10.3–14.5)
SODIUM SERPL-SCNC: 131 MMOL/L — LOW (ref 135–145)
WBC # BLD: 5 K/UL — SIGNIFICANT CHANGE UP (ref 3.8–10.5)
WBC # FLD AUTO: 5 K/UL — SIGNIFICANT CHANGE UP (ref 3.8–10.5)

## 2024-03-03 PROCEDURE — 99232 SBSQ HOSP IP/OBS MODERATE 35: CPT | Mod: GC

## 2024-03-03 PROCEDURE — 99233 SBSQ HOSP IP/OBS HIGH 50: CPT | Mod: GC

## 2024-03-03 RX ORDER — LACTULOSE 10 G/15ML
10 SOLUTION ORAL EVERY 12 HOURS
Refills: 0 | Status: DISCONTINUED | OUTPATIENT
Start: 2024-03-03 | End: 2024-03-05

## 2024-03-03 RX ADMIN — PANTOPRAZOLE SODIUM 40 MILLIGRAM(S): 20 TABLET, DELAYED RELEASE ORAL at 06:04

## 2024-03-03 RX ADMIN — LACTULOSE 10 GRAM(S): 10 SOLUTION ORAL at 06:05

## 2024-03-03 RX ADMIN — ENOXAPARIN SODIUM 40 MILLIGRAM(S): 100 INJECTION SUBCUTANEOUS at 09:34

## 2024-03-03 RX ADMIN — CEFTRIAXONE 100 MILLIGRAM(S): 500 INJECTION, POWDER, FOR SOLUTION INTRAMUSCULAR; INTRAVENOUS at 21:30

## 2024-03-03 NOTE — PROGRESS NOTE ADULT - PROBLEM SELECTOR PLAN 4
F: Albumin 1.5g/kg   E: Replete as needed  N: DASH diet  DVT PPx: Lovenox 40mg   GI PPx: Omeprazole 20mg qd  Dispo: [facility]  Code: FULL CODE. F: Albumin 1g/kg  E: Replete as needed  N: DASH diet  DVT PPx: Lovenox 40mg   GI PPx: Omeprazole 20mg qd  Dispo: [facility]  Code: FULL CODE.

## 2024-03-03 NOTE — PROGRESS NOTE ADULT - SUBJECTIVE AND OBJECTIVE BOX
Hepatology Consult Progress Note:     OVERNIGHT EVENTS: QUINCY.    SUBJECTIVE / INTERVAL HPI:   Patient seen and examined at bedside. Overall reports that he feels well. Good mentation and no asterixis noted on exam.       VITAL SIGNS:  Vital Signs Last 24 Hrs  T(C): 36.3 (03 Mar 2024 05:52), Max: 36.6 (02 Mar 2024 13:29)  T(F): 97.4 (03 Mar 2024 05:52), Max: 97.9 (02 Mar 2024 13:29)  HR: 66 (03 Mar 2024 05:52) (66 - 80)  BP: 97/57 (03 Mar 2024 05:52) (97/57 - 101/61)  BP(mean): --  RR: 17 (03 Mar 2024 05:52) (17 - 17)  SpO2: 95% (03 Mar 2024 05:52) (95% - 98%)    Parameters below as of 03 Mar 2024 05:52  Patient On (Oxygen Delivery Method): room air      PHYSICAL EXAM:  General: No acute distress  Lungs: Normal respiratory effort and no intercostal retractions  Cardiovascular: RRR  Abdomen: Soft, non-tender, distension much improved   Neurological: Alert and oriented x3, no asterixis   Skin: Warm and dry. No obvious rash      MEDICATIONS:  MEDICATIONS  (STANDING):  cefTRIAXone   IVPB 2000 milliGRAM(s) IV Intermittent every 24 hours  chlorhexidine 2% Cloths 1 Application(s) Topical <User Schedule>  enoxaparin Injectable 40 milliGRAM(s) SubCutaneous every 24 hours  influenza   Vaccine 0.5 milliLiter(s) IntraMuscular once  lactulose Syrup 10 Gram(s) Oral every 12 hours  pantoprazole    Tablet 40 milliGRAM(s) Oral before breakfast  rifAXIMin 550 milliGRAM(s) Oral two times a day    MEDICATIONS  (PRN):      ALLERGIES:  Allergies    No Known Allergies    Intolerances        LABS:                        9.7    5.00  )-----------( 157      ( 03 Mar 2024 05:30 )             30.4     03-03    131<L>  |  101  |  10  ----------------------------<  94  4.4   |  22  |  0.60    Ca    8.8      03 Mar 2024 05:30  Phos  2.6     03-03  Mg     2.4     03-03    TPro  6.0  /  Alb  3.8  /  TBili  1.1  /  DBili  x   /  AST  113<H>  /  ALT  70<H>  /  AlkPhos  307<H>  03-03    PT/INR - ( 03 Mar 2024 05:30 )   PT: 15.7 sec;   INR: 1.39          PTT - ( 02 Mar 2024 08:48 )  PTT:42.7 sec  Urinalysis Basic - ( 03 Mar 2024 05:30 )    Color: x / Appearance: x / SG: x / pH: x  Gluc: 94 mg/dL / Ketone: x  / Bili: x / Urobili: x   Blood: x / Protein: x / Nitrite: x   Leuk Esterase: x / RBC: x / WBC x   Sq Epi: x / Non Sq Epi: x / Bacteria: x      CAPILLARY BLOOD GLUCOSE  RADIOLOGY & ADDITIONAL TESTS: Reviewed.

## 2024-03-03 NOTE — PROGRESS NOTE ADULT - ASSESSMENT
63M h/o cirrhosis (c/b recurrent ascites), ETOH use disorder (in remission), who p/w ascites. Hepatology consulted for cirrhosis management, and patient underwent TIPS and paracentesis this admission. Course complicated by SBP.     Echocardiogram:    1. Normal left and right ventricular size and systolic function.   2. Normal atria.   3. No significant valvular disease.   4. No pericardial effusion.   5. No prior echo is available for comparison.    CT abdomen/pelvis:   - Liver cirrhosis with signs of portal hypertension including splenomegaly, large volume ascites in all 4 quadrants and recanalized umbilical vein as well as moderate caliber esophageal and gastric varices.   - Ascites extending into the left hemiscrotum and a small umbilical hernia.  - No evidence of focal enhancing liver lesion to suspect hepatocellular carcinoma.    Underwent IR-guided paracentesis and TIPS procedure on 02/28. Had 7.2L removed with IR and was given 150 cc 25% albumin.  Ascites fluid positive for SBP, so started on ceftriaxone 2g IV q24. Stepped up to MICU for post-TIPS monitoring.   INR continues to down-trend s/p TIPS. PETH negative. No signs of asterixis on exam.     Recommendations:  - Continue ceftriaxone 2g IV q24 for 7 days total to treat for SBP (02/28 to 03/05)   - Ascites fluid culture NGTD  - Hold diuretics for now given SBP   - Continue lactulose 10 g PO BID and rifaximin 550 mg PO BID   - Monitor for signs of liver failure or hepatic encephalopathy s/p TIPS   - Continue pantoprazole 40 mg PO daily   - Trend CBC, CMP, and INR daily     Case discussed with Dr. Franco. Hepatology Team will continue to follow.     Rosi Madison D.O.   Gastroenterology Fellow  Weekday 7am-5pm Pager: [phone]  Weeknights/Weekend/Holiday Coverage: Please call the  for contact info

## 2024-03-03 NOTE — PROGRESS NOTE ADULT - PROBLEM SELECTOR PLAN 2
Patient with Hx of cirrhosis presumed secondary to past alcohol use, with esophageal varices & recurrent ascites requiring multiple paracentesis (of approximately 22.5L within the last month). Prior workup showing AFP 2.2, KARYN 1:320, SMA 1:20 and negative Hepatitis panel. Is on [place] 50mg and Lasix 40mg at home.   s/p Paracentesis and TIPS on 2/28 (see above)  Plan:  - Holding diuretics for now   - Trend CBC, CMP, and INR daily   - F/u PETH which is pending  - Continue pantoprazole 40 mg PO daily  - SBP management as above

## 2024-03-03 NOTE — PROGRESS NOTE ADULT - PROBLEM SELECTOR PLAN 1
Patient with Hx of cirrhosis presumed secondary to past alcohol use, with esophageal varices & recurrent ascites requiring multiple paracentesis in the past.   s/p Paracentesis (7.2 L removed) and TIPS on 2/28. Admitted to MICU post-procedure for monitoring. Has been hemodynamically stable through the stay except for mild tachycardia.   Fluid studies showing SBP (Total Nucleated Cells 774), Fluid cultures NGTD (from 2/28).   Plan:  - Continue ceftriaxone 2g IV q24 for 7 days total to treat for SBP  - f/u Final cultures from ascetic fluid  - Continuing albumin 1.5 g/kg followed by 1g/kg on day 3 (not exceeding 100g)  - Continue lactulose 10 g PO BID and rifaximin 550 mg PO BID Patient with Hx of cirrhosis presumed secondary to past alcohol use, with esophageal varices & recurrent ascites requiring multiple paracentesis in the past.   s/p Paracentesis (7.2 L removed) and TIPS on 2/28. Admitted to MICU post-procedure for monitoring. Has been hemodynamically stable through the stay except for mild tachycardia.   Fluid studies showing SBP (Total Nucleated Cells 774), Fluid cultures NGTD (from 2/28).   Plan:  - Continue ceftriaxone 2g IV q24 for 7 days total to treat for SBP (3/4)  - f/u Final cultures from ascetic fluid  - Continuing albumin 1g/kg on day 3 (not exceeding 100g)?????  - Continue lactulose 10 g PO BID and rifaximin 550 mg PO BID --> Decrease given 5-6 BM in 24 hours? Patient with Hx of cirrhosis presumed secondary to past alcohol use, with esophageal varices & recurrent ascites requiring multiple paracentesis in the past.   s/p Paracentesis (7.2 L removed) and TIPS on 2/28. Admitted to MICU post-procedure for monitoring. Has been hemodynamically stable through the stay except for mild tachycardia.   Fluid studies showing SBP (Total Nucleated Cells 774), Fluid cultures NGTD (from 2/28).   Plan:  - Continue ceftriaxone 2g IV q24 for 7 days total to treat for SBP (3/4)  - f/u Final cultures from ascetic fluid  - Albumin stopped at this time   - Continue lactulose 10 g PO BID PRN with (2-3 BM per day) and rifaximin 550 mg PO BID

## 2024-03-03 NOTE — PROGRESS NOTE ADULT - SUBJECTIVE AND OBJECTIVE BOX
OVERNIGHT EVENTS: ALEJANDRO    SUBJECTIVE:  The patient is feeling well this AM. States that he had 2 BM overnight. Overall he says that he had about 5-6 BM in the past 24 hours. Denies any pain or discomfort in the abdomen.     VITAL SIGNS:  Vital Signs Last 24 Hrs  T(C): 36.3 (03 Mar 2024 05:52), Max: 36.6 (02 Mar 2024 13:29)  T(F): 97.4 (03 Mar 2024 05:52), Max: 97.9 (02 Mar 2024 13:29)  HR: 66 (03 Mar 2024 05:52) (66 - 80)  BP: 97/57 (03 Mar 2024 05:52) (97/57 - 101/61)  BP(mean): --  RR: 17 (03 Mar 2024 05:52) (17 - 17)  SpO2: 95% (03 Mar 2024 05:52) (95% - 98%)    Parameters below as of 03 Mar 2024 05:52  Patient On (Oxygen Delivery Method): room air        PHYSICAL EXAM:  General: NAD; speaking in full sentences  HEENT: NC/AT; PERRL; EOMI; MMM  Neck: supple; no JVD  Cardiac: RRR; +S1/S2  Pulm: CTA B/L; no W/R/R  GI: soft, distended but not worse from prior, no tenderness.   Extremities: WWP; no edema, clubbing or cyanosis  Vasc: 2+ radial, DP pulses B/L  Neuro: AAOx3; no focal deficits, no asterixes      MEDICATIONS:  MEDICATIONS  (STANDING):  cefTRIAXone   IVPB 2000 milliGRAM(s) IV Intermittent every 24 hours  chlorhexidine 2% Cloths 1 Application(s) Topical <User Schedule>  enoxaparin Injectable 40 milliGRAM(s) SubCutaneous every 24 hours  influenza   Vaccine 0.5 milliLiter(s) IntraMuscular once  lactulose Syrup 10 Gram(s) Oral every 12 hours  pantoprazole    Tablet 40 milliGRAM(s) Oral before breakfast  rifAXIMin 550 milliGRAM(s) Oral two times a day    MEDICATIONS  (PRN):      ALLERGIES:  Allergies    No Known Allergies    Intolerances        LABS:                        9.7    5.00  )-----------( 157      ( 03 Mar 2024 05:30 )             30.4     03-03    131<L>  |  101  |  10  ----------------------------<  94  4.4   |  22  |  0.60    Ca    8.8      03 Mar 2024 05:30  Phos  2.6     03-03  Mg     2.4     03-03    TPro  6.0  /  Alb  3.8  /  TBili  1.1  /  DBili  x   /  AST  113<H>  /  ALT  70<H>  /  AlkPhos  307<H>  03-03    PT/INR - ( 03 Mar 2024 05:30 )   PT: 15.7 sec;   INR: 1.39          PTT - ( 02 Mar 2024 08:48 )  PTT:42.7 sec    RADIOLOGY & ADDITIONAL TESTS: Reviewed.

## 2024-03-04 ENCOUNTER — TRANSCRIPTION ENCOUNTER (OUTPATIENT)
Age: 64
End: 2024-03-04

## 2024-03-04 LAB
ALBUMIN SERPL ELPH-MCNC: 3.3 G/DL — SIGNIFICANT CHANGE UP (ref 3.3–5)
ALP SERPL-CCNC: 311 U/L — HIGH (ref 40–120)
ALT FLD-CCNC: 56 U/L — HIGH (ref 10–45)
ANION GAP SERPL CALC-SCNC: 7 MMOL/L — SIGNIFICANT CHANGE UP (ref 5–17)
APTT BLD: 39 SEC — HIGH (ref 24.5–35.6)
AST SERPL-CCNC: 91 U/L — HIGH (ref 10–40)
BASOPHILS # BLD AUTO: 0.05 K/UL — SIGNIFICANT CHANGE UP (ref 0–0.2)
BASOPHILS NFR BLD AUTO: 1.1 % — SIGNIFICANT CHANGE UP (ref 0–2)
BILIRUB SERPL-MCNC: 0.9 MG/DL — SIGNIFICANT CHANGE UP (ref 0.2–1.2)
BUN SERPL-MCNC: 11 MG/DL — SIGNIFICANT CHANGE UP (ref 7–23)
CALCIUM SERPL-MCNC: 8.2 MG/DL — LOW (ref 8.4–10.5)
CHLORIDE SERPL-SCNC: 104 MMOL/L — SIGNIFICANT CHANGE UP (ref 96–108)
CO2 SERPL-SCNC: 22 MMOL/L — SIGNIFICANT CHANGE UP (ref 22–31)
CREAT SERPL-MCNC: 0.6 MG/DL — SIGNIFICANT CHANGE UP (ref 0.5–1.3)
CULTURE RESULTS: NO GROWTH — SIGNIFICANT CHANGE UP
EGFR: 110 ML/MIN/1.73M2 — SIGNIFICANT CHANGE UP
EOSINOPHIL # BLD AUTO: 0.23 K/UL — SIGNIFICANT CHANGE UP (ref 0–0.5)
EOSINOPHIL NFR BLD AUTO: 5.1 % — SIGNIFICANT CHANGE UP (ref 0–6)
GLUCOSE SERPL-MCNC: 89 MG/DL — SIGNIFICANT CHANGE UP (ref 70–99)
HCT VFR BLD CALC: 27 % — LOW (ref 39–50)
HGB BLD-MCNC: 8.7 G/DL — LOW (ref 13–17)
IMM GRANULOCYTES NFR BLD AUTO: 0.4 % — SIGNIFICANT CHANGE UP (ref 0–0.9)
INR BLD: 1.53 — HIGH (ref 0.85–1.18)
LYMPHOCYTES # BLD AUTO: 0.93 K/UL — LOW (ref 1–3.3)
LYMPHOCYTES # BLD AUTO: 20.6 % — SIGNIFICANT CHANGE UP (ref 13–44)
MAGNESIUM SERPL-MCNC: 2.3 MG/DL — SIGNIFICANT CHANGE UP (ref 1.6–2.6)
MCHC RBC-ENTMCNC: 26.1 PG — LOW (ref 27–34)
MCHC RBC-ENTMCNC: 32.2 GM/DL — SIGNIFICANT CHANGE UP (ref 32–36)
MCV RBC AUTO: 81.1 FL — SIGNIFICANT CHANGE UP (ref 80–100)
MONOCYTES # BLD AUTO: 0.56 K/UL — SIGNIFICANT CHANGE UP (ref 0–0.9)
MONOCYTES NFR BLD AUTO: 12.4 % — SIGNIFICANT CHANGE UP (ref 2–14)
NEUTROPHILS # BLD AUTO: 2.73 K/UL — SIGNIFICANT CHANGE UP (ref 1.8–7.4)
NEUTROPHILS NFR BLD AUTO: 60.4 % — SIGNIFICANT CHANGE UP (ref 43–77)
NRBC # BLD: 0 /100 WBCS — SIGNIFICANT CHANGE UP (ref 0–0)
PETH 16:0/18:1: NEGATIVE NG/ML — SIGNIFICANT CHANGE UP
PETH 16:0/18:2: NEGATIVE NG/ML — SIGNIFICANT CHANGE UP
PETH COMMENTS: SIGNIFICANT CHANGE UP
PHOSPHATE SERPL-MCNC: 3 MG/DL — SIGNIFICANT CHANGE UP (ref 2.5–4.5)
PLATELET # BLD AUTO: 138 K/UL — LOW (ref 150–400)
POTASSIUM SERPL-MCNC: 4.3 MMOL/L — SIGNIFICANT CHANGE UP (ref 3.5–5.3)
POTASSIUM SERPL-SCNC: 4.3 MMOL/L — SIGNIFICANT CHANGE UP (ref 3.5–5.3)
PROT SERPL-MCNC: 5.2 G/DL — LOW (ref 6–8.3)
PROTHROM AB SERPL-ACNC: 17.2 SEC — HIGH (ref 9.5–13)
RBC # BLD: 3.33 M/UL — LOW (ref 4.2–5.8)
RBC # FLD: 20 % — HIGH (ref 10.3–14.5)
SODIUM SERPL-SCNC: 133 MMOL/L — LOW (ref 135–145)
SPECIMEN SOURCE: SIGNIFICANT CHANGE UP
WBC # BLD: 4.52 K/UL — SIGNIFICANT CHANGE UP (ref 3.8–10.5)
WBC # FLD AUTO: 4.52 K/UL — SIGNIFICANT CHANGE UP (ref 3.8–10.5)

## 2024-03-04 PROCEDURE — 99233 SBSQ HOSP IP/OBS HIGH 50: CPT | Mod: GC

## 2024-03-04 PROCEDURE — 99232 SBSQ HOSP IP/OBS MODERATE 35: CPT

## 2024-03-04 RX ADMIN — PANTOPRAZOLE SODIUM 40 MILLIGRAM(S): 20 TABLET, DELAYED RELEASE ORAL at 05:55

## 2024-03-04 RX ADMIN — CHLORHEXIDINE GLUCONATE 1 APPLICATION(S): 213 SOLUTION TOPICAL at 05:56

## 2024-03-04 RX ADMIN — CEFTRIAXONE 100 MILLIGRAM(S): 500 INJECTION, POWDER, FOR SOLUTION INTRAMUSCULAR; INTRAVENOUS at 21:11

## 2024-03-04 RX ADMIN — ENOXAPARIN SODIUM 40 MILLIGRAM(S): 100 INJECTION SUBCUTANEOUS at 09:50

## 2024-03-04 NOTE — PROGRESS NOTE ADULT - SUBJECTIVE AND OBJECTIVE BOX
GASTROENTEROLOGY PROGRESS NOTE  Patient seen and examined at bedside.  SHARAN.  AOx3, no asterixis. Denies abdominal pain or confusion. No complaints. Continues on ceftriaxone 2g    PERTINENT REVIEW OF SYSTEMS:  CONSTITUTIONAL: No weakness, fevers or chills  HEENT: No visual changes; No vertigo or throat pain   GASTROINTESTINAL: As above.  NEUROLOGICAL: No numbness or weakness  SKIN: No itching, burning, rashes, or lesions     Allergies    No Known Allergies    Intolerances      MEDICATIONS:  MEDICATIONS  (STANDING):  cefTRIAXone   IVPB 2000 milliGRAM(s) IV Intermittent every 24 hours  chlorhexidine 2% Cloths 1 Application(s) Topical <User Schedule>  enoxaparin Injectable 40 milliGRAM(s) SubCutaneous every 24 hours  influenza   Vaccine 0.5 milliLiter(s) IntraMuscular once  pantoprazole    Tablet 40 milliGRAM(s) Oral before breakfast  rifAXIMin 550 milliGRAM(s) Oral two times a day    MEDICATIONS  (PRN):  lactulose Syrup 10 Gram(s) Oral every 12 hours PRN constipation    Vital Signs Last 24 Hrs  T(C): 37 (04 Mar 2024 06:00), Max: 37.1 (03 Mar 2024 20:32)  T(F): 98.6 (04 Mar 2024 06:00), Max: 98.7 (03 Mar 2024 20:32)  HR: 72 (04 Mar 2024 06:00) (67 - 76)  BP: 98/56 (04 Mar 2024 06:00) (98/56 - 104/62)  BP(mean): --  RR: 18 (04 Mar 2024 06:00) (17 - 18)  SpO2: 98% (04 Mar 2024 06:00) (95% - 98%)    Parameters below as of 04 Mar 2024 06:00  Patient On (Oxygen Delivery Method): room air        PHYSICAL EXAM:    General: in no acute distress  HEENT: MMM, conjunctiva and sclera clear  Gastrointestinal: + Ascites, soft non-tender non-distended; No rebound or guarding  Skin: Warm and dry. No obvious rash  Neuro: AOX3, no asterixis    LABS:                        8.7    4.52  )-----------( 138      ( 04 Mar 2024 05:30 )             27.0     03-04    133<L>  |  104  |  11  ----------------------------<  89  4.3   |  22  |  0.60    Ca    8.2<L>      04 Mar 2024 05:30  Phos  3.0     03-04  Mg     2.3     03-04    TPro  5.2<L>  /  Alb  3.3  /  TBili  0.9  /  DBili  x   /  AST  91<H>  /  ALT  56<H>  /  AlkPhos  311<H>  03-04    PT/INR - ( 04 Mar 2024 05:30 )   PT: 17.2 sec;   INR: 1.53          PTT - ( 04 Mar 2024 05:30 )  PTT:39.0 sec      Urinalysis Basic - ( 04 Mar 2024 05:30 )    Color: x / Appearance: x / SG: x / pH: x  Gluc: 89 mg/dL / Ketone: x  / Bili: x / Urobili: x   Blood: x / Protein: x / Nitrite: x   Leuk Esterase: x / RBC: x / WBC x   Sq Epi: x / Non Sq Epi: x / Bacteria: x                RADIOLOGY & ADDITIONAL STUDIES:  Reviewed

## 2024-03-04 NOTE — PROGRESS NOTE ADULT - ASSESSMENT
63M h/o cirrhosis (c/b recurrent ascites), ETOH use disorder (in remission), who p/w ascites. Hepatology consulted for cirrhosis management, and patient underwent TIPS and paracentesis this admission. Course complicated by SBP.     Echocardiogram:    1. Normal left and right ventricular size and systolic function.   2. Normal atria.   3. No significant valvular disease.   4. No pericardial effusion.   5. No prior echo is available for comparison.    CT abdomen/pelvis:   - Liver cirrhosis with signs of portal hypertension including splenomegaly, large volume ascites in all 4 quadrants and recanalized umbilical vein as well as moderate caliber esophageal and gastric varices.   - Ascites extending into the left hemiscrotum and a small umbilical hernia.  - No evidence of focal enhancing liver lesion to suspect hepatocellular carcinoma.    Underwent IR-guided paracentesis and TIPS procedure on 02/28. Had 7.2L removed with IR and was given 150 cc 25% albumin.  Ascites fluid positive for SBP, so started on ceftriaxone 2g IV q24. Stepped up to MICU for post-TIPS monitoring.   INR continues to down-trend s/p TIPS. PETH negative. No signs of asterixis on exam.     Recommendations:  - Continue ceftriaxone 2g IV q24 for 7 days total to treat for SBP (02/28 to 03/05)   - Ascites fluid culture NGTD  - Hold diuretics for now given SBP   - Continue lactulose 10 g PO BID and rifaximin 550 mg PO BID   - Monitor for signs of liver failure or hepatic encephalopathy s/p TIPS   - Continue pantoprazole 40 mg PO daily   - Trend CBC, CMP, and INR daily     Lilliam Gusman MD  Gastroenterology Fellow, PGY -6  Weekday Pager [phone]

## 2024-03-04 NOTE — DISCHARGE NOTE PROVIDER - HOSPITAL COURSE
#Discharge: do not delete    63M w PMHx HTN, remote history of alcohol use disorder, and cirrhosis, esophageal varices, and recurrent ascites requiring frequent paracenteses presenting with abdominal distension, s/p paracentesis with 7. & inpatient TIPS procedure, admitted to MICU for monitoring and now hemodynamically stable for stepdown to [facility]        Problem List/Main Diagnoses:     #SBP    Patient with Hx of cirrhosis presumed secondary to past alcohol use, with esophageal varices & recurrent ascites requiring multiple paracentesis in the past.   s/p Paracentesis (7.2 L removed) and TIPS on 2/28. Admitted to MICU post-procedure for monitoring. Has been hemodynamically stable through the stay except for mild tachycardia.  Fluid studies showing SBP (Total Nucleated Cells 774), Fluid cultures NGTD (from 2/28). s/p Lactulose standing -> PRN with >2BM daily and normal mental status.   Plan:  - Continue ceftriaxone 2g IV q24 for 7 days total to treat for SBP (3/5)    #Decompensated Liver Cirrhosis    Patient with Hx of cirrhosis presumed secondary to past alcohol use, with esophageal varices & recurrent ascites requiring multiple paracentesis (of approximately 22.5L within the last month). Prior workup showing AFP 2.2, KARYN 1:320, SMA 1:20 and negative Hepatitis panel. Is on [place] 50mg and Lasix 40mg at home.   s/p Paracentesis and TIPS on 2/28 (see above)  Plan:  - Holding diuretics for now   - Trend CBC, CMP, and INR daily   - F/u PETH which is pending  - Continue pantoprazole 40 mg PO daily  - SBP management as above.    #HTN  Patient on [place] 50mg and Lasix 40mg at home for his cirrhosis.   Plan:  - Will hold Diuretics given SBP.    New medications/therapies:   New lines/hardware:  Labs to be followed outpatient:   Exam to be followed outpatient:     Discharge plan: discharge to ______    Physical Exam Upon Discharge:  T(C): 37 (03-04-24 @ 06:00), Max: 37.1 (03-03-24 @ 20:32)  HR: 72 (03-04-24 @ 06:00) (72 - 76)  BP: 98/56 (03-04-24 @ 06:00) (98/56 - 104/62)  RR: 18 (03-04-24 @ 06:00) (18 - 18)  SpO2: 98% (03-04-24 @ 06:00) (97% - 98%)    General: NAD, laying in bed, speaking in full sentences  HEENT: head NC/AT, no conjunctival injection, EOMI, MMM  Neck: supple, no JVD  Cardio: RRR, +S1/S2, no M/R/G  Resp: lungs CTAB, no cough/wheezes/rales/rhonchi  Abdo: Distended abdomen that is soft, NT, +bowel sounds x4, no organomegaly or palpable mass    Extremities: WWP, no edema/cyanosis/clubbing   Vasc: 2+ radial and DP pulses b/l  Neuro: A&Ox3  Psych: speech non-pressured, thoughts goal-oriented  Skin: dry, intact, no visible jaundice   MSK: no joint swelling     63M w PMHx HTN, remote history of alcohol use disorder, and cirrhosis, esophageal varices, and recurrent ascites requiring frequent paracenteses presenting with abdominal distension, s/p paracentesis with 7. & inpatient TIPS procedure, admitted to MICU for monitoring and now hemodynamically stable for stepdown to [facility]      Problem List/Main Diagnoses:     #SBP    Patient with Hx of cirrhosis presumed secondary to past alcohol use, with esophageal varices & recurrent ascites requiring multiple paracentesis in the past.   s/p Paracentesis (7.2 L removed) and TIPS on 2/28. Admitted to MICU post-procedure for monitoring. Has been hemodynamically stable through the stay except for mild tachycardia.  Fluid studies showing SBP (Total Nucleated Cells 774), Fluid cultures NGTD (from 2/28). s/p Lactulose standing -> PRN with >2BM daily and normal mental status.   Plan:  - Continue ceftriaxone 2g IV q24 for 7 days total to treat for SBP (3/5)    #Decompensated Liver Cirrhosis    Patient with Hx of cirrhosis presumed secondary to past alcohol use, with esophageal varices & recurrent ascites requiring multiple paracentesis (of approximately 22.5L within the last month). Prior workup showing AFP 2.2, KARYN 1:320, SMA 1:20 and negative Hepatitis panel. Is on [place] 50mg and Lasix 40mg at home.   s/p Paracentesis and TIPS on 2/28 (see above)  Plan:  - Will resume diu  - Trend CBC, CMP, and INR daily  - F/u PETH which is pending  - Continue pantoprazole 40 mg PO daily  - SBP management as above.    #HTN  Patient on [place] 50mg and Lasix 40mg at home for his cirrhosis.   Plan:  - Will hold Diuretics given SBP.    New medications/therapies:   New lines/hardware:  Labs to be followed outpatient:   Exam to be followed outpatient:     Discharge plan: discharge to ______    Physical Exam Upon Discharge:  T(C): 37 (03-04-24 @ 06:00), Max: 37.1 (03-03-24 @ 20:32)  HR: 72 (03-04-24 @ 06:00) (72 - 76)  BP: 98/56 (03-04-24 @ 06:00) (98/56 - 104/62)  RR: 18 (03-04-24 @ 06:00) (18 - 18)  SpO2: 98% (03-04-24 @ 06:00) (97% - 98%)    General: NAD, laying in bed, speaking in full sentences  HEENT: head NC/AT, no conjunctival injection, EOMI, MMM  Neck: supple, no JVD  Cardio: RRR, +S1/S2, no M/R/G  Resp: lungs CTAB, no cough/wheezes/rales/rhonchi  Abdo: Distended abdomen that is soft, NT, +bowel sounds x4, no organomegaly or palpable mass    Extremities: WWP, no edema/cyanosis/clubbing   Vasc: 2+ radial and DP pulses b/l  Neuro: A&Ox3  Psych: speech non-pressured, thoughts goal-oriented  Skin: dry, intact, no visible jaundice   MSK: no joint swelling     63M w PMHx HTN, remote history of alcohol use disorder, and cirrhosis, esophageal varices, and recurrent ascites requiring frequent paracenteses presenting with abdominal distension, s/p paracentesis with 7. & inpatient TIPS procedure, admitted to MICU for monitoring and now hemodynamically stable for stepdown to [facility]      Problem List/Main Diagnoses:     #SBP    Patient with Hx of cirrhosis presumed secondary to past alcohol use, with esophageal varices & recurrent ascites requiring multiple paracentesis in the past.   s/p Paracentesis (7.2 L removed) and TIPS on 2/28. Admitted to MICU post-procedure for monitoring. Has been hemodynamically stable through the stay except for mild tachycardia.  Fluid studies showing SBP (Total Nucleated Cells 774), Fluid cultures NGTD (from 2/28). s/p Lactulose standing -> PRN with >2BM daily and normal mental status.   Finished with ceftriaxone 2g IV q24 for 7 days total to treat for SBP (3/5)  Plan:  - Will do Ciprofloxacin 500mg qd for 4 weeks for secondary prophylaxis     #Decompensated Liver Cirrhosis    Patient with Hx of cirrhosis presumed secondary to past alcohol use, with esophageal varices & recurrent ascites requiring multiple paracentesis (of approximately 22.5L within the last month). Prior workup showing AFP 2.2, KARYN 1:320, SMA 1:20 and negative Hepatitis panel. Is on [place] 50mg and Lasix 40mg at home.   s/p Paracentesis and TIPS on 2/28 (see above).   Plan:  - Will resume diuretics at half dose   - Trend CBC, CMP, and INR daily  - F/u PETH which is pending  - Continue pantoprazole 40 mg PO daily  - SBP management as above.    #HTN  Patient on [place] 50mg and Lasix 40mg at home for his cirrhosis.   Plan:  - D/c with half dose of diuretics until f/u with GI office     New medications/therapies: Spironolactone 25mg (Decreased from 50mg), Lasix 20mg (Decreased from 40mg), Ciprofloxacin 500mg   New lines/hardware: None   Labs to be followed outpatient: CBC, CMP, INR  Exam to be followed outpatient: Abdominal Exam     Discharge plan: discharge to Home     Physical Exam Upon Discharge:  T(C): 37 (03-04-24 @ 06:00), Max: 37.1 (03-03-24 @ 20:32)  HR: 72 (03-04-24 @ 06:00) (72 - 76)  BP: 98/56 (03-04-24 @ 06:00) (98/56 - 104/62)  RR: 18 (03-04-24 @ 06:00) (18 - 18)  SpO2: 98% (03-04-24 @ 06:00) (97% - 98%)    General: NAD, laying in bed, speaking in full sentences  HEENT: head NC/AT, no conjunctival injection, EOMI, MMM  Neck: supple, no JVD  Cardio: RRR, +S1/S2, no M/R/G  Resp: lungs CTAB, no cough/wheezes/rales/rhonchi  Abdo: Distended abdomen that is soft, NT, +bowel sounds x4, no organomegaly or palpable mass    Extremities: WWP, no edema/cyanosis/clubbing   Vasc: 2+ radial and DP pulses b/l  Neuro: A&Ox3  Psych: speech non-pressured, thoughts goal-oriented  Skin: dry, intact, no visible jaundice   MSK: no joint swelling

## 2024-03-04 NOTE — PROGRESS NOTE ADULT - PROBLEM SELECTOR PLAN 2
Patient with Hx of cirrhosis presumed secondary to past alcohol use, with esophageal varices & recurrent ascites requiring multiple paracentesis (of approximately 22.5L within the last month). Prior workup showing AFP 2.2, KARYN 1:320, SMA 1:20 and negative Hepatitis panel. Is on [place] 50mg and Lasix 40mg at home.   s/p Paracentesis and TIPS on 2/28 (see above)  Plan:  - Holding diuretics for now   - Trend CBC, CMP, and INR daily   - F/u PETH which is pending  - Continue pantoprazole 40 mg PO daily  - SBP management as above

## 2024-03-04 NOTE — PROGRESS NOTE ADULT - ATTENDING COMMENTS
continue with SBP treatment with rocephin last dose 3/5 x 7 days total   holding diuretics in the setting of sbp - will resume in 1-2 days   dvt pp x w lovenox   appreciate GI recs
Seen at bedside   Awake and alert   No new complaint   Drop in Hb but no sign of active GI bleeding   SBP on treatment   Mild ascites.   Low salt diet   No diuretic at this time  We will assess him as outpatient and decide whether diuretics need to be started again.   Stable from hepatology point of view
Patient seen and discussed with fellow plan as noted above
Patient with refractory ascites for TIPS  Post TIPS monitoring in ICU for 24 hours   Monitor for signs of liver failure or hepatic encephalopathy  Continue IV antibiotics for 48 hour post TIPS  Continue lactulose and Xifaxan   will follow up
HTN, ETOH induced cirrhosis with recurrent ascites s/p TIPS  physical as above  serial H/H to watch for bleeding but has stabilized  follow hemodynamics to watch for CHF  continue diuretics  observe in MICU
HTN, ETOH induced cirrhosis with recurrent ascites s/p TIPS  physical as above  serial H/H to watch for bleeding  follow hemodynamics to watch for CHF  continue diuretics  observe in MICU
Alcoholic cirrhosis with LVP s/p TIPS  physical as above  H/H stable  no sign of CHF
Patient reports feeling fine, denies abdominal pain, no SOB, no N/V. Aware of plan for para and TIPS tomorrow with IR. No other complaints or events reported  Labs, imaging reviewed    Patient with mild abdominal distension, no abdominal pain, no SOB. Admitted for TIPS with IR per Hepatology  DVT ppx, hold prior to procedure
Feeling fine, denies complaints. Plan for paracentesis with IR today, will follow-up with timing of TIPS  Labs reviewed
Patient without complaints, aware of plan for TIPS placement. Aware or risks and complications related to procedure, he is agreeable.  Labs reviewed    Started on Ceftriaxone, will be transferred to ICU post procedure for monitoring  Appreciate Hepatology
continue with SBP treatment with rocephin   holding diuretics in the setting of sbp   dvt pp x w lovenox   appreciate GI recs

## 2024-03-04 NOTE — DISCHARGE NOTE PROVIDER - ATTENDING DISCHARGE PHYSICAL EXAMINATION:
Physical exam:  GENERAL: NAD, lying in bed comfortably  HEAD:  Atraumatic, Normocephalic  EYES: EOMI, PERRLA, conjunctiva and sclera clear  ENT: Moist mucous membranes  NECK: Supple, No JVD  CHEST/LUNG: Clear to auscultation bilaterally; No rales, rhonchi, wheezing, or rubs.  HEART: Regular rate and rhythm; S1+ S2+   ABDOMEN: Bowel sounds present; Soft, Nontender, mildly distended with ascites   EXTREMITIES:  2+ Peripheral Pulses, brisk capillary refill. No clubbing, cyanosis, or edema  NERVOUS SYSTEM:  Alert & Oriented , speech clear   MSK: FROM all 4 extremities, full and equal strength  SKIN: No rashes or lesions

## 2024-03-04 NOTE — DISCHARGE NOTE PROVIDER - NSDCMRMEDTOKEN_GEN_ALL_CORE_FT
Lasix 40 mg oral tablet: 1 tab(s) orally once a day  omeprazole 20 mg oral delayed release tablet: 1 tab(s) orally once a day before breakfast  spironolactone 50 mg oral tablet: 1 tab(s) orally once a day   ciprofloxacin 500 mg oral tablet: 1 tab(s) orally once a day  lactulose 10 g/15 mL oral syrup: 15 milliliter(s) orally every 12 hours As needed constipation  Lasix 20 mg oral tablet: 1 tab(s) orally once a day  omeprazole 20 mg oral delayed release tablet: 1 tab(s) orally once a day before breakfast  rifAXIMin 550 mg oral tablet: 1 tab(s) orally 2 times a day  spironolactone 25 mg oral tablet: 1 tab(s) orally once a day   ciprofloxacin 500 mg oral tablet: 1 tab(s) orally once a day  lactulose 10 g/15 mL oral syrup: 10 milliliter(s) orally every 12 hours as needed for constipation  Lasix 20 mg oral tablet: 1 tab(s) orally once a day  omeprazole 20 mg oral delayed release tablet: 1 tab(s) orally once a day before breakfast  rifAXIMin 550 mg oral tablet: 1 tab(s) orally 2 times a day  spironolactone 25 mg oral tablet: 1 tab(s) orally once a day

## 2024-03-04 NOTE — PROGRESS NOTE ADULT - SUBJECTIVE AND OBJECTIVE BOX
Subjective:  NAEON. AVSS. Patient seen this AM, resting comfortably in bed without complaints.     Objective:  On exam pt alert and oriented to name, date, and location.    Abdomen is mildly distended but soft and non-tender to palpation.    Assessment/Plan:  62yo M w/ Hx HTN, remote history of alcohol use disorder, decompensated cirrhosis, esophageal varices, and recurrent ascites requiring frequent paracenteses, presenting with abdominal distension, admitted for paracentesis & inpatient TIPS procedure. S/p CT abd triple phase and echo. S/p TIPS and large volume paracentesis (given albumin) by IR on 2/28/24. Initial results of peritoneal fluid analysis c/f SBP. Fluid cx NGTD.     - Cont to monitor liver fxn and for signs of hepatic encephalopathy.

## 2024-03-04 NOTE — DISCHARGE NOTE PROVIDER - NSDCCPCAREPLAN_GEN_ALL_CORE_FT
PRINCIPAL DISCHARGE DIAGNOSIS  Diagnosis: Decompensated hepatic cirrhosis  Assessment and Plan of Treatment: Cirrosis es el término que se le da al daño permanente de las células del hígado. Los daños pueden ser causados ??por el alcohol, la hepatitis B, la hepatitis C o trastornos autoinmunes francis la hepatitis autoinmune, entre otros factores. Las agresiones continuas al hígado fede francis resultado la formación de tejido cicatricial colágeno que reemplaza el tejido previamente lesionado y se llama fibrosis hepática. La cirrosis es yokasta etapa avanzada de fibrosis hepática con la adición de vasculatura hepática distorsionada.  Los trastornos hemodinámicos y el síndrome circulatorio hiperdinámico (vasodilatación arterial periférica), principalmente circulación esplénica observada en un paciente descompensado, ponen al individuo en riesgo de desarrollar yokasta volemia ineficaz que resultará en yokasta hipoperfusión. Los riñones son los órganos más comúnmente afectados y que sufren hipoperfusión. Cuando hay yokasta volemia ineficaz, se activan los mecanismos de vasoconstricción y retención de agua y sodio. Estos sistemas incluyen el sistema renina angiotensina aldosterona (SRAA), el sistema nervioso simpático y la secreción de arginina-vasopresina. [place] puede contribuir a la retención renal de sodio y agua, que puede causar ascitis y síndrome hepatorrenal (SHR).  INSTRUCCIONES:     PRINCIPAL DISCHARGE DIAGNOSIS  Diagnosis: Decompensated hepatic cirrhosis  Assessment and Plan of Treatment: Cirrosis es el término que se le da al daño permanente de las células del hígado. Los daños pueden ser causados ??por el alcohol, la hepatitis B, la hepatitis C o trastornos autoinmunes francis la hepatitis autoinmune, entre otros factores. Las agresiones continuas al hígado fede francis resultado la formación de tejido cicatricial colágeno que reemplaza el tejido previamente lesionado y se llama fibrosis hepática. La cirrosis es yokasta etapa avanzada de fibrosis hepática con la adición de vasculatura hepática distorsionada.  Los trastornos hemodinámicos y el síndrome circulatorio hiperdinámico (vasodilatación arterial periférica), principalmente circulación esplénica observada en un paciente descompensado, ponen al individuo en riesgo de desarrollar yokasta volemia ineficaz que resultará en yokasta hipoperfusión. Los riñones son los órganos más comúnmente afectados y que sufren hipoperfusión. Cuando hay yokasta volemia ineficaz, se activan los mecanismos de vasoconstricción y retención de agua y sodio. Estos sistemas incluyen el sistema renina angiotensina aldosterona (SRAA), el sistema nervioso simpático y la secreción de arginina-vasopresina. [place] puede contribuir a la retención renal de sodio y agua, que puede causar ascitis y síndrome hepatorrenal (SHR).  INSTRUCCIONES:  -Empieze a rayo la Furosemida 20mg (No 40mg francis antes) hasta que rochelle seguimineto con el Dr Vizcarra (Gastroenterologo)   - Empieze a rayo Spironolactone 25mg (No 50mg francis antes) hasta que rochelle seguimiento con el Dr Vizcarra     PRINCIPAL DISCHARGE DIAGNOSIS  Diagnosis: Decompensated hepatic cirrhosis  Assessment and Plan of Treatment: Cirrosis es el término que se le da al daño permanente de las células del hígado. Los daños pueden ser causados por el alcohol, la hepatitis B, la hepatitis C o trastornos autoinmunes francis la hepatitis autoinmune, entre otros factores. Las agresiones continuas al hígado fede francis resultado la formación de tejido cicatricial colágeno que reemplaza el tejido previamente lesionado y se llama fibrosis hepática. La cirrosis es yokasta etapa avanzada de fibrosis hepática con la adición de vasculatura hepática distorsionada.  Los trastornos hemodinámicos y el síndrome circulatorio hiperdinámico (vasodilatación arterial periférica), principalmente circulación esplénica observada en un paciente descompensado, ponen al individuo en riesgo de desarrollar yokasta volemia ineficaz que resultará en yokasta hipoperfusión. Los riñones son los órganos más comúnmente afectados y que sufren hipoperfusión. Cuando hay yokasta volemia ineficaz, se activan los mecanismos de vasoconstricción y retención de agua y sodio. Estos sistemas incluyen el sistema renina angiotensina aldosterona (SRAA), el sistema nervioso simpático y la secreción de arginina-vasopresina. [place] puede contribuir a la retención renal de sodio y agua, que puede causar ascitis y síndrome hepatorrenal (SHR).  INSTRUCCIONES:  -Empieze a rayo la Furosemida 20mg (No 40mg francis antes) hasta que gray seguimineto con el Dr Vizcarra (Gastroenterologo)   - Empieze a rayo Spironolactone 25mg (No 50mg francis antes) hasta que gray seguimiento con el Dr Vizcarra  - Empieze a rayo Ciprofloxacine 500mg yokasta vez al darinel por 4 semanas  - Continue tomando Rifaximin todos los frances y Lactulosa cuando sea necessario para prevenir constipacion  - Gray seguimiento con el Dr Vizcarra el darinel 12 de marzo.

## 2024-03-04 NOTE — PROGRESS NOTE ADULT - SUBJECTIVE AND OBJECTIVE BOX
OVERNIGHT EVENTS: ALEJANDRO     SUBJECTIVE:  The patient was seen and examined at the bedside this AM. The patient states that he feels a bit of pressure in the abdomen today but has no pain. Has been having BM and urinating well. No other complaints.     VITAL SIGNS:  Vital Signs Last 24 Hrs  T(C): 37 (04 Mar 2024 06:00), Max: 37.1 (03 Mar 2024 20:32)  T(F): 98.6 (04 Mar 2024 06:00), Max: 98.7 (03 Mar 2024 20:32)  HR: 72 (04 Mar 2024 06:00) (72 - 76)  BP: 98/56 (04 Mar 2024 06:00) (98/56 - 104/62)  BP(mean): --  RR: 18 (04 Mar 2024 06:00) (18 - 18)  SpO2: 98% (04 Mar 2024 06:00) (97% - 98%)    Parameters below as of 04 Mar 2024 06:00  Patient On (Oxygen Delivery Method): room air        PHYSICAL EXAM:  General: NAD, Speaking in full sentences  HEENT: PERRL; EOMI; MMM  Neck: supple; no JVD  Cardiac: RRR; +S1/S2, murmur   Pulm: CTA B/L; no W/R/R  GI: soft, distended, nontender   Extremities: WWP; no edema, clubbing or cyanosis  Vasc: 2+ radial, DP pulses B/L  Neuro: AAOx3; no focal deficits    MEDICATIONS:  MEDICATIONS  (STANDING):  cefTRIAXone   IVPB 2000 milliGRAM(s) IV Intermittent every 24 hours  chlorhexidine 2% Cloths 1 Application(s) Topical <User Schedule>  enoxaparin Injectable 40 milliGRAM(s) SubCutaneous every 24 hours  influenza   Vaccine 0.5 milliLiter(s) IntraMuscular once  pantoprazole    Tablet 40 milliGRAM(s) Oral before breakfast  rifAXIMin 550 milliGRAM(s) Oral two times a day    MEDICATIONS  (PRN):  lactulose Syrup 10 Gram(s) Oral every 12 hours PRN constipation      ALLERGIES:  Allergies    No Known Allergies    Intolerances        LABS:                        8.7    4.52  )-----------( 138      ( 04 Mar 2024 05:30 )             27.0     03-04    133<L>  |  104  |  11  ----------------------------<  89  4.3   |  22  |  0.60    Ca    8.2<L>      04 Mar 2024 05:30  Phos  3.0     03-04  Mg     2.3     03-04    TPro  5.2<L>  /  Alb  3.3  /  TBili  0.9  /  DBili  x   /  AST  91<H>  /  ALT  56<H>  /  AlkPhos  311<H>  03-04    PT/INR - ( 04 Mar 2024 05:30 )   PT: 17.2 sec;   INR: 1.53          PTT - ( 04 Mar 2024 05:30 )  PTT:39.0 sec    RADIOLOGY & ADDITIONAL TESTS: Reviewed.

## 2024-03-04 NOTE — PROGRESS NOTE ADULT - ATTENDING SUPERVISION STATEMENT
Fellow
Resident
Fellow
Fellow
Resident
Resident/Student
Resident
Resident/Student
Resident

## 2024-03-04 NOTE — DISCHARGE NOTE PROVIDER - NSDCFUSCHEDAPPT_GEN_ALL_CORE_FT
Tyrell Vizcarra  [facility]  HEPATOLOGY [address] [place] [address]  Scheduled Appointment: 03/12/2024

## 2024-03-04 NOTE — PROGRESS NOTE ADULT - PROBLEM SELECTOR PLAN 1
Patient with Hx of cirrhosis presumed secondary to past alcohol use, with esophageal varices & recurrent ascites requiring multiple paracentesis in the past.   s/p Paracentesis (7.2 L removed) and TIPS on 2/28. Admitted to MICU post-procedure for monitoring. Has been hemodynamically stable through the stay except for mild tachycardia.   Fluid studies showing SBP (Total Nucleated Cells 774), Fluid cultures NGTD (from 2/28).   Plan:  - Continue ceftriaxone 2g IV q24 for 7 days total to treat for SBP (3/5)  - Lactulose 10 g PO BID PRN with (2-3 BM per day) and rifaximin 550 mg PO BID

## 2024-03-05 ENCOUNTER — TRANSCRIPTION ENCOUNTER (OUTPATIENT)
Age: 64
End: 2024-03-05

## 2024-03-05 VITALS
TEMPERATURE: 98 F | OXYGEN SATURATION: 97 % | HEART RATE: 82 BPM | SYSTOLIC BLOOD PRESSURE: 100 MMHG | DIASTOLIC BLOOD PRESSURE: 57 MMHG | RESPIRATION RATE: 18 BRPM

## 2024-03-05 LAB
ALBUMIN SERPL ELPH-MCNC: 3.4 G/DL — SIGNIFICANT CHANGE UP (ref 3.3–5)
ALP SERPL-CCNC: 363 U/L — HIGH (ref 40–120)
ALT FLD-CCNC: 60 U/L — HIGH (ref 10–45)
ANION GAP SERPL CALC-SCNC: 9 MMOL/L — SIGNIFICANT CHANGE UP (ref 5–17)
ANISOCYTOSIS BLD QL: SLIGHT — SIGNIFICANT CHANGE UP
APTT BLD: 40.1 SEC — HIGH (ref 24.5–35.6)
AST SERPL-CCNC: 94 U/L — HIGH (ref 10–40)
BASOPHILS # BLD AUTO: 0.08 K/UL — SIGNIFICANT CHANGE UP (ref 0–0.2)
BASOPHILS NFR BLD AUTO: 1.7 % — SIGNIFICANT CHANGE UP (ref 0–2)
BILIRUB SERPL-MCNC: 1.2 MG/DL — SIGNIFICANT CHANGE UP (ref 0.2–1.2)
BUN SERPL-MCNC: 11 MG/DL — SIGNIFICANT CHANGE UP (ref 7–23)
CALCIUM SERPL-MCNC: 8.3 MG/DL — LOW (ref 8.4–10.5)
CHLORIDE SERPL-SCNC: 104 MMOL/L — SIGNIFICANT CHANGE UP (ref 96–108)
CO2 SERPL-SCNC: 20 MMOL/L — LOW (ref 22–31)
CREAT SERPL-MCNC: 0.59 MG/DL — SIGNIFICANT CHANGE UP (ref 0.5–1.3)
EGFR: 110 ML/MIN/1.73M2 — SIGNIFICANT CHANGE UP
EOSINOPHIL # BLD AUTO: 0.09 K/UL — SIGNIFICANT CHANGE UP (ref 0–0.5)
EOSINOPHIL NFR BLD AUTO: 1.8 % — SIGNIFICANT CHANGE UP (ref 0–6)
GIANT PLATELETS BLD QL SMEAR: PRESENT — SIGNIFICANT CHANGE UP
GLUCOSE SERPL-MCNC: 88 MG/DL — SIGNIFICANT CHANGE UP (ref 70–99)
HCT VFR BLD CALC: 28.2 % — LOW (ref 39–50)
HGB BLD-MCNC: 8.9 G/DL — LOW (ref 13–17)
HYPOCHROMIA BLD QL: SLIGHT — SIGNIFICANT CHANGE UP
INR BLD: 1.37 — HIGH (ref 0.85–1.18)
LYMPHOCYTES # BLD AUTO: 0.33 K/UL — LOW (ref 1–3.3)
LYMPHOCYTES # BLD AUTO: 7 % — LOW (ref 13–44)
MAGNESIUM SERPL-MCNC: 2.3 MG/DL — SIGNIFICANT CHANGE UP (ref 1.6–2.6)
MANUAL SMEAR VERIFICATION: SIGNIFICANT CHANGE UP
MCHC RBC-ENTMCNC: 25.4 PG — LOW (ref 27–34)
MCHC RBC-ENTMCNC: 31.6 GM/DL — LOW (ref 32–36)
MCV RBC AUTO: 80.6 FL — SIGNIFICANT CHANGE UP (ref 80–100)
MICROCYTES BLD QL: SLIGHT — SIGNIFICANT CHANGE UP
MONOCYTES # BLD AUTO: 0.5 K/UL — SIGNIFICANT CHANGE UP (ref 0–0.9)
MONOCYTES NFR BLD AUTO: 10.4 % — SIGNIFICANT CHANGE UP (ref 2–14)
NEUTROPHILS # BLD AUTO: 3.78 K/UL — SIGNIFICANT CHANGE UP (ref 1.8–7.4)
NEUTROPHILS NFR BLD AUTO: 79.1 % — HIGH (ref 43–77)
OVALOCYTES BLD QL SMEAR: SLIGHT — SIGNIFICANT CHANGE UP
PHOSPHATE SERPL-MCNC: 2.8 MG/DL — SIGNIFICANT CHANGE UP (ref 2.5–4.5)
PLAT MORPH BLD: ABNORMAL
PLATELET # BLD AUTO: 138 K/UL — LOW (ref 150–400)
POLYCHROMASIA BLD QL SMEAR: SLIGHT — SIGNIFICANT CHANGE UP
POTASSIUM SERPL-MCNC: 4.4 MMOL/L — SIGNIFICANT CHANGE UP (ref 3.5–5.3)
POTASSIUM SERPL-SCNC: 4.4 MMOL/L — SIGNIFICANT CHANGE UP (ref 3.5–5.3)
PROT SERPL-MCNC: 5.6 G/DL — LOW (ref 6–8.3)
PROTHROM AB SERPL-ACNC: 15.5 SEC — HIGH (ref 9.5–13)
RBC # BLD: 3.5 M/UL — LOW (ref 4.2–5.8)
RBC # FLD: 20.4 % — HIGH (ref 10.3–14.5)
RBC BLD AUTO: ABNORMAL
SODIUM SERPL-SCNC: 133 MMOL/L — LOW (ref 135–145)
WBC # BLD: 4.78 K/UL — SIGNIFICANT CHANGE UP (ref 3.8–10.5)
WBC # FLD AUTO: 4.78 K/UL — SIGNIFICANT CHANGE UP (ref 3.8–10.5)

## 2024-03-05 PROCEDURE — 82042 OTHER SOURCE ALBUMIN QUAN EA: CPT

## 2024-03-05 PROCEDURE — 89051 BODY FLUID CELL COUNT: CPT

## 2024-03-05 PROCEDURE — 49083 ABD PARACENTESIS W/IMAGING: CPT

## 2024-03-05 PROCEDURE — C1729: CPT

## 2024-03-05 PROCEDURE — 80321 ALCOHOLS BIOMARKERS 1OR 2: CPT

## 2024-03-05 PROCEDURE — 80053 COMPREHEN METABOLIC PANEL: CPT

## 2024-03-05 PROCEDURE — 86708 HEPATITIS A ANTIBODY: CPT

## 2024-03-05 PROCEDURE — 83615 LACTATE (LD) (LDH) ENZYME: CPT

## 2024-03-05 PROCEDURE — 85025 COMPLETE CBC W/AUTO DIFF WBC: CPT

## 2024-03-05 PROCEDURE — 36415 COLL VENOUS BLD VENIPUNCTURE: CPT

## 2024-03-05 PROCEDURE — C1874: CPT

## 2024-03-05 PROCEDURE — 83690 ASSAY OF LIPASE: CPT

## 2024-03-05 PROCEDURE — 71045 X-RAY EXAM CHEST 1 VIEW: CPT

## 2024-03-05 PROCEDURE — C1725: CPT

## 2024-03-05 PROCEDURE — 83735 ASSAY OF MAGNESIUM: CPT

## 2024-03-05 PROCEDURE — C1894: CPT

## 2024-03-05 PROCEDURE — 85730 THROMBOPLASTIN TIME PARTIAL: CPT

## 2024-03-05 PROCEDURE — 87070 CULTURE OTHR SPECIMN AEROBIC: CPT

## 2024-03-05 PROCEDURE — 86803 HEPATITIS C AB TEST: CPT

## 2024-03-05 PROCEDURE — C8923: CPT

## 2024-03-05 PROCEDURE — 86704 HEP B CORE ANTIBODY TOTAL: CPT

## 2024-03-05 PROCEDURE — 87075 CULTR BACTERIA EXCEPT BLOOD: CPT

## 2024-03-05 PROCEDURE — 82248 BILIRUBIN DIRECT: CPT

## 2024-03-05 PROCEDURE — 86706 HEP B SURFACE ANTIBODY: CPT

## 2024-03-05 PROCEDURE — 86850 RBC ANTIBODY SCREEN: CPT

## 2024-03-05 PROCEDURE — P9047: CPT

## 2024-03-05 PROCEDURE — 86901 BLOOD TYPING SEROLOGIC RH(D): CPT

## 2024-03-05 PROCEDURE — 99239 HOSP IP/OBS DSCHRG MGMT >30: CPT | Mod: GC

## 2024-03-05 PROCEDURE — 87205 SMEAR GRAM STAIN: CPT

## 2024-03-05 PROCEDURE — 74177 CT ABD & PELVIS W/CONTRAST: CPT | Mod: MC

## 2024-03-05 PROCEDURE — 85610 PROTHROMBIN TIME: CPT

## 2024-03-05 PROCEDURE — 84100 ASSAY OF PHOSPHORUS: CPT

## 2024-03-05 PROCEDURE — C1887: CPT

## 2024-03-05 PROCEDURE — 82945 GLUCOSE OTHER FLUID: CPT

## 2024-03-05 PROCEDURE — 82247 BILIRUBIN TOTAL: CPT

## 2024-03-05 PROCEDURE — 84157 ASSAY OF PROTEIN OTHER: CPT

## 2024-03-05 PROCEDURE — 37182 INSERT HEPATIC SHUNT (TIPS): CPT

## 2024-03-05 PROCEDURE — 85027 COMPLETE CBC AUTOMATED: CPT

## 2024-03-05 PROCEDURE — C1757: CPT

## 2024-03-05 PROCEDURE — 86900 BLOOD TYPING SEROLOGIC ABO: CPT

## 2024-03-05 PROCEDURE — 99285 EMERGENCY DEPT VISIT HI MDM: CPT

## 2024-03-05 PROCEDURE — C1769: CPT

## 2024-03-05 PROCEDURE — 93005 ELECTROCARDIOGRAM TRACING: CPT

## 2024-03-05 RX ORDER — LACTULOSE 10 G/15ML
15 SOLUTION ORAL
Qty: 0 | Refills: 0 | DISCHARGE
Start: 2024-03-05

## 2024-03-05 RX ORDER — RIFAXIMIN 200 MG/1
1 TABLET ORAL
Qty: 60 | Refills: 0
Start: 2024-03-05 | End: 2024-04-03

## 2024-03-05 RX ORDER — CEFTRIAXONE 500 MG/1
2000 INJECTION, POWDER, FOR SOLUTION INTRAMUSCULAR; INTRAVENOUS ONCE
Refills: 0 | Status: COMPLETED | OUTPATIENT
Start: 2024-03-05 | End: 2024-03-05

## 2024-03-05 RX ORDER — FUROSEMIDE 40 MG
1 TABLET ORAL
Qty: 7 | Refills: 0
Start: 2024-03-05 | End: 2024-03-11

## 2024-03-05 RX ORDER — FUROSEMIDE 40 MG
1 TABLET ORAL
Refills: 0 | DISCHARGE

## 2024-03-05 RX ORDER — LACTULOSE 10 G/15ML
10 SOLUTION ORAL
Qty: 600 | Refills: 0
Start: 2024-03-05 | End: 2024-04-03

## 2024-03-05 RX ORDER — RIFAXIMIN 200 MG/1
1 TABLET ORAL
Qty: 0 | Refills: 0 | DISCHARGE
Start: 2024-03-05

## 2024-03-05 RX ORDER — SPIRONOLACTONE 25 MG/1
1 TABLET, FILM COATED ORAL
Refills: 0 | DISCHARGE

## 2024-03-05 RX ORDER — CIPROFLOXACIN LACTATE 400MG/40ML
1 VIAL (ML) INTRAVENOUS
Qty: 28 | Refills: 0
Start: 2024-03-05 | End: 2024-04-01

## 2024-03-05 RX ORDER — SPIRONOLACTONE 25 MG/1
1 TABLET, FILM COATED ORAL
Qty: 7 | Refills: 0
Start: 2024-03-05 | End: 2024-03-11

## 2024-03-05 RX ADMIN — PANTOPRAZOLE SODIUM 40 MILLIGRAM(S): 20 TABLET, DELAYED RELEASE ORAL at 06:23

## 2024-03-05 RX ADMIN — Medication 62.5 MILLIMOLE(S): at 11:39

## 2024-03-05 RX ADMIN — ENOXAPARIN SODIUM 40 MILLIGRAM(S): 100 INJECTION SUBCUTANEOUS at 11:39

## 2024-03-05 RX ADMIN — CEFTRIAXONE 100 MILLIGRAM(S): 500 INJECTION, POWDER, FOR SOLUTION INTRAMUSCULAR; INTRAVENOUS at 15:30

## 2024-03-05 NOTE — PROGRESS NOTE ADULT - SUBJECTIVE AND OBJECTIVE BOX
Subjective:  NAEON. AVSS. Patient seen this AM, resting comfortably in bed without complaints.     Objective:  On exam pt is AAOx3.     Abdomen is mildly distended but soft and non-tender to palpation. No evidence of asterixis.     Assessment/Plan:  62yo M w/ Hx HTN, remote history of alcohol use disorder, decompensated cirrhosis, esophageal varices, and recurrent ascites requiring frequent paracenteses s/p TIPS and large volume paracentesis by IR on 2/28/24.     - Cont to monitor liver fxn and for signs of hepatic encephalopathy.

## 2024-03-05 NOTE — CHART NOTE - NSCHARTNOTEFT_GEN_A_CORE
Admitting Diagnosis:   Patient is a 63y old  Male who presents with a chief complaint of Ascites (05 Mar 2024 11:04)      PAST MEDICAL & SURGICAL HISTORY:  Cirrhosis of liver with ascites      HTN (hypertension)          Current Nutrition Order: DASH/TLC       PO Intake: Good (%) [ x ]  Fair (50-75%) [   ] Poor (<25%) [   ]    GI Issues: denies n/v/d/c/abd pain, noted with abdominal distention, last BM 3/5 per EMR    Skin Integrity: surgical incision to RUQ, generalized trace edema    Labs:   03-05    133<L>  |  104  |  11  ----------------------------<  88  4.4   |  20<L>  |  0.59    Ca    8.3<L>      05 Mar 2024 05:30  Phos  2.8     03-05  Mg     2.3     03-05    TPro  5.6<L>  /  Alb  3.4  /  TBili  1.2  /  DBili  x   /  AST  94<H>  /  ALT  60<H>  /  AlkPhos  363<H>  03-05    CAPILLARY BLOOD GLUCOSE          Medications:  MEDICATIONS  (STANDING):  cefTRIAXone   IVPB 2000 milliGRAM(s) IV Intermittent once  chlorhexidine 2% Cloths 1 Application(s) Topical <User Schedule>  enoxaparin Injectable 40 milliGRAM(s) SubCutaneous every 24 hours  influenza   Vaccine 0.5 milliLiter(s) IntraMuscular once  pantoprazole    Tablet 40 milliGRAM(s) Oral before breakfast  rifAXIMin 550 milliGRAM(s) Oral two times a day    MEDICATIONS  (PRN):  lactulose Syrup 10 Gram(s) Oral every 12 hours PRN constipation      Anthropometrics:  Dosing height: 63in   Dosing weight: 144#  BMI: 25.5  IBW: 124#  %IBW: 111%    Weight Change: no new weights since admit     Nutrition Focused Physical Exam: Completed [ x ]  Not Pertinent [   ]  >>See dietitian nutrition risk notification 2/29     Estimated energy needs:   Weight used for calculations	current weight   Estimated Energy Needs Weight (lbs)	144 lb  Estimated Energy Needs Weight (kg)	65.3 kg  Estimated Energy Needs From (vahid/kg)	30   Estimated Energy Needs To (vahid/kg)	35   Estimated Energy Needs Calculated From (vahid/kg)	1959   Estimated Energy Needs Calculated To (vahid/kg)	2285   Weight used for calculations	current weight   Estimated Protein Needs Weight (lbs)	144 lb  Estimated Protein Needs Weight (kg)	65.3 kg  Estimated Protein Needs From (g/kg)	1.2   Estimated Protein Needs To (g/kg)	1.5   Estimated Protein Needs Calculated From (g/kg)	78.36   Estimated Protein Needs Calculated To (g/kg)	97.95   Other Calculations	Based on Standards of Care pt within % IBW (111%) thus actual body weight used for all calculations. Needs adjusted for cirrhosis and malnutrition. Fluid recs per team.     Subjective:   63M w PMHx HTN, remote history of alcohol use disorder, and cirrhosis, esophageal varices, and recurrent ascites requiring frequent paracenteses, presenting with abdominal distension. He was admitted to [facility] with similar complaint, where he had 10.7L abdominal fluid drained 2/21 (then complicated by hypotension, improved after administration of 4 bags of albumin). He previously had 11.7L removed on 2/8. Pt states that he recently saw Dr. Vizcarra at his outpatient hepatology clinic, who advised him to present to ED if he develops recurrent ascites, as pt will likely require TIPS procedure but is unable to do undergo said procedure in outpatient setting due to social circumstances.    Pt seen at bedside for follow up assessment. Continues on DASH/TLC diet. Endorses improved appetite and PO intake s/p TIPS procedure. Consumed 100% of breakfast which consisted of eggs, potatoes, pancakes, and fruit. Denies n/v/d/c/abd pain, noted with abdominal distention, last BM 3/5 per EMR. Labs: Na 133, LFTs elevated, glucose 88-89 x 24 hours. Meds: abx, lactulose, protonix. RD to remain available for additional nutrition interventions as needed.     Previous Nutrition Diagnosis: Severe protein calorie malnutrition in context of chronic illness related to inadequate PO intake as evidenced by <75% nutrition needs >7 days, severe muscle/fat wasting     Active [ x- improving ]  Resolved [   ]    Goal: Pt to meet at least 75% of nutritional needs consistently, Reduce/resolve signs and symptoms of protein calorie malnutrition     Recommendations:  1. Continue current diet order  2. Encourage and monitor PO intake, honor preferences as able   3. Monitor labs, wt trends, GI function, and skin integrity   4. Pain and bowel regimen per team   5. RD to remain available for additional nutrition interventions and diet edu as needed       Risk Level: High [   ] Moderate [ x ] Low [   ]

## 2024-03-05 NOTE — PROGRESS NOTE ADULT - PROBLEM SELECTOR PLAN 5
hgb trendin gbtw 8-10   fu repeat cbc at noon
hgb trendin gbtw 8-10   fu repeat cbc at noon
F: PO  E: Replete as needed  N: DASH diet  DVT PPx: Lovenox 40mg   GI PPx: Omeprazole 20mg qd  Dispo: RMF  Code: FULL CODE.
F: PO  E: Replete as needed  N: DASH diet  DVT PPx: Lovenox 40mg   GI PPx: Omeprazole 20mg qd  Dispo: RMF  Code: FULL CODE.

## 2024-03-05 NOTE — PROGRESS NOTE ADULT - SUBJECTIVE AND OBJECTIVE BOX
GASTROENTEROLOGY PROGRESS NOTE  Patient seen and examined at bedside.  QUINCY. No complaints. Denies abdominal pain, confusion.   Today will complete 7d course of IV abx for SBP    PERTINENT REVIEW OF SYSTEMS:  CONSTITUTIONAL: No weakness, fevers or chills  HEENT: No visual changes; No vertigo or throat pain   GASTROINTESTINAL: As above.  NEUROLOGICAL: No numbness or weakness  SKIN: No itching, burning, rashes, or lesions     Allergies    No Known Allergies    Intolerances      MEDICATIONS:  MEDICATIONS  (STANDING):  cefTRIAXone   IVPB 2000 milliGRAM(s) IV Intermittent once  chlorhexidine 2% Cloths 1 Application(s) Topical <User Schedule>  enoxaparin Injectable 40 milliGRAM(s) SubCutaneous every 24 hours  influenza   Vaccine 0.5 milliLiter(s) IntraMuscular once  pantoprazole    Tablet 40 milliGRAM(s) Oral before breakfast  rifAXIMin 550 milliGRAM(s) Oral two times a day    MEDICATIONS  (PRN):  lactulose Syrup 10 Gram(s) Oral every 12 hours PRN constipation    Vital Signs Last 24 Hrs  T(C): 36.9 (05 Mar 2024 12:16), Max: 37.3 (05 Mar 2024 06:30)  T(F): 98.5 (05 Mar 2024 12:16), Max: 99.1 (05 Mar 2024 06:30)  HR: 82 (05 Mar 2024 12:16) (70 - 82)  BP: 100/57 (05 Mar 2024 12:16) (100/57 - 101/63)  BP(mean): --  RR: 18 (05 Mar 2024 12:16) (16 - 18)  SpO2: 97% (05 Mar 2024 12:16) (97% - 98%)    Parameters below as of 05 Mar 2024 12:16  Patient On (Oxygen Delivery Method): room air        PHYSICAL EXAM:    General: in no acute distress  HEENT: MMM, conjunctiva and sclera clear  Gastrointestinal: + Ascites, soft non-tender non-distended; No rebound or guarding  Skin: Warm and dry. No obvious rash  Neuro: AO, no asterixis    LABS:                        8.9    4.78  )-----------( 138      ( 05 Mar 2024 05:30 )             28.2     03-05    133<L>  |  104  |  11  ----------------------------<  88  4.4   |  20<L>  |  0.59    Ca    8.3<L>      05 Mar 2024 05:30  Phos  2.8     03-05  Mg     2.3     03-05    TPro  5.6<L>  /  Alb  3.4  /  TBili  1.2  /  DBili  x   /  AST  94<H>  /  ALT  60<H>  /  AlkPhos  363<H>  03-05    PT/INR - ( 05 Mar 2024 05:30 )   PT: 15.5 sec;   INR: 1.37          PTT - ( 05 Mar 2024 05:30 )  PTT:40.1 sec      Urinalysis Basic - ( 05 Mar 2024 05:30 )    Color: x / Appearance: x / SG: x / pH: x  Gluc: 88 mg/dL / Ketone: x  / Bili: x / Urobili: x   Blood: x / Protein: x / Nitrite: x   Leuk Esterase: x / RBC: x / WBC x   Sq Epi: x / Non Sq Epi: x / Bacteria: x                RADIOLOGY & ADDITIONAL STUDIES:  Reviewed

## 2024-03-05 NOTE — PROGRESS NOTE ADULT - PROBLEM SELECTOR PROBLEM 1
Decompensated hepatic cirrhosis
Decompensated hepatic cirrhosis
SBP (spontaneous bacterial peritonitis)
SBP (spontaneous bacterial peritonitis)
Decompensated hepatic cirrhosis
SBP (spontaneous bacterial peritonitis)

## 2024-03-05 NOTE — DISCHARGE NOTE NURSING/CASE MANAGEMENT/SOCIAL WORK - PATIENT PORTAL LINK FT
You can access the FollowMyHealth Patient Portal offered by [facility] by registering at the following website: [email or web address]. By joining [facility]’s FollowMyHealth portal, you will also be able to view your health information using other applications (apps) compatible with our system.

## 2024-03-05 NOTE — PROGRESS NOTE ADULT - PROBLEM SELECTOR PLAN 2
Patient with Hx of cirrhosis presumed secondary to past alcohol use, with esophageal varices & recurrent ascites requiring multiple paracentesis (of approximately 22.5L within the last month). Prior workup showing AFP 2.2, KARYN 1:320, SMA 1:20 and negative Hepatitis panel. Is on Morgan 50mg and Lasix 40mg at home.   s/p Paracentesis and TIPS on 2/28 (see above)  Plan:  - Holding diuretics for now   - Trend CBC, CMP, and INR daily   - F/u PETH which is pending  - Continue pantoprazole 40 mg PO daily  - SBP management as above

## 2024-03-05 NOTE — PROGRESS NOTE ADULT - ASSESSMENT
63M h/o cirrhosis (c/b recurrent ascites), ETOH use disorder (in remission), who p/w ascites. Hepatology consulted for cirrhosis management, and patient underwent TIPS and paracentesis this admission. Course complicated by SBP.     Echocardiogram:    1. Normal left and right ventricular size and systolic function.   2. Normal atria.   3. No significant valvular disease.   4. No pericardial effusion.   5. No prior echo is available for comparison.    CT abdomen/pelvis:   - Liver cirrhosis with signs of portal hypertension including splenomegaly, large volume ascites in all 4 quadrants and recanalized umbilical vein as well as moderate caliber esophageal and gastric varices.   - Ascites extending into the left hemiscrotum and a small umbilical hernia.  - No evidence of focal enhancing liver lesion to suspect hepatocellular carcinoma.    Underwent IR-guided paracentesis and TIPS procedure on 02/28. Had 7.2L removed with IR and was given 150 cc 25% albumin.  Ascites fluid positive for SBP, so started on ceftriaxone 2g IV q24. Stepped up to MICU for post-TIPS monitoring.   INR continues to down-trend s/p TIPS. PETH negative. No signs of asterixis on exam.     Recommendations:  - Complete 7 day course of ceftriaxone 2g IV q24 to treat for SBP (02/28 to 03/05)   - Tomorrow start Ciprofloxacin 500mg daily for SBP PPX  - Ascites fluid culture No growth  - Hold diuretics for now given SBP, to be resumed outpatient  - Consider therapeutic paracentesis prior to discharge  - Continue lactulose 10 g PO BID and rifaximin 550 mg PO BID  for HE  - Monitor for signs of liver failure or hepatic encephalopathy s/p TIPS   - Continue pantoprazole 40 mg PO daily   - Trend CBC, CMP, and INR daily   - Outpatient hepatology follow up arranged with Dr. Zackery Gusman MD  Gastroenterology Fellow, PGY -6  Weekday Pager [phone]

## 2024-03-05 NOTE — PROGRESS NOTE ADULT - PROBLEM SELECTOR PROBLEM 4
Prophylactic measure
Anemia of chronic disease
Prophylactic measure
Anemia of chronic disease

## 2024-03-05 NOTE — PROGRESS NOTE ADULT - NUTRITIONAL ASSESSMENT
This patient has been assessed with a concern for Malnutrition and has been determined to have a diagnosis/diagnoses of Severe protein-calorie malnutrition.    This patient is being managed with:   Diet DASH/TLC-  Sodium & Cholesterol Restricted  Entered: Feb 25 2024  7:48PM  

## 2024-03-12 ENCOUNTER — APPOINTMENT (OUTPATIENT)
Dept: HEPATOLOGY | Facility: CLINIC | Age: 64
End: 2024-03-12
Payer: MEDICAID

## 2024-03-12 VITALS
SYSTOLIC BLOOD PRESSURE: 120 MMHG | WEIGHT: 148 LBS | DIASTOLIC BLOOD PRESSURE: 76 MMHG | HEIGHT: 63 IN | BODY MASS INDEX: 26.22 KG/M2

## 2024-03-12 PROBLEM — I10 ESSENTIAL (PRIMARY) HYPERTENSION: Chronic | Status: ACTIVE | Noted: 2024-02-25

## 2024-03-12 PROBLEM — K74.60 UNSPECIFIED CIRRHOSIS OF LIVER: Chronic | Status: ACTIVE | Noted: 2024-02-25

## 2024-03-12 PROCEDURE — 99213 OFFICE O/P EST LOW 20 MIN: CPT

## 2024-03-13 ENCOUNTER — RESULT REVIEW (OUTPATIENT)
Age: 64
End: 2024-03-13

## 2024-03-18 RX ORDER — FUROSEMIDE 40 MG/1
40 TABLET ORAL DAILY
Qty: 30 | Refills: 3 | Status: ACTIVE | COMMUNITY
Start: 2024-03-18 | End: 1900-01-01

## 2024-03-18 RX ORDER — SPIRONOLACTONE 50 MG/1
50 TABLET ORAL DAILY
Qty: 30 | Refills: 2 | Status: DISCONTINUED | COMMUNITY
Start: 2023-11-30 | End: 2024-03-18

## 2024-03-18 NOTE — HISTORY OF PRESENT ILLNESS
[de-identified] : 63 yr David M hx HTN and newly discovered Cirrhosis and portal htn with ascites during at hosp [facility] for abdl distention, underwent LVP with 8300 cc removed on 23. Followed at [facility] with hx of alcohol use, no previous hx ascites here with 2 weeks of abd distention and sob.  Immigrant, no insurance MELD 10 [ABO A+]  Accompanied by Son Dandy MEDICATIONS lisinopril 5mg lasix 40 mg  SOCIAL Lori, 29 yrs in [place], not an [place] , 3 children TOBACCO - never ETOH - drank 14-16 beer on weekends for 7 yrs, reduced 3-4 beers weekends a year ago, stopeed drinking 3 mos ago DRUGS/HERBALS - denies  SURG no  FAMILY HX Father, liver issues, emdprses father daniella kaiser,  sister  pancreatic cancer at age41  STUDIES CT of the Abdomen and Pelvis  FINDINGS: LOWER CHEST: Cardiomegaly. Mild elevation right hemidiaphragm. Coronary artery calcifications. Bibasilar dependent atelectasis right greater than left. Distal esophageal varices and small hiatal hernia.  LIVER: Micronodular contour. Recanalized umbilical vein. Distal esophageal and left gastric varices. BILE DUCTS: Normal caliber. GALLBLADDER: Within normal limits. SPLEEN: Splenomegaly 14.2 cm AP diameter. Accessory spleen. PANCREAS: Within normal limits. ADRENALS: Within normal limits. KIDNEYS/URETERS: Within normal limits. BLADDER: Within normal limits. REPRODUCTIVE ORGANS: Normal size prostate. BOWEL: No bowel obstruction. Appendix is normal. Wall thickening from the cecum to the distal transverse colon. Colonic diverticulosis. Small hiatal hernia. PERITONEUM: Large amount of abdominal and pelvic ascites. Mesenteric and omental edema. VESSELS: Left gastric and distal esophageal varices. Recanalized umbilical vein. RETROPERITONEUM/LYMPH NODES: No lymphadenopathy. ABDOMINAL WALL: Small umbilical hernia containing fat and ascites. Small right and large left inguinal hernias containing fat and ascites. Subcutaneous edema. BONES: Straightening of normal lordosis. Moderate degenerative changes of the spine.  VS stable Mild muscle wasting Large ascites Clear lungs Regular heart sounds No edema  Awake and alert [FreeTextEntry1] : Since the last visit, patient underwent TIPS with no major complication. Tap before the procedure showed SBP but he had no sign of sepsis.  He developed large ascites again No encephalopathy VS stable Clear lungs  Regular heart sounds no murmur Moderate ascites  No edema  No HE

## 2024-03-18 NOTE — ASSESSMENT
[FreeTextEntry1] : AScites S/P TIPS Has moderate to large ascites. Needs paracentesis  Likely needs a TIPS revision  He stated that he was taking the diuretics. I will confirm the dose of medications with his daughter.

## 2024-03-28 ENCOUNTER — RESULT REVIEW (OUTPATIENT)
Age: 64
End: 2024-03-28

## 2024-04-02 ENCOUNTER — APPOINTMENT (OUTPATIENT)
Dept: HEPATOLOGY | Facility: CLINIC | Age: 64
End: 2024-04-02
Payer: SELF-PAY

## 2024-04-02 VITALS
WEIGHT: 130 LBS | BODY MASS INDEX: 23.04 KG/M2 | HEART RATE: 78 BPM | SYSTOLIC BLOOD PRESSURE: 148 MMHG | DIASTOLIC BLOOD PRESSURE: 92 MMHG | HEIGHT: 63 IN

## 2024-04-02 PROCEDURE — 99213 OFFICE O/P EST LOW 20 MIN: CPT

## 2024-04-02 RX ORDER — CIPROFLOXACIN HYDROCHLORIDE 500 MG/1
500 TABLET, FILM COATED ORAL DAILY
Qty: 90 | Refills: 2 | Status: ACTIVE | COMMUNITY
Start: 2024-04-02 | End: 1900-01-01

## 2024-04-02 RX ORDER — OMEPRAZOLE 20 MG/1
20 CAPSULE, DELAYED RELEASE ORAL
Refills: 0 | Status: ACTIVE | COMMUNITY

## 2024-04-02 RX ORDER — SPIRONOLACTONE 50 MG/1
50 TABLET, FILM COATED ORAL
Refills: 0 | Status: ACTIVE | COMMUNITY

## 2024-04-02 NOTE — PHYSICAL EXAM
[General Appearance - Alert] : alert [General Appearance - In No Acute Distress] : in no acute distress [Sclera] : the sclera and conjunctiva were normal [Extraocular Movements] : extraocular movements were intact [Respiration, Rhythm And Depth] : normal respiratory rhythm and effort [Exaggerated Use Of Accessory Muscles For Inspiration] : no accessory muscle use [Auscultation Breath Sounds / Voice Sounds] : lungs were clear to auscultation bilaterally [Heart Rate And Rhythm] : heart rate was normal and rhythm regular [Heart Sounds] : normal S1 and S2 [Bowel Sounds] : normal bowel sounds [Abdomen Soft] : soft [Abnormal Walk] : normal gait [Abdomen Tenderness] : non-tender [Musculoskeletal - Swelling] : no joint swelling seen [Skin Color & Pigmentation] : normal skin color and pigmentation [Skin Lesions] : no skin lesions [] : no rash [No Focal Deficits] : no focal deficits [Oriented To Time, Place, And Person] : oriented to person, place, and time [Impaired Insight] : insight and judgment were intact [Affect] : the affect was normal [Mood] : the mood was normal [FreeTextEntry1] : Some Temporal wasting

## 2024-04-02 NOTE — HISTORY OF PRESENT ILLNESS
[FreeTextEntry1] : Mr. Dickson is a 64y/o [place] gentleman with a medical history of sHTN, newly diagnosed liver cirrhosis (Nov, 2023) presumed due to alcohol use d/o who is here for follow up. His liver cirrhosis is complicated by ascites that has required frequent LVPs and so he finally underwent TIPS placement on 2/28 at [facility]. Unable to find pressure gradient measurements on inpatient documents. His hospitalization was complicated by SBP for which he was treated.   He is alone at today's visit.  is used for this encounter. He reports feeling well overall but has trouble with memory issues which is new for him. Moving his bowels once or twice a day but not taking his lactulose. Last paracentesis was last week and only 3 L removed.   Does not have his list of meds with him today. But will call with it.  Tells me he has an EGD scheduled for soon to follow up on his varices.  Has a good appetite Energy and sleep are ok.    CURRENT MEDICATIONS Lasix 40 mg Spironolactone 50mg Omeprazole 40mg Ciprofloxacin 500

## 2024-04-02 NOTE — ASSESSMENT
[FreeTextEntry1] : Mr. Dickson is a 62y/o [place] gentleman with a medical history of sHTN, newly diagnosed liver cirrhosis (Nov, 2023) presumed due to alcohol use d/o who is here for follow up. His liver cirrhosis is complicated by ascites that has required LVP frequently. Now s/p TIPS on 2/28.     1. Ascites: s/p TIPS but with initial quick reaccumulation of his ascites. Appears to be slowing down. Only 3L removed at last paracentesis and he says he can go the entire two weeks without urgently needed a tap. Remains on Lasix and spironolactone. I emphasized the importance of low Na diet. Has developed mild asterixis on exam. Emphasized the importance of lactulose. Will give his TIPS some more time to kick in before considering reversal.  2. History of EV with no EVB per daughter. S/p banding and planned for repeat EGD soon. Likely does not need EGD given TIPS placement although, i have no access to his portal gradient.   3. Mild HE on exam today. He has not been taking his lactulose as he should. I advised that he take it religiously for a goal of 2-3 BMS daily.  4. Denies cravings. Advised that if it ever becomes an issue, he should please let us know as we have medications that can help. PETH today.  5. BP elevated today. Denies high salt inatke. May need to be started on an anti-Hypertensive (coreg).  RTC in 2-3 weeks.

## 2024-04-13 ENCOUNTER — TRANSCRIPTION ENCOUNTER (OUTPATIENT)
Age: 64
End: 2024-04-13

## 2024-04-16 ENCOUNTER — APPOINTMENT (OUTPATIENT)
Dept: HEPATOLOGY | Facility: CLINIC | Age: 64
End: 2024-04-16
Payer: SELF-PAY

## 2024-04-16 VITALS — HEIGHT: 63 IN | DIASTOLIC BLOOD PRESSURE: 70 MMHG | HEART RATE: 75 BPM | SYSTOLIC BLOOD PRESSURE: 110 MMHG

## 2024-04-16 VITALS — WEIGHT: 131 LBS | BODY MASS INDEX: 23.21 KG/M2

## 2024-04-16 DIAGNOSIS — R11.0 NAUSEA: ICD-10-CM

## 2024-04-16 PROCEDURE — 99214 OFFICE O/P EST MOD 30 MIN: CPT

## 2024-04-16 RX ORDER — ONDANSETRON 4 MG/1
4 TABLET ORAL
Qty: 20 | Refills: 0 | Status: ACTIVE | COMMUNITY
Start: 2024-04-16 | End: 1900-01-01

## 2024-04-16 RX ORDER — LACTULOSE 10 G/15ML
10 SOLUTION ORAL
Qty: 900 | Refills: 6 | Status: ACTIVE | COMMUNITY
Start: 2024-04-16 | End: 1900-01-01

## 2024-04-16 RX ORDER — SPIRONOLACTONE 25 MG/1
25 TABLET ORAL
Qty: 30 | Refills: 3 | Status: ACTIVE | COMMUNITY
Start: 2024-03-18 | End: 1900-01-01

## 2024-04-16 NOTE — ASSESSMENT
[FreeTextEntry1] : Mr. Dickson is a 64y/o Citizen of [place] gentleman with a medical history of sHTN, newly diagnosed liver cirrhosis (Nov, 2023) presumed due to alcohol use d/o who is here for follow up. His liver cirrhosis is complicated by ascites that has required LVP frequently. Now s/p TIPS on 2/28.    1. Ascites: s/p TIPS but with initial quick reaccumulation of his ascites. Appears to be slowing down. Last paracentesis was 3 weeks ago and only 3L removed and at today's visit, he has no overt ascites on exam. Remains on Lasix 40 and spironolactone 50mg. Will cut down slowly on these meds as able. Reduced spironolactone to 25mg today. I emphasized the importance of low Na diet. Has mild HE per report but exam today shows no clear signs of HE.  2. History of EV with no EVB per daughter. S/p banding and planned for repeat EGD soon. Likely does not need EGD given TIPS placement although, i have no access to his portal gradient.  3. NO clear signs of HE on exam today as compared to two weeks ago. He still has not been taking his lactulose as he should. I advised that he take it religiously for a goal of 2-3 BMS daily to avoid HE. He verbalized understanding.  4. History of SBP: continues on SBP ppx - ciproflox.  5. Denies cravings. Advised that if it ever becomes an issue, he should please let us know as we have medications that can help.  6. BP normal today. Emphasized low salt intake.  7. Nausea: I suspect related to ciprofloxacin. WIll trial zofran for now. If no improvement, will consider switching to Bactrim.  8. Advised to hold off on returning to work as a  given his mild HE.   RTC in 4 weeks.

## 2024-04-16 NOTE — HISTORY OF PRESENT ILLNESS
[FreeTextEntry1] :  ID number: [id number]  Mr. Dickson is a 62y/o [place] gentleman with a medical history of sHTN, liver cirrhosis (Nov, 2023) presumed due to alcohol use d/o who is here for follow up. His liver cirrhosis is complicated by ascites that has required frequent LVPs and so he finally underwent TIPS placement on 2/28 at [facility]. Unable to find pressure gradient measurements on inpatient documents. His hospitalization was complicated by SBP for which he was treated and started on cipro for SBP ppx.  He is accompanied by his son to today's visit. Admitted for two days last week for nausea and vomiting. Feeling better but still with some nausea. Nausea medications help. No abdominal pain, diarrhea or changes in his bowel movement. Not specifically associated with any foods or activities. Memory/confusion is about the same but he is still not taking his lactulose and not having enough BMs Moving his bowels once or twice a day but not taking his lactulose. Last paracentesis was 3 weeks ago and he does not feel distended. Eager to go back to work as a .   CURRENT MEDICATIONS Lasix 40 mg Spironolactone 50mg Omeprazole 20mg Ciprofloxacin 500 Lactulose Iron

## 2024-04-16 NOTE — PHYSICAL EXAM
[General Appearance - Alert] : alert [General Appearance - In No Acute Distress] : in no acute distress [Sclera] : the sclera and conjunctiva were normal [Extraocular Movements] : extraocular movements were intact [Respiration, Rhythm And Depth] : normal respiratory rhythm and effort [Exaggerated Use Of Accessory Muscles For Inspiration] : no accessory muscle use [Auscultation Breath Sounds / Voice Sounds] : lungs were clear to auscultation bilaterally [Heart Rate And Rhythm] : heart rate was normal and rhythm regular [Heart Sounds] : normal S1 and S2 [Bowel Sounds] : normal bowel sounds [Abdomen Soft] : soft [Abdomen Tenderness] : non-tender [Abnormal Walk] : normal gait [Musculoskeletal - Swelling] : no joint swelling seen [Skin Color & Pigmentation] : normal skin color and pigmentation [] : no rash [Skin Lesions] : no skin lesions [No Focal Deficits] : no focal deficits [Oriented To Time, Place, And Person] : oriented to person, place, and time [Impaired Insight] : insight and judgment were intact [Affect] : the affect was normal [Mood] : the mood was normal [Scleral Icterus] : No Scleral Icterus [Asterixis] : no asterixis observed [Jaundice] : No jaundice [Palmar Erythema] : no Palmar Erythema [FreeTextEntry7] : No asterixis. Fully oriented  [FreeTextEntry1] : Small periumbilical hernia

## 2024-04-29 ENCOUNTER — TRANSCRIPTION ENCOUNTER (OUTPATIENT)
Age: 64
End: 2024-04-29

## 2024-05-13 ENCOUNTER — LABORATORY RESULT (OUTPATIENT)
Age: 64
End: 2024-05-13

## 2024-05-21 ENCOUNTER — APPOINTMENT (OUTPATIENT)
Dept: HEPATOLOGY | Facility: CLINIC | Age: 64
End: 2024-05-21
Payer: SELF-PAY

## 2024-05-21 DIAGNOSIS — G93.40 ENCEPHALOPATHY, UNSPECIFIED: ICD-10-CM

## 2024-05-21 DIAGNOSIS — K65.2 SPONTANEOUS BACTERIAL PERITONITIS: ICD-10-CM

## 2024-05-21 DIAGNOSIS — K74.60 UNSPECIFIED CIRRHOSIS OF LIVER: ICD-10-CM

## 2024-05-21 DIAGNOSIS — R18.8 OTHER ASCITES: ICD-10-CM

## 2024-05-21 PROCEDURE — 99213 OFFICE O/P EST LOW 20 MIN: CPT

## 2024-05-21 RX ORDER — LACTULOSE 10 G/15ML
20 SOLUTION ORAL
Qty: 1 | Refills: 5 | Status: ACTIVE | COMMUNITY
Start: 2024-04-29 | End: 1900-01-01

## 2024-05-21 NOTE — HISTORY OF PRESENT ILLNESS
[FreeTextEntry1] :  ID number: [id number] Dickson is a 62y/o Citizen of [place] gentleman with a medical history of sHTN, liver cirrhosis (Nov, 2023) presumed due to alcohol use d/o who is here for follow up. His liver cirrhosis is complicated by ascites that has required frequent LVPs and so he finally underwent TIPS placement on 2/28 at [facility]. Unable to find pressure gradient measurements on inpatient documents. His hospitalization was complicated by SBP for which he was treated and started on cipro for SBP ppx.  He is accompanied by his son to today's visit. Was admitted in April for HE again but since discharge has done well on the TID dosing. He feels well and denies any complaints. Eating and Sleeping well. Most recent blood work reviewed with pt and his son. Bili is up from baseline, it is unclear what it was while he was hospitalized in April.  He reports 2-3 BMs daily. Is compliant with his meds although does not have his list of medications with him today. Nausea is resolved. Last paracentesis was in late MArch and he does not feel distended. Living with his family in [place], [place].    CURRENT MEDICATIONS as of last visit. Pt did not bring his list of meds today. Lasix 40 mg Spironolactone 50mg Omeprazole 20mg Ciprofloxacin 500 Lactulose Iron

## 2024-05-21 NOTE — PHYSICAL EXAM
[Scleral Icterus] : No Scleral Icterus [Asterixis] : no asterixis observed [Jaundice] : No jaundice [Palmar Erythema] : no Palmar Erythema [FreeTextEntry7] : No asterixis. Fully oriented  [General Appearance - Alert] : alert [General Appearance - In No Acute Distress] : in no acute distress [Respiration, Rhythm And Depth] : normal respiratory rhythm and effort [Exaggerated Use Of Accessory Muscles For Inspiration] : no accessory muscle use [Auscultation Breath Sounds / Voice Sounds] : lungs were clear to auscultation bilaterally [Heart Rate And Rhythm] : heart rate was normal and rhythm regular [Heart Sounds] : normal S1 and S2 [Bowel Sounds] : normal bowel sounds [Abdomen Soft] : soft [Abdomen Tenderness] : non-tender [FreeTextEntry1] : Small periumbilical hernia [Abnormal Walk] : normal gait [Musculoskeletal - Swelling] : no joint swelling seen [Skin Color & Pigmentation] : normal skin color and pigmentation [] : no rash [Skin Lesions] : no skin lesions [Oriented To Time, Place, And Person] : oriented to person, place, and time [Impaired Insight] : insight and judgment were intact [Affect] : the affect was normal [Mood] : the mood was normal

## 2024-05-21 NOTE — ASSESSMENT
[FreeTextEntry1] : Mr. Dickson is a 62y/o [place] gentleman with a medical history of sHTN, newly diagnosed liver cirrhosis (Nov, 2023) presumed due to alcohol use d/o who is here for follow up. His liver cirrhosis is complicated by ascites that has required LVP frequently. Now s/p TIPS on 2/28.    1. Ascites: s/p TIPS with good response Last paracentesis was in late MArch and only 3L removed and at today's visit, he has no overt ascites on exam. Appears to still be on Lasix 40 and spironolactone 25mg. Will cut down slowly on these meds as able. I re-emphasized the importance of low Na diet. Has mild HE but ascites is very well controlled with TIPS and I suspect we can keep his HE under control with lactulose. Will obtain US for TIPS eval in July.  2. History of EV with no EVB per daughter. S/p banding previously. Likely does not need EGD given TIPS placement although, i have no access to his portal gradient. We discussed holding off for now since he has no insurance. Since his ascites is well controlled s/p TIPS the presumption is that his portal pressures are down which should be Proctective against EVB.  3. NO clear signs of HE on exam today. I stressed the importance of taking his lactulose religiously for a goal of 2-3 BMS daily to avoid HE. He verbalized understanding.  4. History of SBP: continues on SBP ppx - ciproflox.  5. Denies cravings. Advised that if it ever becomes an issue, he should please let us know as we have medications that can help. PETH negative  6. Emphasized low salt intake.  7. Nausea: Resolved  8. Advised to hold off on returning to work as a  given his history of HE.  RTC in 8 weeks. Will repeat labs at that visit especially given slight uptrend in his bilirubin.

## 2024-05-23 LAB
ALBUMIN SERPL ELPH-MCNC: 3.4 G/DL
ALP BLD-CCNC: 245 U/L
ALPHA-1-FETOPROTEIN-L3: NORMAL %
ALPHA-1-FETOPROTEIN: 2.6 NG/ML
ALT SERPL-CCNC: 35 U/L
ANION GAP SERPL CALC-SCNC: 12 MMOL/L
AST SERPL-CCNC: 62 U/L
BILIRUB SERPL-MCNC: 1.9 MG/DL
BUN SERPL-MCNC: 7 MG/DL
CALCIUM SERPL-MCNC: 8.7 MG/DL
CHLORIDE SERPL-SCNC: 102 MMOL/L
CO2 SERPL-SCNC: 20 MMOL/L
CREAT SERPL-MCNC: 0.67 MG/DL
EGFR: 106 ML/MIN/1.73M2
GLUCOSE SERPL-MCNC: 203 MG/DL
HCT VFR BLD CALC: 35.8 %
HGB BLD-MCNC: 12.4 G/DL
INR PPP: 1.21 RATIO
MCHC RBC-ENTMCNC: 31.5 PG
MCHC RBC-ENTMCNC: 34.6 GM/DL
MCV RBC AUTO: 90.9 FL
PETH 16:0/18:1: NEGATIVE NG/ML
PETH 16:0/18:2: NEGATIVE NG/ML
PETH COMMENTS: NORMAL
PLATELET # BLD AUTO: 126 K/UL
POTASSIUM SERPL-SCNC: 4.1 MMOL/L
PROT SERPL-MCNC: 7.1 G/DL
PT BLD: 13.6 SEC
RBC # BLD: 3.94 M/UL
RBC # FLD: 20.4 %
SODIUM SERPL-SCNC: 134 MMOL/L
WBC # FLD AUTO: 3.29 K/UL

## 2024-05-28 ENCOUNTER — TRANSCRIPTION ENCOUNTER (OUTPATIENT)
Age: 64
End: 2024-05-28

## 2024-07-16 ENCOUNTER — APPOINTMENT (OUTPATIENT)
Dept: HEPATOLOGY | Facility: CLINIC | Age: 64
End: 2024-07-16
Payer: SELF-PAY

## 2024-07-16 VITALS
BODY MASS INDEX: 25.87 KG/M2 | SYSTOLIC BLOOD PRESSURE: 137 MMHG | DIASTOLIC BLOOD PRESSURE: 72 MMHG | HEART RATE: 60 BPM | HEIGHT: 63 IN | WEIGHT: 146 LBS

## 2024-07-16 DIAGNOSIS — K74.60 UNSPECIFIED CIRRHOSIS OF LIVER: ICD-10-CM

## 2024-07-16 DIAGNOSIS — Z95.828 PRESENCE OF OTHER VASCULAR IMPLANTS AND GRAFTS: ICD-10-CM

## 2024-07-16 DIAGNOSIS — R11.0 NAUSEA: ICD-10-CM

## 2024-07-16 LAB
INR PPP: 1.22 RATIO
PT BLD: 13.7 SEC

## 2024-07-16 PROCEDURE — 99214 OFFICE O/P EST MOD 30 MIN: CPT

## 2024-07-16 RX ORDER — OMEPRAZOLE 40 MG/1
40 CAPSULE, DELAYED RELEASE ORAL
Qty: 30 | Refills: 5 | Status: ACTIVE | COMMUNITY
Start: 2024-07-16 | End: 1900-01-01

## 2024-07-16 RX ORDER — FUROSEMIDE 40 MG/1
40 TABLET ORAL
Qty: 30 | Refills: 3 | Status: ACTIVE | COMMUNITY
Start: 2024-07-16 | End: 1900-01-01

## 2024-07-16 RX ORDER — SPIRONOLACTONE 50 MG/1
50 TABLET ORAL
Qty: 30 | Refills: 3 | Status: ACTIVE | COMMUNITY
Start: 2024-07-16 | End: 1900-01-01

## 2024-07-17 LAB
ALBUMIN SERPL ELPH-MCNC: 3.6 G/DL
ALP BLD-CCNC: 174 U/L
ALT SERPL-CCNC: 26 U/L
ANION GAP SERPL CALC-SCNC: 15 MMOL/L
AST SERPL-CCNC: 51 U/L
BILIRUB SERPL-MCNC: 2 MG/DL
BUN SERPL-MCNC: 8 MG/DL
CALCIUM SERPL-MCNC: 8.6 MG/DL
CHLORIDE SERPL-SCNC: 107 MMOL/L
CO2 SERPL-SCNC: 20 MMOL/L
CREAT SERPL-MCNC: 0.76 MG/DL
EGFR: 102 ML/MIN/1.73M2
GLUCOSE SERPL-MCNC: 122 MG/DL
HCT VFR BLD CALC: 36.2 %
HGB BLD-MCNC: 12.7 G/DL
MCHC RBC-ENTMCNC: 35.1 GM/DL
MCHC RBC-ENTMCNC: 35.6 PG
MCV RBC AUTO: 101.4 FL
PLATELET # BLD AUTO: 134 K/UL
POTASSIUM SERPL-SCNC: 3.5 MMOL/L
PROT SERPL-MCNC: 7.5 G/DL
RBC # BLD: 3.57 M/UL
RBC # FLD: 14.7 %
SODIUM SERPL-SCNC: 141 MMOL/L
WBC # FLD AUTO: 4 K/UL

## 2024-07-22 LAB
PETH 16:0/18:1: NEGATIVE NG/ML
PETH 16:0/18:2: NEGATIVE NG/ML
PETH COMMENTS: NORMAL

## 2024-09-17 ENCOUNTER — LABORATORY RESULT (OUTPATIENT)
Age: 64
End: 2024-09-17

## 2024-09-17 ENCOUNTER — APPOINTMENT (OUTPATIENT)
Dept: HEPATOLOGY | Facility: CLINIC | Age: 64
End: 2024-09-17
Payer: SELF-PAY

## 2024-09-17 VITALS
BODY MASS INDEX: 27.82 KG/M2 | HEIGHT: 63 IN | WEIGHT: 157 LBS | SYSTOLIC BLOOD PRESSURE: 129 MMHG | DIASTOLIC BLOOD PRESSURE: 76 MMHG | HEART RATE: 76 BPM

## 2024-09-17 DIAGNOSIS — G93.40 ENCEPHALOPATHY, UNSPECIFIED: ICD-10-CM

## 2024-09-17 DIAGNOSIS — K65.2 SPONTANEOUS BACTERIAL PERITONITIS: ICD-10-CM

## 2024-09-17 DIAGNOSIS — Z86.19 PERSONAL HISTORY OF OTHER INFECTIOUS AND PARASITIC DISEASES: ICD-10-CM

## 2024-09-17 DIAGNOSIS — Z95.828 PRESENCE OF OTHER VASCULAR IMPLANTS AND GRAFTS: ICD-10-CM

## 2024-09-17 DIAGNOSIS — K74.60 UNSPECIFIED CIRRHOSIS OF LIVER: ICD-10-CM

## 2024-09-17 DIAGNOSIS — Z87.898 PERSONAL HISTORY OF OTHER SPECIFIED CONDITIONS: ICD-10-CM

## 2024-09-17 PROCEDURE — 99214 OFFICE O/P EST MOD 30 MIN: CPT

## 2024-09-17 RX ORDER — FUROSEMIDE 20 MG/1
20 TABLET ORAL DAILY
Qty: 30 | Refills: 3 | Status: ACTIVE | COMMUNITY
Start: 2024-09-17 | End: 1900-01-01

## 2024-09-17 RX ORDER — SPIRONOLACTONE 25 MG/1
25 TABLET ORAL DAILY
Qty: 30 | Refills: 3 | Status: ACTIVE | COMMUNITY
Start: 2024-09-17 | End: 1900-01-01

## 2024-09-18 PROBLEM — Z87.898 HISTORY OF ASCITES: Status: ACTIVE | Noted: 2024-09-18

## 2024-09-18 PROBLEM — Z86.19 HISTORY OF SPONTANEOUS BACTERIAL PERITONITIS: Status: ACTIVE | Noted: 2024-09-18

## 2024-09-18 PROBLEM — Z87.898 HISTORY OF ASCITES: Status: RESOLVED | Noted: 2023-11-30 | Resolved: 2024-09-18

## 2024-09-18 NOTE — HISTORY OF PRESENT ILLNESS
[FreeTextEntry1] :  ID number [id number]  Mr. Dickson is a 62y/o [place] gentleman with a medical history of sHTN, liver cirrhosis (Nov, 2023) presumed due to alcohol use d/o who is here for follow up. His liver cirrhosis is complicated by refractory ascites and so he finally underwent TIPS placement on 2/28/2024 at [facility]. Unable to find pressure gradient measurements on inpatient documents. His hospitalization was complicated by SBP for which he was treated and started on cipro for SBP ppx. Now off Cipro since ascites is no longer present.  Last paracentesis was in late March with 3L removed. Last admission in May 2024 for HE but since discharge has done well on the TID dosing.  He came in via a cab today because son is at work. He reports right leg feeling "tired and heavy", denies swelling or pain. He also has occasional nausea, but no vomiting or abd pain. Overall he is doing well -- eating well and gaining weight.  Currently reports 3 BMs daily while on lactulose. Is compliant with his meds - as below. Denies having new onset encephalopathy.  Denies ETOH use or cravings.  Recently had a doppler done with finding of patent TIPS.   Abd doppler (8/22/2024):  Patent hepatic vasculature with normal direction of flow. No evidence of biliiary ductal dilatation. CBD measures 0.3cm.  TIPS intact, with appropriate velocities. Cystic nodule abutting the spleen as measured above.  No ascites present.    CURRENT MEDICATIONS Lasix 40 mg Spironolactone 50mg Omeprazole 20mg Lactulose   Living with his family in [place], [place].

## 2024-09-18 NOTE — END OF VISIT
[Time Spent: ___ minutes] : I have spent [unfilled] minutes of time on the encounter which excludes teaching and separately reported services. [FreeTextEntry3] :  I, Dr. Rai, personally performed the evaluation and management (E/M) services for this established patient who presents today with (a) new problem(s)/exacerbation of (an) existing condition(s). That E/M includes conducting the clinically appropriate interval history &/or exam, assessing all new/exacerbated conditions, and establishing a new plan of care. Today, my TIMO, [employer], was here to observe my evaluation and management service for this new problem/exacerbated condition and follow the plan of care established by me going forward

## 2024-09-18 NOTE — ASSESSMENT
[FreeTextEntry1] : Mr. Dickson is a 64y/o [place] gentleman with a medical history of sHTN, newly diagnosed liver cirrhosis (Nov, 2023) presumed due to alcohol use d/o who is here for follow up. His liver cirrhosis is complicated by ascites that has required LVP frequently. Now s/p TIPS on 2/28.  1. Ascites: s/p TIPS with good response Last paracentesis was in late MArch and only 3L removed. At today's visit, he has no overt ascites on exam. Still on Lasix 40 and spironolactone 50mg. Recently had doppler US showing intact TIPS, no ascites.  -Will reduce diuretics to lasix 20mg and spironolactone 25mg and potential discontinue it if he continues to remain euvolemic. - I re-emphasized the importance of low Na diet. - Continue aggressive lactulose for HE ppx.   2. History of EV with no EVB per daughter. S/p banding previously. Likely does not need EGD given TIPS placement although, I have no access to his portal gradient. We discussed holding off for now. Since his ascites is well controlled s/p TIPS the presumption is that his portal pressures are down which should be Proctective against EVB.  3. NO clear signs of HE on exam today. I stressed the importance of taking his lactulose religiously for a goal of 2-3 BMS daily to avoid HE. He verbalized understanding. His last admission for HE was in May, 2024.  4. History of SBP: off of ciprofloxacin -- no longer needed as his ascites is well controlled.   5. Denies ETOH use or cravings. Advised that if it ever becomes an issue, he should please let us know as we have medications that can help. PETH from 7/2024 negative, will repeat PETH today.    6. Slight upward trend in bilirubin. Sclera appears slightly yellow today. Concern for worsening liver function s/p TIPS. Will repeat bloodwork today and fractionate bilirubin. If it continues to trend upwards we may have to consider extensive work up and possible OLT evaluation. We will need to figure out his insurance status as he currently only has emergency medicaid at this time.  8. HCC screening: repeat AFP with today's bloodwork. Plan for repeat abd US in 2/2025.   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   RTC in 3mo

## 2024-09-18 NOTE — END OF VISIT
[Time Spent: ___ minutes] : I have spent [unfilled] minutes of time on the encounter which excludes teaching and separately reported services. [FreeTextEntry3] :  I, Dr. Rai, personally performed the evaluation and management (E/M) services for this established patient who presents today with (a) new problem(s)/exacerbation of (an) existing condition(s). That E/M includes conducting the clinically appropriate interval history &/or exam, assessing all new/exacerbated conditions, and establishing a new plan of care. Today, my TIMO, [employer], was here to observe my evaluation and management service for this new problem/exacerbated condition and follow the plan of care established by me going forward

## 2024-09-18 NOTE — HISTORY OF PRESENT ILLNESS
[FreeTextEntry1] :  ID number [id number]  Mr. Dickson is a 62y/o Citizen of [place] gentleman with a medical history of sHTN, liver cirrhosis (Nov, 2023) presumed due to alcohol use d/o who is here for follow up. His liver cirrhosis is complicated by refractory ascites and so he finally underwent TIPS placement on 2/28/2024 at [facility]. Unable to find pressure gradient measurements on inpatient documents. His hospitalization was complicated by SBP for which he was treated and started on cipro for SBP ppx. Now off Cipro since ascites is no longer present.  Last paracentesis was in late March with 3L removed. Last admission in May 2024 for HE but since discharge has done well on the TID dosing.  He came in via a cab today because son is at work. He reports right leg feeling "tired and heavy", denies swelling or pain. He also has occasional nausea, but no vomiting or abd pain. Overall he is doing well -- eating well and gaining weight.  Currently reports 3 BMs daily while on lactulose. Is compliant with his meds - as below. Denies having new onset encephalopathy.  Denies ETOH use or cravings.  Recently had a doppler done with finding of patent TIPS.   Abd doppler (8/22/2024):  Patent hepatic vasculature with normal direction of flow. No evidence of biliiary ductal dilatation. CBD measures 0.3cm.  TIPS intact, with appropriate velocities. Cystic nodule abutting the spleen as measured above.  No ascites present.    CURRENT MEDICATIONS Lasix 40 mg Spironolactone 50mg Omeprazole 20mg Lactulose   Living with his family in [place], [place].

## 2024-09-18 NOTE — PHYSICAL EXAM
[General Appearance - Alert] : alert [General Appearance - In No Acute Distress] : in no acute distress [Heart Rate And Rhythm] : heart rate was normal and rhythm regular [Heart Sounds] : normal S1 and S2 [Bowel Sounds] : normal bowel sounds [Abdomen Soft] : soft [Abdomen Tenderness] : non-tender [Abnormal Walk] : normal gait [Musculoskeletal - Swelling] : no joint swelling seen [Skin Color & Pigmentation] : normal skin color and pigmentation [] : no rash [Skin Lesions] : no skin lesions [Oriented To Time, Place, And Person] : oriented to person, place, and time [Impaired Insight] : insight and judgment were intact [Affect] : the affect was normal [Scleral Icterus] : Scleral Icterus noted [Abdominal  Ascites] : no ascites [Asterixis] : no asterixis observed [Jaundice] : No jaundice [Palmar Erythema] : no Palmar Erythema [FreeTextEntry7] : No asterixis. Fully oriented  [FreeTextEntry1] : Small periumbilical hernia

## 2024-09-18 NOTE — ASSESSMENT
[FreeTextEntry1] : Mr. Dickson is a 64y/o [place] gentleman with a medical history of sHTN, newly diagnosed liver cirrhosis (Nov, 2023) presumed due to alcohol use d/o who is here for follow up. His liver cirrhosis is complicated by ascites that has required LVP frequently. Now s/p TIPS on 2/28.  1. Ascites: s/p TIPS with good response Last paracentesis was in late MArch and only 3L removed. At today's visit, he has no overt ascites on exam. Still on Lasix 40 and spironolactone 50mg. Recently had doppler US showing intact TIPS, no ascites.  -Will reduce diuretics to lasix 20mg and spironolactone 25mg and potential discontinue it if he continues to remain euvolemic. - I re-emphasized the importance of low Na diet. - Continue aggressive lactulose for HE ppx.   2. History of EV with no EVB per daughter. S/p banding previously. Likely does not need EGD given TIPS placement although, I have no access to his portal gradient. We discussed holding off for now. Since his ascites is well controlled s/p TIPS the presumption is that his portal pressures are down which should be Proctective against EVB.  3. NO clear signs of HE on exam today. I stressed the importance of taking his lactulose religiously for a goal of 2-3 BMS daily to avoid HE. He verbalized understanding. His last admission for HE was in May, 2024.  4. History of SBP: off of ciprofloxacin -- no longer needed as his ascites is well controlled.   5. Denies ETOH use or cravings. Advised that if it ever becomes an issue, he should please let us know as we have medications that can help. PETH from 7/2024 negative, will repeat PETH today.    6. Slight upward trend in bilirubin. Sclera appears slightly yellow today. Concern for worsening liver function s/p TIPS. Will repeat bloodwork today and fractionate bilirubin. If it continues to trend upwards we may have to consider extensive work up and possible OLT evaluation. We will need to figure out his insurance status as he currently only has emergency medicaid at this time.  8. HCC screening: repeat AFP with today's bloodwork. Plan for repeat abd US in 2/2025.   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   RTC in 3mo

## 2024-09-23 LAB
ALBUMIN SERPL ELPH-MCNC: 3.6 G/DL
ALP BLD-CCNC: 195 U/L
ALPHA-1-FETOPROTEIN-L3: 7.2 %
ALPHA-1-FETOPROTEIN: 4.8 NG/ML
ALT SERPL-CCNC: 21 U/L
ANION GAP SERPL CALC-SCNC: 10 MMOL/L
AST SERPL-CCNC: 48 U/L
BILIRUB DIRECT SERPL-MCNC: 0.6 MG/DL
BILIRUB INDIRECT SERPL-MCNC: 1.6 MG/DL
BILIRUB SERPL-MCNC: 2.3 MG/DL
BUN SERPL-MCNC: 6 MG/DL
CALCIUM SERPL-MCNC: 8.6 MG/DL
CHLORIDE SERPL-SCNC: 104 MMOL/L
CO2 SERPL-SCNC: 22 MMOL/L
CREAT SERPL-MCNC: 0.75 MG/DL
EGFR: 103 ML/MIN/1.73M2
GLUCOSE SERPL-MCNC: 160 MG/DL
HCT VFR BLD CALC: 37.5 %
HGB BLD-MCNC: 13.3 G/DL
INR PPP: 1.27 RATIO
MCHC RBC-ENTMCNC: 35 PG
MCHC RBC-ENTMCNC: 35.5 GM/DL
MCV RBC AUTO: 98.7 FL
PETH 16:0/18:1: NEGATIVE NG/ML
PETH 16:0/18:2: NEGATIVE NG/ML
PETH COMMENTS: NORMAL
PLATELET # BLD AUTO: 143 K/UL
POTASSIUM SERPL-SCNC: 4.2 MMOL/L
PROT SERPL-MCNC: 7.6 G/DL
PT BLD: 14.3 SEC
RBC # BLD: 3.8 M/UL
RBC # FLD: 14.2 %
SODIUM SERPL-SCNC: 136 MMOL/L
WBC # FLD AUTO: 4.78 K/UL

## 2024-10-14 ENCOUNTER — LABORATORY RESULT (OUTPATIENT)
Age: 64
End: 2024-10-14

## 2024-11-05 ENCOUNTER — APPOINTMENT (OUTPATIENT)
Dept: HEPATOLOGY | Facility: CLINIC | Age: 64
End: 2024-11-05
Payer: MEDICAID

## 2024-11-05 VITALS
DIASTOLIC BLOOD PRESSURE: 84 MMHG | SYSTOLIC BLOOD PRESSURE: 135 MMHG | HEART RATE: 62 BPM | BODY MASS INDEX: 29.32 KG/M2 | HEIGHT: 62.2 IN | WEIGHT: 161.38 LBS | OXYGEN SATURATION: 97 %

## 2024-11-05 DIAGNOSIS — K74.60 UNSPECIFIED CIRRHOSIS OF LIVER: ICD-10-CM

## 2024-11-05 DIAGNOSIS — R17 UNSPECIFIED JAUNDICE: ICD-10-CM

## 2024-11-05 DIAGNOSIS — Z95.828 PRESENCE OF OTHER VASCULAR IMPLANTS AND GRAFTS: ICD-10-CM

## 2024-11-05 PROCEDURE — 99214 OFFICE O/P EST MOD 30 MIN: CPT

## 2024-11-07 PROBLEM — R17 JAUNDICE: Status: ACTIVE | Noted: 2024-11-07

## 2024-12-02 ENCOUNTER — LABORATORY RESULT (OUTPATIENT)
Age: 64
End: 2024-12-02

## 2025-01-07 ENCOUNTER — APPOINTMENT (OUTPATIENT)
Dept: HEPATOLOGY | Facility: CLINIC | Age: 65
End: 2025-01-07
Payer: MEDICAID

## 2025-01-07 VITALS
DIASTOLIC BLOOD PRESSURE: 89 MMHG | HEART RATE: 67 BPM | BODY MASS INDEX: 31.41 KG/M2 | SYSTOLIC BLOOD PRESSURE: 144 MMHG | WEIGHT: 160 LBS | HEIGHT: 60 IN

## 2025-01-07 VITALS — BODY MASS INDEX: 32.69 KG/M2 | WEIGHT: 167.4 LBS

## 2025-01-07 DIAGNOSIS — Z87.898 PERSONAL HISTORY OF OTHER SPECIFIED CONDITIONS: ICD-10-CM

## 2025-01-07 DIAGNOSIS — Z95.828 PRESENCE OF OTHER VASCULAR IMPLANTS AND GRAFTS: ICD-10-CM

## 2025-01-07 DIAGNOSIS — K74.60 UNSPECIFIED CIRRHOSIS OF LIVER: ICD-10-CM

## 2025-01-07 DIAGNOSIS — G93.40 ENCEPHALOPATHY, UNSPECIFIED: ICD-10-CM

## 2025-01-07 PROCEDURE — 99214 OFFICE O/P EST MOD 30 MIN: CPT

## 2025-02-26 NOTE — PROGRESS NOTE ADULT - REASON FOR ADMISSION
Patient has anorexia in addition to her anxiety/PTSD etc  Was in a comprehensive program at [facility] but this was not covered by her insurance  I am hopeful the IOP will help but seeing a dietician should be part of the plan too    Diarrhea will not resolve until she starts eating food normally. It is due to her liquid diet, she has had negative GI workup otherwise   Ascites

## 2025-03-04 ENCOUNTER — APPOINTMENT (OUTPATIENT)
Dept: HEPATOLOGY | Facility: CLINIC | Age: 65
End: 2025-03-04
Payer: SELF-PAY

## 2025-03-04 VITALS
WEIGHT: 172 LBS | SYSTOLIC BLOOD PRESSURE: 129 MMHG | DIASTOLIC BLOOD PRESSURE: 77 MMHG | BODY MASS INDEX: 29.37 KG/M2 | HEIGHT: 64 IN | HEART RATE: 70 BPM

## 2025-03-04 DIAGNOSIS — Z87.898 PERSONAL HISTORY OF OTHER SPECIFIED CONDITIONS: ICD-10-CM

## 2025-03-04 DIAGNOSIS — G93.40 ENCEPHALOPATHY, UNSPECIFIED: ICD-10-CM

## 2025-03-04 DIAGNOSIS — Z86.19 PERSONAL HISTORY OF OTHER INFECTIOUS AND PARASITIC DISEASES: ICD-10-CM

## 2025-03-04 DIAGNOSIS — K74.60 UNSPECIFIED CIRRHOSIS OF LIVER: ICD-10-CM

## 2025-03-04 DIAGNOSIS — Z95.828 PRESENCE OF OTHER VASCULAR IMPLANTS AND GRAFTS: ICD-10-CM

## 2025-03-04 PROCEDURE — 99214 OFFICE O/P EST MOD 30 MIN: CPT

## 2025-03-30 NOTE — ED PROVIDER NOTE - CLINICAL SUMMARY MEDICAL DECISION MAKING FREE TEXT BOX
30-Mar-2025 19:35 63M PMH pior ETOH abuse, HTN, cirrhosis/esophageal varices/recurrent ascites (presumed 2/2 etoh), p/w abd distention. W/i last few weeks pt has 2 LVP by IR, last had ~11L drained on 2/21 at [facility]. Has worsening abd distention and SOB (symptoms similar to prior to paracentesis). Pt states he was evaluated by Dr. Vizcarra and told to come to ER today for admission for further evaluation of possible additional procedures.   Has labs/CT in [facility].  Vitals wnl, exam as above.   ddx: Likely recurrent ascites 2/2 known liver disease. Sent by GI for admission.   Pre-ops. D/w GI fellow, will admit medicine for further care. 63M PMH prior ETOH abuse, HTN, cirrhosis/esophageal varices/recurrent ascites (presumed 2/2 etoh), p/w abd distention. W/i last few weeks pt has 2 LVP by IR, last had ~11L drained on 2/21 at [facility]. Has worsening abd distention and SOB (symptoms similar to prior to paracentesis). Pt states he was evaluated by Dr. Vizcarra and told to come to ER today for admission for further evaluation of possible additional procedures.   Has labs/CT in [facility].  Vitals wnl, exam as above.   ddx: Likely recurrent ascites 2/2 known liver disease. Sent by GI for admission.   Pre-ops. D/w GI fellow, will admit medicine for further care.

## 2025-06-03 ENCOUNTER — APPOINTMENT (OUTPATIENT)
Dept: HEPATOLOGY | Facility: CLINIC | Age: 65
End: 2025-06-03
Payer: SELF-PAY

## 2025-06-03 VITALS
HEART RATE: 64 BPM | BODY MASS INDEX: 31.28 KG/M2 | HEIGHT: 62 IN | DIASTOLIC BLOOD PRESSURE: 69 MMHG | WEIGHT: 170 LBS | SYSTOLIC BLOOD PRESSURE: 128 MMHG

## 2025-06-03 DIAGNOSIS — Z87.898 PERSONAL HISTORY OF OTHER SPECIFIED CONDITIONS: ICD-10-CM

## 2025-06-03 DIAGNOSIS — G93.40 ENCEPHALOPATHY, UNSPECIFIED: ICD-10-CM

## 2025-06-03 DIAGNOSIS — Z95.828 PRESENCE OF OTHER VASCULAR IMPLANTS AND GRAFTS: ICD-10-CM

## 2025-06-03 DIAGNOSIS — K74.60 UNSPECIFIED CIRRHOSIS OF LIVER: ICD-10-CM

## 2025-06-03 DIAGNOSIS — Z86.19 PERSONAL HISTORY OF OTHER INFECTIOUS AND PARASITIC DISEASES: ICD-10-CM

## 2025-06-03 PROCEDURE — 99214 OFFICE O/P EST MOD 30 MIN: CPT

## 2025-08-05 ENCOUNTER — APPOINTMENT (OUTPATIENT)
Dept: HEPATOLOGY | Facility: CLINIC | Age: 65
End: 2025-08-05
Payer: MEDICAID

## 2025-08-05 VITALS
SYSTOLIC BLOOD PRESSURE: 131 MMHG | HEART RATE: 88 BPM | HEIGHT: 62 IN | DIASTOLIC BLOOD PRESSURE: 74 MMHG | BODY MASS INDEX: 29.81 KG/M2 | OXYGEN SATURATION: 97 % | WEIGHT: 162 LBS

## 2025-08-05 DIAGNOSIS — Z95.828 PRESENCE OF OTHER VASCULAR IMPLANTS AND GRAFTS: ICD-10-CM

## 2025-08-05 DIAGNOSIS — R17 UNSPECIFIED JAUNDICE: ICD-10-CM

## 2025-08-05 DIAGNOSIS — Z87.898 PERSONAL HISTORY OF OTHER SPECIFIED CONDITIONS: ICD-10-CM

## 2025-08-05 DIAGNOSIS — K74.60 UNSPECIFIED CIRRHOSIS OF LIVER: ICD-10-CM

## 2025-08-05 DIAGNOSIS — G93.40 ENCEPHALOPATHY, UNSPECIFIED: ICD-10-CM

## 2025-08-05 PROCEDURE — 99214 OFFICE O/P EST MOD 30 MIN: CPT

## 2025-08-06 ENCOUNTER — NON-APPOINTMENT (OUTPATIENT)
Age: 65
End: 2025-08-06

## 2025-08-06 LAB
CHOLEST SERPL-MCNC: 137 MG/DL
ESTIMATED AVERAGE GLUCOSE: 80 MG/DL
HBA1C MFR BLD HPLC: 4.4 %
HDLC SERPL-MCNC: 14 MG/DL
LDLC SERPL-MCNC: 95 MG/DL
NONHDLC SERPL-MCNC: 123 MG/DL
TRIGL SERPL-MCNC: 156 MG/DL

## 2025-08-07 ENCOUNTER — NON-APPOINTMENT (OUTPATIENT)
Age: 65
End: 2025-08-07